# Patient Record
Sex: MALE | Race: WHITE | Employment: OTHER | RURAL
[De-identification: names, ages, dates, MRNs, and addresses within clinical notes are randomized per-mention and may not be internally consistent; named-entity substitution may affect disease eponyms.]

---

## 2017-04-04 ENCOUNTER — LAB ONLY (OUTPATIENT)
Dept: FAMILY MEDICINE CLINIC | Age: 67
End: 2017-04-04

## 2017-04-04 DIAGNOSIS — E05.90 SUBCLINICAL HYPERTHYROIDISM: ICD-10-CM

## 2017-04-04 DIAGNOSIS — Z12.5 SCREENING PSA (PROSTATE SPECIFIC ANTIGEN): ICD-10-CM

## 2017-04-04 DIAGNOSIS — I10 ESSENTIAL HYPERTENSION: ICD-10-CM

## 2017-04-04 DIAGNOSIS — Z11.59 NEED FOR HEPATITIS C SCREENING TEST: Primary | ICD-10-CM

## 2017-04-04 DIAGNOSIS — E78.2 MIXED HYPERLIPIDEMIA: ICD-10-CM

## 2017-04-04 DIAGNOSIS — E11.9 TYPE 2 DIABETES MELLITUS WITHOUT COMPLICATION, WITHOUT LONG-TERM CURRENT USE OF INSULIN (HCC): ICD-10-CM

## 2017-04-04 NOTE — MR AVS SNAPSHOT
Visit Information Date & Time Provider Department Dept. Phone Encounter #  
 4/4/2017  7:05 AM YO Gates [id number] Your Appointments 4/12/2017  7:30 AM  
Follow Up with Isabella Fleischer, MD  
[address] ([address]) Appt Note: 6 mo f/u  
 1000 [facility] [address] [facility],5[address] [id number] [phone]  
  
   
 [address] [facility] [address] Upcoming Health Maintenance Date Due Hepatitis C Screening 1950 MICROALBUMIN Q1 7/20/1958 EYE EXAM RETINAL OR DILATED Q1 7/20/1958 FOBT Q 1 YEAR AGE 50-75 7/20/1998 ZOSTER VACCINE AGE 60> 7/20/2008 GLAUCOMA SCREENING Q2Y 7/20/2013 MEDICARE YEARLY EXAM 7/20/2013 HEMOGLOBIN A1C Q6M 4/19/2017 LIPID PANEL Q1 10/19/2017 FOOT EXAM Q1 10/25/2017 DTaP/Tdap/Td series (2 - Td) 4/4/2026 Allergies as of 4/4/2017  Review Complete On: 10/25/2016 By: Sophia Tinoco LPN Severity Noted Reaction Type Reactions Ace Inhibitors  12/24/2015    Cough Amlodipine  12/24/2015    Not Reported This Time Claritin [Loratadine]  11/24/2015    Hives Pcn [Penicillins]  12/24/2015    Not Reported This Time Current Immunizations  Reviewed on 11/24/2015 Name Date Influenza High Dose Vaccine PF 10/19/2016  7:54 AM  
 Influenza Vaccine 10/27/2015 Pneumococcal Conjugate (PCV-13) 11/24/2015  4:00 PM  
 Pneumococcal Polysaccharide (PPSV-23) 9/11/2013 Tdap 4/4/2016 Not reviewed this visit You Were Diagnosed With   
  
 Codes Comments Need for hepatitis C screening test    -  Primary ICD-10-CM: Z11.59 
ICD-9-CM: V73.89 Type 2 diabetes mellitus without complication, without long-term current use of insulin (HCC)     ICD-10-CM: E11.9 ICD-9-CM: 250.00 Essential hypertension     ICD-10-CM: I10 
ICD-9-CM: 401.9 Mixed hyperlipidemia     ICD-10-CM: E78.2 ICD-9-CM: 272.2 Subclinical hyperthyroidism     ICD-10-CM: E05.90 ICD-9-CM: 242.90 Screening PSA (prostate specific antigen)     ICD-10-CM: Z12.5 ICD-9-CM: V76.44 Vitals Smoking Status Former Smoker Preferred Pharmacy Pharmacy Name Phone [address] [phone] Your Updated Medication List  
  
   
This list is accurate as of: 4/4/17  9:12 AM.  Always use your most recent med list.  
  
  
  
  
 atenolol 50 mg tablet Commonly known as:  TENORMIN  
TAKE 1/2 TABLET BY MOUTH EVERY DAY  
  
 atorvastatin 40 mg tablet Commonly known as:  LIPITOR  
TAKE 1 TABLET DAILY FISH OIL 1,000 mg Cap Generic drug:  omega-3 fatty acids-vitamin e Take 1 Cap by mouth.  
  
 losartan 100 mg tablet Commonly known as:  COZAAR Take 1 Tab by mouth daily. VITAMIN D3 2,000 unit Tab Generic drug:  cholecalciferol (vitamin D3) Take  by mouth. We Performed the Following CBC WITH AUTOMATED DIFF [76360 CPT(R)] HEMOGLOBIN A1C WITH EAG [33574 CPT(R)] HEPATITIS C AB [09521 CPT(R)] LIPID PANEL [37788 CPT(R)] METABOLIC PANEL, COMPREHENSIVE [73844 CPT(R)] RI COLLECTION VENOUS BLOOD,VENIPUNCTURE [id number] CPT(R)] RI HANDLG&/OR CONVEY OF SPEC FOR TR OFFICE TO LAB [36342 CPT(R)] PROSTATE SPECIFIC AG (PSA) [id number] CPT(R)] TSH 3RD GENERATION [30322 CPT(R)] Introducing [facility] & HEALTH SERVICES! Paelvia Hou introduces [employer] patient portal. Now you can access parts of your medical record, email your doctor's office, and request medication refills online. 1. In your internet browser, go to [email or web address] 2. Click on the First Time User? Click Here link in the Sign In box. You will see the New Member Sign Up page. 3. Enter your [employer] Access Code exactly as it appears below.  You will not need to use this code after youve completed the sign-up process. If you do not sign up before the expiration date, you must request a new code. · Hands Access Code: [email or web address] 
Expires: 7/3/2017  9:12 AM 
 
4. Enter the last four digits of your Social Security Number (xxxx) and Date of Birth (mm/dd/yyyy) as indicated and click Submit. You will be taken to the next sign-up page. 5. Create a Hands ID. This will be your Hands login ID and cannot be changed, so think of one that is secure and easy to remember. 6. Create a Hands password. You can change your password at any time. 7. Enter your Password Reset Question and Answer. This can be used at a later time if you forget your password. 8. Enter your e-mail address. You will receive e-mail notification when new information is available in [address] 9. Click Sign Up. You can now view and download portions of your medical record. 10. Click the Download Summary menu link to download a portable copy of your medical information. If you have questions, please visit the Frequently Asked Questions section of the Hands website. Remember, Hands is NOT to be used for urgent needs. For medical emergencies, dial 911. Now available from your iPhone and Android! Please provide this summary of care documentation to your next provider. Your primary care clinician is listed as Ivan Duffy. If you have any questions after today's visit, please call [phone].

## 2017-04-05 LAB
ALBUMIN SERPL-MCNC: 4.6 G/DL (ref 3.6–4.8)
ALBUMIN/GLOB SERPL: 2.2 {RATIO} (ref 1.2–2.2)
ALP SERPL-CCNC: 46 IU/L (ref 39–117)
ALT SERPL-CCNC: 25 IU/L (ref 0–44)
AST SERPL-CCNC: 30 IU/L (ref 0–40)
BASOPHILS # BLD AUTO: 0 X10E3/UL (ref 0–0.2)
BASOPHILS NFR BLD AUTO: 0 %
BILIRUB SERPL-MCNC: 1.1 MG/DL (ref 0–1.2)
BUN SERPL-MCNC: 14 MG/DL (ref 8–27)
BUN/CREAT SERPL: 15 (ref 10–24)
CALCIUM SERPL-MCNC: 9.3 MG/DL (ref 8.6–10.2)
CHLORIDE SERPL-SCNC: 102 MMOL/L (ref 96–106)
CHOLEST SERPL-MCNC: 122 MG/DL (ref 100–199)
CO2 SERPL-SCNC: 22 MMOL/L (ref 18–29)
CREAT SERPL-MCNC: 0.94 MG/DL (ref 0.76–1.27)
EOSINOPHIL # BLD AUTO: 0.3 X10E3/UL (ref 0–0.4)
EOSINOPHIL NFR BLD AUTO: 4 %
ERYTHROCYTE [DISTWIDTH] IN BLOOD BY AUTOMATED COUNT: 14.8 % (ref 12.3–15.4)
EST. AVERAGE GLUCOSE BLD GHB EST-MCNC: 128 MG/DL
GLOBULIN SER CALC-MCNC: 2.1 G/DL (ref 1.5–4.5)
GLUCOSE SERPL-MCNC: 125 MG/DL (ref 65–99)
HBA1C MFR BLD: 6.1 % (ref 4.8–5.6)
HCT VFR BLD AUTO: 43.8 % (ref 37.5–51)
HCV AB S/CO SERPL IA: <0.1 S/CO RATIO (ref 0–0.9)
HDLC SERPL-MCNC: 60 MG/DL
HGB BLD-MCNC: 14.4 G/DL (ref 12.6–17.7)
IMM GRANULOCYTES # BLD: 0 X10E3/UL (ref 0–0.1)
IMM GRANULOCYTES NFR BLD: 0 %
LDLC SERPL CALC-MCNC: 49 MG/DL (ref 0–99)
LYMPHOCYTES # BLD AUTO: 2.3 X10E3/UL (ref 0.7–3.1)
LYMPHOCYTES NFR BLD AUTO: 38 %
MCH RBC QN AUTO: 28.9 PG (ref 26.6–33)
MCHC RBC AUTO-ENTMCNC: 32.9 G/DL (ref 31.5–35.7)
MCV RBC AUTO: 88 FL (ref 79–97)
MONOCYTES # BLD AUTO: 0.5 X10E3/UL (ref 0.1–0.9)
MONOCYTES NFR BLD AUTO: 9 %
NEUTROPHILS # BLD AUTO: 2.9 X10E3/UL (ref 1.4–7)
NEUTROPHILS NFR BLD AUTO: 49 %
PLATELET # BLD AUTO: 209 X10E3/UL (ref 150–379)
POTASSIUM SERPL-SCNC: 4.4 MMOL/L (ref 3.5–5.2)
PROT SERPL-MCNC: 6.7 G/DL (ref 6–8.5)
PSA SERPL-MCNC: 0.8 NG/ML (ref 0–4)
RBC # BLD AUTO: 4.98 X10E6/UL (ref 4.14–5.8)
SODIUM SERPL-SCNC: 143 MMOL/L (ref 134–144)
TRIGL SERPL-MCNC: 67 MG/DL (ref 0–149)
TSH SERPL DL<=0.005 MIU/L-ACNC: 0.16 UIU/ML (ref 0.45–4.5)
VLDLC SERPL CALC-MCNC: 13 MG/DL (ref 5–40)
WBC # BLD AUTO: 6 X10E3/UL (ref 3.4–10.8)

## 2017-04-12 ENCOUNTER — OFFICE VISIT (OUTPATIENT)
Dept: FAMILY MEDICINE CLINIC | Age: 67
End: 2017-04-12

## 2017-04-12 VITALS
RESPIRATION RATE: 16 BRPM | WEIGHT: 186 LBS | SYSTOLIC BLOOD PRESSURE: 126 MMHG | HEART RATE: 50 BPM | OXYGEN SATURATION: 98 % | BODY MASS INDEX: 26.69 KG/M2 | DIASTOLIC BLOOD PRESSURE: 62 MMHG

## 2017-04-12 DIAGNOSIS — E05.90 SUBCLINICAL HYPERTHYROIDISM: ICD-10-CM

## 2017-04-12 DIAGNOSIS — E11.9 TYPE 2 DIABETES MELLITUS WITHOUT COMPLICATION, WITHOUT LONG-TERM CURRENT USE OF INSULIN (HCC): ICD-10-CM

## 2017-04-12 DIAGNOSIS — I10 ESSENTIAL HYPERTENSION: ICD-10-CM

## 2017-04-12 DIAGNOSIS — Z00.00 MEDICARE ANNUAL WELLNESS VISIT, SUBSEQUENT: Primary | ICD-10-CM

## 2017-04-12 NOTE — ACP (ADVANCE CARE PLANNING)
Discussed Advance Directives, thinks he had one at [facility], unsure. Will give him [place] form.

## 2017-04-12 NOTE — ACP (ADVANCE CARE PLANNING)
Discussed Advance Directives, thinks he had one at [facility], unsure. Will give him [place] form.

## 2017-04-12 NOTE — ACP (ADVANCE CARE PLANNING)
In the event that he is unable to speak for himself, he asks that we contact his wife, Laura Garnica, be contacted at [phone]    Marvin Vaz

## 2017-04-12 NOTE — MR AVS SNAPSHOT
Visit Information Date & Time Provider Department Dept. Phone Encounter #  
 4/12/2017  7:30 AM Lizette Linares MD [facility] [id number] Follow-up Instructions Return in about 1 year (around 4/12/2018). Upcoming Health Maintenance Date Due MICROALBUMIN Q1 7/20/1958 EYE EXAM RETINAL OR DILATED Q1 7/20/1958 FOBT Q 1 YEAR AGE 50-75 7/20/1998 GLAUCOMA SCREENING Q2Y 7/20/2013 MEDICARE YEARLY EXAM 7/20/2013 HEMOGLOBIN A1C Q6M 10/4/2017 FOOT EXAM Q1 10/25/2017 LIPID PANEL Q1 4/4/2018 DTaP/Tdap/Td series (2 - Td) 4/4/2026 Allergies as of 4/12/2017  Review Complete On: 4/12/2017 By: Lizette Linares MD  
  
 Severity Noted Reaction Type Reactions Ace Inhibitors  12/24/2015    Cough Amlodipine  12/24/2015    Not Reported This Time Claritin [Loratadine]  11/24/2015    Hives Pcn [Penicillins]  12/24/2015    Not Reported This Time Current Immunizations  Reviewed on 11/24/2015 Name Date Influenza High Dose Vaccine PF 10/19/2016  7:54 AM  
 Influenza Vaccine 10/27/2015 Pneumococcal Conjugate (PCV-13) 11/24/2015  4:00 PM  
 Pneumococcal Polysaccharide (PPSV-23) 9/11/2013 Tdap 4/4/2016 Not reviewed this visit You Were Diagnosed With   
  
 Codes Comments Medicare annual wellness visit, subsequent    -  Primary ICD-10-CM: Z00.00 ICD-9-CM: V70.0 Type 2 diabetes mellitus without complication, without long-term current use of insulin (HCC)     ICD-10-CM: E11.9 ICD-9-CM: 250.00 Subclinical hyperthyroidism     ICD-10-CM: E05.90 ICD-9-CM: 242.90 Essential hypertension     ICD-10-CM: I10 
ICD-9-CM: 401.9 Vitals BP Pulse Resp Weight(growth percentile) SpO2 BMI  
 126/62 (BP 1 Location: Left arm, BP Patient Position: Sitting) (!) 50 16 186 lb (84.4 kg) 98% 26.69 kg/m2 Smoking Status Former Smoker BMI and BSA Data Body Mass Index Body Surface Area  
 26.69 kg/m 2 2.04 m 2 Preferred Pharmacy Pharmacy Name Phone [address] [facility] [phone] Your Updated Medication List  
  
   
This list is accurate as of: 4/12/17  8:35 AM.  Always use your most recent med list.  
  
  
  
  
 atenolol 50 mg tablet Commonly known as:  TENORMIN  
TAKE 1/2 TABLET BY MOUTH EVERY DAY  
  
 atorvastatin 40 mg tablet Commonly known as:  LIPITOR  
TAKE 1 TABLET DAILY FISH OIL 1,000 mg Cap Generic drug:  omega-3 fatty acids-vitamin e Take 1 Cap by mouth two (2) times a day. losartan 100 mg tablet Commonly known as:  COZAAR Take 1 Tab by mouth daily. VITAMIN D3 2,000 unit Tab Generic drug:  cholecalciferol (vitamin D3) Take  by mouth. Follow-up Instructions Return in about 1 year (around 4/12/2018). Introducing \[facility]\"" & HEALTH SERVICES! Elidia Moore introduces [employer] patient portal. Now you can access parts of your medical record, email your doctor's office, and request medication refills online. 1. In your internet browser, go to [email or web address] 2. Click on the First Time User? Click Here link in the Sign In box. You will see the New Member Sign Up page. 3. Enter your [employer] Access Code exactly as it appears below. You will not need to use this code after youve completed the sign-up process. If you do not sign up before the expiration date, you must request a new code. · [employer] Access Code: [email or web address] 
Expires: 7/3/2017  9:12 AM 
 
4. Enter the last four digits of your Social Security Number (xxxx) and Date of Birth (mm/dd/yyyy) as indicated and click Submit. You will be taken to the next sign-up page. 5. Create a [employer] ID. This will be your [employer] login ID and cannot be changed, so think of one that is secure and easy to remember. 6. Create a Yob[employer] password. You can change your password at any time. 7. Enter your Password Reset Question and Answer. This can be used at a later time if you forget your password. 8. Enter your e-mail address. You will receive e-mail notification when new information is available in [address] 9. Click Sign Up. You can now view and download portions of your medical record. 10. Click the Download Summary menu link to download a portable copy of your medical information. If you have questions, please visit the Frequently Asked Questions section of the [employer] website. Remember, [employer] is NOT to be used for urgent needs. For medical emergencies, dial 911. Now available from your iPhone and Android! Please provide this summary of care documentation to your next provider. Your primary care clinician is listed as Victor Hugo Weathers. If you have any questions after today's visit, please call [phone].

## 2017-04-12 NOTE — PROGRESS NOTES
Chief Complaint   Patient presents with    Annual Wellness Visit         HPI:      Shade Soto is a 74 y.o. male retired [employer] employee who has a history of subclinical hyperthyroidism spanning several decades. He is taking Atenolol and no thyroid medications. No sx. Generally checks the TSH Q 6 months       He is hypertensive and is treated with Losartan and Atenolol. Denies side effects. Following an elevated A1c, he adopted a healthy diet and exercise program and has lost 15 pounds. His labs were reviewed with him today in the office (A1c 6.1). New Issues:  He is due for SAWV    Allergies   Allergen Reactions    Ace Inhibitors Cough    Amlodipine Not Reported This Time    Claritin [Loratadine] Hives    Pcn [Penicillins] Not Reported This Time       Current Outpatient Prescriptions   Medication Sig    atenolol (TENORMIN) 50 mg tablet TAKE 1/2 TABLET BY MOUTH EVERY DAY    losartan (COZAAR) 100 mg tablet Take 1 Tab by mouth daily.  cholecalciferol, vitamin D3, (VITAMIN D3) 2,000 unit tab Take  by mouth.  omega-3 fatty acids-vitamin e (FISH OIL) 1,000 mg cap Take 1 Cap by mouth.  atorvastatin (LIPITOR) 40 mg tablet TAKE 1 TABLET DAILY     No current facility-administered medications for this visit. Past Medical History:   Diagnosis Date    Conductive hearing loss, middle ear     [facility] 10/19/2016    A1c = 6.6    Elevated liver enzymes     HTN (hypertension)     Hyperlipidemia     Subclinical hyperthyroidism     Tinnitus          ROS:  Denies fever, chills, cough, chest pain, SOB,  nausea, vomiting, or diarrhea. Denies wt loss, wt gain, hemoptysis, hematochezia or melena.     Physical Examination:    /62 (BP 1 Location: Left arm, BP Patient Position: Sitting)  Pulse (!) 50  Resp 16  Wt 186 lb (84.4 kg)  SpO2 98%  BMI 26.69 kg/m2    General: Alert and Ox3, Fluent speech  HEENT:  NC/AT, EOMI, OP: clear  Neck:  Supple, no adenopathy, JVD, mass or bruit  Chest:  Clear to Ausculation, without wheezes, rales, rubs or ronchi  Cardiac: RRR  Abdomen:  +BS, soft, nontender without palpable HSM  Extremities:  No cyanosis, clubbing or edema  Neurologic:  Ambulatory without assist, CN 2-12 grossly intact. Moves all extremities. Skin: no rash  Lymphadenopathy: no cervical or supraclavicular nodes      ASSESSMENT AND PLAN:     1. Well controlled HTN:  He is at goal.  May need to back down the Losartan if he becomes hypotensive this summer  2. T2DM:  Basically he has put this into remission  3. ACP update  4. SAWV today  5. Subclinical Hyperthyroidism:  Continue to monitor every 6 months. No orders of the defined types were placed in this encounter. Tayler Pate MD, FACP        ______________________________________________________________________    Curt Clements is a 74 y.o. male and presents for annual Medicare Wellness Visit. Problem List: Reviewed with patient and discussed risk factors. Patient Active Problem List   Diagnosis Code    HTN (hypertension) I10    Hyperlipidemia E78.5    Elevated liver enzymes R74.8    Low TSH level R94.6    Subclinical hyperthyroidism E05.90    DM2 (diabetes mellitus, type 2) (HCC) E11.9       Current medical providers:  Patient Care Team:  Chani Stewart MD as PCP - General (Internal Medicine)    PM, , Medications/Allergies: reviewed, on chart. Female Alcohol Screening: On any occasion during the past 3 months, have you had more than 3 drinks containing alcohol? No    Do you average more than 7 drinks per week? No    ROS:  Constitutional: No fever, chills or weight loss  Respiratory: No cough, SOB   CV: No chest pain or Palpitations    Objective:  Visit Vitals    /62 (BP 1 Location: Left arm, BP Patient Position: Sitting)    Pulse (!) 50    Resp 16    Wt 186 lb (84.4 kg)    SpO2 98%    BMI 26.69 kg/m2    Body mass index is 26.69 kg/(m^2).     Assessment of cognitive impairment: Alert and oriented x 3    Depression Screen:   PHQ 2 / 9, over the last two weeks 4/12/2017   Little interest or pleasure in doing things Not at all   Feeling down, depressed or hopeless Not at all   Total Score PHQ 2 0       Fall Risk Assessment:    Fall Risk Assessment, last 12 mths 4/12/2017   Able to walk? Yes   Fall in past 12 months? No       Functional Ability:   Does the patient exhibit a steady gait? yes   How long did it take the patient to get up and walk from a sitting position? 12 sec   Is the patient self reliant?  (ie can do own laundry, meals, household chores)  yes     Does the patient handle his/her own medications? yes     Does the patient handle his/her own money? yes     Is the patients home safe (ie good lighting, handrails on stairs and bath, etc.)? yes     Did you notice or did patient express any hearing difficulties? yes     Did you notice or did patient express any vision difficulties? no       Advance Care Planning:   Patient was offered the opportunity to discuss advance care planning:  yes     Does patient have an Advance Directive:  yes   If no, did you provide information on Caring Connections?  no       Plan:      No orders of the defined types were placed in this encounter. Health Maintenance   Topic Date Due    MICROALBUMIN Q1  07/20/1960    EYE EXAM RETINAL OR DILATED Q1  07/20/1958    FOBT Q 1 YEAR AGE 50-75  07/20/1998    GLAUCOMA SCREENING Q2Y  07/20/2013    MEDICARE YEARLY EXAM  07/20/2013    HEMOGLOBIN A1C Q6M  10/04/2017    FOOT EXAM Q1  10/25/2017    LIPID PANEL Q1  04/04/2018    DTaP/Tdap/Td series (2 - Td) 04/04/2026    Hepatitis C Screening  Completed    ZOSTER VACCINE AGE 60>  Addressed    Pneumococcal 65+ Low/Medium Risk  Completed    INFLUENZA AGE 9 TO ADULT  Completed       *Patient verbalized understanding and agreement with the plan.   A copy of the After Visit Summary with personalized health plan was given to the patient today.

## 2017-06-16 ENCOUNTER — TELEPHONE (OUTPATIENT)
Dept: FAMILY MEDICINE CLINIC | Age: 67
End: 2017-06-16

## 2017-06-16 RX ORDER — LOSARTAN POTASSIUM 100 MG/1
100 TABLET ORAL DAILY
Qty: 90 TAB | Refills: 3 | Status: SHIPPED | OUTPATIENT
Start: 2017-06-16 | End: 2018-06-28 | Stop reason: DRUGHIGH

## 2017-06-16 RX ORDER — ATORVASTATIN CALCIUM 40 MG/1
40 TABLET, FILM COATED ORAL DAILY
Qty: 90 TAB | Refills: 3 | Status: SHIPPED | OUTPATIENT
Start: 2017-06-16 | End: 2018-06-28 | Stop reason: SDUPTHER

## 2017-10-24 ENCOUNTER — LAB ONLY (OUTPATIENT)
Dept: FAMILY MEDICINE CLINIC | Age: 67
End: 2017-10-24

## 2017-10-24 DIAGNOSIS — E05.90 SUBCLINICAL HYPERTHYROIDISM: ICD-10-CM

## 2017-10-24 DIAGNOSIS — Z23 ENCOUNTER FOR IMMUNIZATION: ICD-10-CM

## 2017-10-24 DIAGNOSIS — I10 ESSENTIAL HYPERTENSION: ICD-10-CM

## 2017-10-24 DIAGNOSIS — E11.9 TYPE 2 DIABETES MELLITUS WITHOUT COMPLICATION, WITHOUT LONG-TERM CURRENT USE OF INSULIN (HCC): ICD-10-CM

## 2017-10-24 DIAGNOSIS — E11.9 TYPE 2 DIABETES MELLITUS WITHOUT COMPLICATION, WITHOUT LONG-TERM CURRENT USE OF INSULIN (HCC): Primary | ICD-10-CM

## 2017-10-24 NOTE — PROGRESS NOTES
Usama Hunter is a 69 y.o. male who presents for routine immunizations. He denies any symptoms , reactions or allergies that would exclude them from being immunized today. Risks and adverse reactions were discussed and the VIS was given to them. All questions were addressed.       Shane Hramon LPN

## 2017-10-24 NOTE — MR AVS SNAPSHOT
Visit Information Date & Time Provider Department Dept. Phone Encounter #  
 10/24/2017  7:05 AM YO Edmondson0 [id number] Upcoming Health Maintenance Date Due MICROALBUMIN Q1 7/20/1960 EYE EXAM RETINAL OR DILATED Q1 7/20/1960 FOBT Q 1 YEAR AGE 50-75 7/20/1998 GLAUCOMA SCREENING Q2Y 7/20/2013 INFLUENZA AGE 9 TO ADULT 8/1/2017 HEMOGLOBIN A1C Q6M 10/4/2017 FOOT EXAM Q1 10/25/2017 LIPID PANEL Q1 4/4/2018 MEDICARE YEARLY EXAM 4/13/2018 DTaP/Tdap/Td series (2 - Td) 4/4/2026 Allergies as of 10/24/2017  Review Complete On: 4/12/2017 By: Ashlee Bran MD  
  
 Severity Noted Reaction Type Reactions Ace Inhibitors  12/24/2015    Cough Amlodipine  12/24/2015    Not Reported This Time Claritin [Loratadine]  11/24/2015    Hives Pcn [Penicillins]  12/24/2015    Not Reported This Time Current Immunizations  Reviewed on 11/24/2015 Name Date Influenza High Dose Vaccine PF 10/24/2017  7:26 AM, 10/19/2016  7:54 AM  
 Influenza Vaccine 10/27/2015 Pneumococcal Conjugate (PCV-13) 11/24/2015  4:00 PM  
 Pneumococcal Polysaccharide (PPSV-23) 9/11/2013 Tdap 4/4/2016 Not reviewed this visit You Were Diagnosed With   
  
 Codes Comments Encounter for immunization     ICD-10-CM: B43 ICD-9-CM: V03.89 Vitals Smoking Status Former Smoker Preferred Pharmacy Pharmacy Name Phone [id number] [address] [phone] Your Updated Medication List  
  
   
This list is accurate as of: 10/24/17  7:30 AM.  Always use your most recent med list.  
  
  
  
  
 atenolol 50 mg tablet Commonly known as:  TENORMIN  
TAKE 1/2 TABLET BY MOUTH EVERY DAY  
  
 atorvastatin 40 mg tablet Commonly known as:  LIPITOR Take 1 Tab by mouth daily. FISH OIL 1,000 mg Cap Generic drug:  omega-3 fatty acids-vitamin e Take 1 Cap by mouth two (2) times a day. losartan 100 mg tablet Commonly known as:  COZAAR Take 1 Tab by mouth daily. VITAMIN D3 2,000 unit Tab Generic drug:  cholecalciferol (vitamin D3) Take  by mouth. We Performed the Following INFLUENZA VIRUS VACCINE, HIGH DOSE SEASONAL, PRESERVATIVE FREE [06049 CPT(R)] Patient Instructions Vaccine Information Statement Influenza (Flu) Vaccine (Inactivated or Recombinant): What you need to know Many Vaccine Information Statements are available in Turkish and other languages. See www.immunize.org/vis Hojas de Información Sobre Vacunas están disponibles en Español y en muchos otros idiomas. Visite www.immunize.org/vis 1. Why get vaccinated? Influenza (flu) is a contagious disease that spreads around the [facility] every year, usually between October and May. Flu is caused by influenza viruses, and is spread mainly by coughing, sneezing, and close contact. Anyone can get flu. Flu strikes suddenly and can last several days. Symptoms vary by age, but can include: 
 fever/chills  sore throat  muscle aches  fatigue  cough  headache  runny or stuffy nose Flu can also lead to pneumonia and blood infections, and cause diarrhea and seizures in children. If you have a medical condition, such as heart or lung disease, flu can make it worse. Flu is more dangerous for some people. Infants and young children, people 72years of age and older, pregnant women, and people with certain health conditions or a weakened immune system are at greatest risk. Each year thousands of people in the [facility] die from flu, and many more are hospitalized. Flu vaccine can: 
 keep you from getting flu, 
 make flu less severe if you do get it, and 
 keep you from spreading flu to your family and other people. 2. Inactivated and recombinant flu vaccines A dose of flu vaccine is recommended every flu season. Children 6 months through 6years of age may need two doses during the same flu season. Everyone else needs only one dose each flu season. Some inactivated flu vaccines contain a very small amount of a mercury-based preservative called thimerosal. Studies have not shown thimerosal in vaccines to be harmful, but flu vaccines that do not contain thimerosal are available. There is no live flu virus in flu shots. They cannot cause the flu. There are many flu viruses, and they are always changing. Each year a new flu vaccine is made to protect against three or four viruses that are likely to cause disease in the upcoming flu season. But even when the vaccine doesnt exactly match these viruses, it may still provide some protection Flu vaccine cannot prevent: 
 flu that is caused by a virus not covered by the vaccine, or 
 illnesses that look like flu but are not. It takes about 2 weeks for protection to develop after vaccination, and protection lasts through the flu season. 3. Some people should not get this vaccine Tell the person who is giving you the vaccine:  If you have any severe, life-threatening allergies. If you ever had a life-threatening allergic reaction after a dose of flu vaccine, or have a severe allergy to any part of this vaccine, you may be advised not to get vaccinated. Most, but not all, types of flu vaccine contain a small amount of egg protein.  If you ever had Guillain-Barré Syndrome (also called GBS). Some people with a history of GBS should not get this vaccine. This should be discussed with your doctor.  If you are not feeling well. It is usually okay to get flu vaccine when you have a mild illness, but you might be asked to come back when you feel better. 4. Risks of a vaccine reaction With any medicine, including vaccines, there is a chance of reactions. These are usually mild and go away on their own, but serious reactions are also possible. Most people who get a flu shot do not have any problems with it. Minor problems following a flu shot include:  
 soreness, redness, or swelling where the shot was given  hoarseness  sore, red or itchy eyes  cough  fever  aches  headache  itching  fatigue If these problems occur, they usually begin soon after the shot and last 1 or 2 days. More serious problems following a flu shot can include the following:  There may be a small increased risk of Guillain-Barré Syndrome (GBS) after inactivated flu vaccine. This risk has been estimated at 1 or 2 additional cases per million people vaccinated. This is much lower than the risk of severe complications from flu, which can be prevented by flu vaccine.  Young children who get the flu shot along with pneumococcal vaccine (PCV13) and/or DTaP vaccine at the same time might be slightly more likely to have a seizure caused by fever. Ask your doctor for more information. Tell your doctor if a child who is getting flu vaccine has ever had a seizure. Problems that could happen after any injected vaccine:  People sometimes faint after a medical procedure, including vaccination. Sitting or lying down for about 15 minutes can help prevent fainting, and injuries caused by a fall. Tell your doctor if you feel dizzy, or have vision changes or ringing in the ears.  Some people get severe pain in the shoulder and have difficulty moving the arm where a shot was given. This happens very rarely.  Any medication can cause a severe allergic reaction. Such reactions from a vaccine are very rare, estimated at about 1 in a million doses, and would happen within a few minutes to a few hours after the vaccination. As with any medicine, there is a very remote chance of a vaccine causing a serious injury or death. The safety of vaccines is always being monitored. For more information, visit: www.cdc.gov/vaccinesafety/ 
 
 
The [facility] Vaccine Injury Compensation Program (VICP) is a federal program that was created to compensate people who may have been injured by certain vaccines. Persons who believe they may have been injured by a vaccine can learn about the program and about filing a claim by calling 8-671.570.3537 or visiting the [facility] website at www.[facility].gov/vaccinecompensation. There is a time limit to file a claim for compensation. 7. How can I learn more?  Ask your healthcare provider. He or she can give you the vaccine package insert or suggest other sources of information.  Call your local or state health department.  Contact the Centers for Disease Control and Prevention (CDC): 
- Call 1-522.457.7828 (1-800-CDC-INFO) or 
- Visit CDCs website at www.cdc.gov/flu Vaccine Information Statement Inactivated Influenza Vaccine 8/7/2015 
42 JOEL Caballero [id number] Department of Health and [employer] Centers for Disease Control and Prevention Office Use Only Introducing [facility]! Magdalena Katichar introduces Trly Uniq patient portal. Now you can access parts of your medical record, email your doctor's office, and request medication refills online. 1. In your internet browser, go to https://Rx Systems PF. GroupMe/Rx Systems PF 2. Click on the First Time User? Click Here link in the Sign In box. You will see the New Member Sign Up page. 3. Enter your Trly Uniq Access Code exactly as it appears below. You will not need to use this code after youve completed the sign-up process. If you do not sign up before the expiration date, you must request a new code. · Trly Uniq Access Code: [email or web address] Expires: 1/22/2018  7:13 AM 
 
4. Enter the last four digits of your Social Security Number (xxxx) and Date of Birth (mm/dd/yyyy) as indicated and click Submit. You will be taken to the next sign-up page. 5. Create a Trly Uniq ID. This will be your Trly Uniq login ID and cannot be changed, so think of one that is secure and easy to remember. 6. Create a Trly Uniq password. You can change your password at any time. 7. Enter your Password Reset Question and Answer. This can be used at a later time if you forget your password. 8. Enter your e-mail address. You will receive e-mail notification when new information is available in [address] 9. Click Sign Up. You can now view and download portions of your medical record. 10. Click the Download Summary menu link to download a portable copy of your medical information. If you have questions, please visit the Frequently Asked Questions section of the Trly Uniq website. Remember, Trly Uniq is NOT to be used for urgent needs. For medical emergencies, dial 911. Now available from your iPhone and Android! Please provide this summary of care documentation to your next provider. Your primary care clinician is listed as Cali Payton. If you have any questions after today's visit, please call [phone].

## 2017-10-25 LAB
ALBUMIN SERPL-MCNC: 4.6 G/DL (ref 3.6–4.8)
ALBUMIN/GLOB SERPL: 2.1 {RATIO} (ref 1.2–2.2)
ALP SERPL-CCNC: 50 IU/L (ref 39–117)
ALT SERPL-CCNC: 26 IU/L (ref 0–44)
AST SERPL-CCNC: 31 IU/L (ref 0–40)
BASOPHILS # BLD AUTO: 0 X10E3/UL (ref 0–0.2)
BASOPHILS NFR BLD AUTO: 0 %
BILIRUB SERPL-MCNC: 0.8 MG/DL (ref 0–1.2)
BUN SERPL-MCNC: 20 MG/DL (ref 8–27)
BUN/CREAT SERPL: 22 (ref 10–24)
CALCIUM SERPL-MCNC: 9.1 MG/DL (ref 8.6–10.2)
CHLORIDE SERPL-SCNC: 101 MMOL/L (ref 96–106)
CHOLEST SERPL-MCNC: 126 MG/DL (ref 100–199)
CO2 SERPL-SCNC: 27 MMOL/L (ref 18–29)
CREAT SERPL-MCNC: 0.9 MG/DL (ref 0.76–1.27)
EOSINOPHIL # BLD AUTO: 0.2 X10E3/UL (ref 0–0.4)
EOSINOPHIL NFR BLD AUTO: 3 %
ERYTHROCYTE [DISTWIDTH] IN BLOOD BY AUTOMATED COUNT: 13.8 % (ref 12.3–15.4)
EST. AVERAGE GLUCOSE BLD GHB EST-MCNC: 128 MG/DL
GFR SERPLBLD CREATININE-BSD FMLA CKD-EPI: 100 ML/MIN/1.73
GFR SERPLBLD CREATININE-BSD FMLA CKD-EPI: 87 ML/MIN/1.73
GLOBULIN SER CALC-MCNC: 2.2 G/DL (ref 1.5–4.5)
GLUCOSE SERPL-MCNC: 120 MG/DL (ref 65–99)
HBA1C MFR BLD: 6.1 % (ref 4.8–5.6)
HCT VFR BLD AUTO: 42.4 % (ref 37.5–51)
HDLC SERPL-MCNC: 56 MG/DL
HGB BLD-MCNC: 14.3 G/DL (ref 12.6–17.7)
IMM GRANULOCYTES # BLD: 0 X10E3/UL (ref 0–0.1)
IMM GRANULOCYTES NFR BLD: 0 %
LDLC SERPL CALC-MCNC: 58 MG/DL (ref 0–99)
LYMPHOCYTES # BLD AUTO: 2.3 X10E3/UL (ref 0.7–3.1)
LYMPHOCYTES NFR BLD AUTO: 40 %
MCH RBC QN AUTO: 28.5 PG (ref 26.6–33)
MCHC RBC AUTO-ENTMCNC: 33.7 G/DL (ref 31.5–35.7)
MCV RBC AUTO: 85 FL (ref 79–97)
MONOCYTES # BLD AUTO: 0.5 X10E3/UL (ref 0.1–0.9)
MONOCYTES NFR BLD AUTO: 8 %
NEUTROPHILS # BLD AUTO: 2.8 X10E3/UL (ref 1.4–7)
NEUTROPHILS NFR BLD AUTO: 49 %
PLATELET # BLD AUTO: 183 X10E3/UL (ref 150–379)
POTASSIUM SERPL-SCNC: 4.5 MMOL/L (ref 3.5–5.2)
PROT SERPL-MCNC: 6.8 G/DL (ref 6–8.5)
RBC # BLD AUTO: 5.01 X10E6/UL (ref 4.14–5.8)
SODIUM SERPL-SCNC: 143 MMOL/L (ref 134–144)
TRIGL SERPL-MCNC: 60 MG/DL (ref 0–149)
TSH SERPL DL<=0.005 MIU/L-ACNC: 0.3 UIU/ML (ref 0.45–4.5)
VLDLC SERPL CALC-MCNC: 12 MG/DL (ref 5–40)
WBC # BLD AUTO: 5.9 X10E3/UL (ref 3.4–10.8)

## 2018-06-21 ENCOUNTER — TELEPHONE (OUTPATIENT)
Dept: FAMILY MEDICINE CLINIC | Age: 68
End: 2018-06-21

## 2018-06-22 DIAGNOSIS — E11.9 TYPE 2 DIABETES MELLITUS WITHOUT COMPLICATION, WITHOUT LONG-TERM CURRENT USE OF INSULIN (HCC): Primary | ICD-10-CM

## 2018-06-22 DIAGNOSIS — E05.90 SUBCLINICAL HYPERTHYROIDISM: ICD-10-CM

## 2018-06-22 DIAGNOSIS — I10 ESSENTIAL HYPERTENSION: ICD-10-CM

## 2018-06-25 ENCOUNTER — LAB ONLY (OUTPATIENT)
Dept: FAMILY MEDICINE CLINIC | Age: 68
End: 2018-06-25

## 2018-06-25 DIAGNOSIS — E05.90 SUBCLINICAL HYPERTHYROIDISM: ICD-10-CM

## 2018-06-25 DIAGNOSIS — I10 ESSENTIAL HYPERTENSION: ICD-10-CM

## 2018-06-25 DIAGNOSIS — E11.9 TYPE 2 DIABETES MELLITUS WITHOUT COMPLICATION, WITHOUT LONG-TERM CURRENT USE OF INSULIN (HCC): ICD-10-CM

## 2018-06-25 LAB
ALBUMIN UR QL STRIP: 10 MG/L
CREATININE, URINE POC: 200 MG/DL
MICROALBUMIN/CREAT RATIO POC: <30 MG/G

## 2018-06-25 NOTE — MR AVS SNAPSHOT
[facility] 
 
 
 [address] [address] [phone] Patient: Claudeen Mccoy MRN: [id number]:[birth year]78[id number] Visit Information Date & Time Provider Department Dept. Phone Encounter #  
 6/25/2018  7:00 AM Kasey Riojas [id number] Your Appointments 6/28/2018  7:30 AM  
ESTABLISHED PATIENT with Álvaro Fernandez MD  
[place] [address] ([facility]) Appt Note: annual physical  
 [address] [address] [phone]  
  
   
 [address] [address] Upcoming Health Maintenance Date Due MICROALBUMIN Q1 7[birth year]20/1960 EYE EXAM RETINAL OR DILATED Q1 7/20/1958 FOBT Q 1 YEAR AGE 50-75 7/20/1998 GLAUCOMA SCREENING Q2Y 7/20/2013 FOOT EXAM Q1 10/25/2017 MEDICARE YEARLY EXAM 4/13/2018 HEMOGLOBIN A1C Q6M 4/24/2018 Influenza Age 5 to Adult 8/1/2018 LIPID PANEL Q1 10/24/2018 DTaP/Tdap/Td series (2 - Td) 4/4/2026 Allergies as of 6/25/2018  Review Complete On: 4/12/2017 By: Álvaro Fernandez MD  
  
 Severity Noted Reaction Type Reactions Ace Inhibitors  12/24/2015    Cough Amlodipine  12/24/2015    Not Reported This Time Claritin [Loratadine]  11/24/2015    Hives Pcn [Penicillins]  12/24/2015    Not Reported This Time Current Immunizations  Reviewed on 11/24/2015 Name Date Influenza High Dose Vaccine PF 10/24/2017  7:26 AM, 10/19/2016  7:54 AM  
 Influenza Vaccine 10/27/2015 Pneumococcal Conjugate (PCV-13) 11/24/2015  4:00 PM  
 Pneumococcal Polysaccharide (PPSV-23) 9/11/2013 Tdap 4/4/2016 Not reviewed this visit You Were Diagnosed With   
  
 Codes Comments Type 2 diabetes mellitus without complication, without long-term current use of insulin (HCC)     ICD-10-CM: E11.9 ICD-9-CM: 250.00 Essential hypertension     ICD-10-CM: I10 
ICD-9-CM: 401.9 Subclinical hyperthyroidism     ICD-10-CM: E05.90 ICD-9-CM: 242.90 Vitals Smoking Status Former Smoker Preferred Pharmacy [facility] Name Phone [id number] [address] [phone] Your Updated Medication List  
  
   
This list is accurate as of 6/25/18  7:32 AM.  Always use your most recent med list.  
  
  
  
  
 atenolol 50 mg tablet Commonly known as:  TENORMIN  
TAKE 1/2 TABLET BY MOUTH EVERY DAY  
  
 atorvastatin 40 mg tablet Commonly known as:  LIPITOR Take 1 Tab by mouth daily. FISH OIL 1,000 mg Cap Generic drug:  omega-3 fatty acids-vitamin e Take 1 Cap by mouth two (2) times a day. losartan 100 mg tablet Commonly known as:  COZAAR Take 1 Tab by mouth daily. VITAMIN D3 2,000 unit Tab Generic drug:  cholecalciferol (vitamin D3) Take  by mouth. We Performed the Following AMB POC URINE, MICROALBUMIN, SEMIQUANT (3 RESULTS) [67653 CPT(R)] CBC WITH AUTOMATED DIFF [19374 CPT(R)] HEMOGLOBIN A1C WITH EAG [06409 CPT(R)] LIPID PANEL [06729 CPT(R)] METABOLIC PANEL, COMPREHENSIVE [06169 CPT(R)] T4 (THYROXINE) [id number] CPT(R)] T4, FREE [id number] CPT(R)] TSH 3RD GENERATION [13262 CPT(R)] Patient Instructions If you have any questions regarding [employer], you may call [employer] support at [phone]. If you have any questions regarding [employer], you may call [employer] support at [phone]. Patient Instructions History Introducing [facility]! [employer] introduces ePatientFinder patient portal. Now you can access parts of your medical record, email your doctor's office, and request medication refills online. 1. In your internet browser, go to https://[employer]. [employer] 2. Click on the First Time User? Click Here link in the Sign In box. You will see the New Member Sign Up page. 3. Enter your Nantero Access Code exactly as it appears below. You will not need to use this code after youve completed the sign-up process. If you do not sign up before the expiration date, you must request a new code. · [employer]tero Access Code: [email or web address] Expires: 9/23/2018  7:28 AM 
 
4. Enter the last four digits of your Social Security Number (xxxx) and Date of Birth (mm/dd/yyyy) as indicated and click Submit. You will be taken to the next sign-up page. 5. Create a Nantero ID. This will be your Nantero login ID and cannot be changed, so think of one that is secure and easy to remember. 6. Create a Nantero password. You can change your password at any time. 7. Enter your Password Reset Question and Answer. This can be used at a later time if you forget your password. 8. Enter your e-mail address. You will receive e-mail notification when new information is available in [address] 9. Click Sign Up. You can now view and download portions of your medical record. 10. Click the Download Summary menu link to download a portable copy of your medical information. If you have questions, please visit the Frequently Asked Questions section of the Nantero website. Remember, Nantero is NOT to be used for urgent needs. For medical emergencies, dial 911. Now available from your iPhone and Android! Please provide this summary of care documentation to your next provider. Your primary care clinician is listed as John Osorio. If you have any questions after today's visit, please call [phone].

## 2018-06-25 NOTE — PATIENT INSTRUCTIONS
If you have any questions regarding [employer], you may call [employer] support at [phone]. If you have any questions regarding [employer], you may call [employer] support at [phone].

## 2018-06-26 LAB
ALBUMIN SERPL-MCNC: 4.4 G/DL (ref 3.6–4.8)
ALBUMIN/GLOB SERPL: 1.9 {RATIO} (ref 1.2–2.2)
ALP SERPL-CCNC: 46 IU/L (ref 39–117)
ALT SERPL-CCNC: 29 IU/L (ref 0–44)
AST SERPL-CCNC: 30 IU/L (ref 0–40)
BASOPHILS # BLD AUTO: 0 X10E3/UL (ref 0–0.2)
BASOPHILS NFR BLD AUTO: 0 %
BILIRUB SERPL-MCNC: 1.1 MG/DL (ref 0–1.2)
BUN SERPL-MCNC: 20 MG/DL (ref 8–27)
BUN/CREAT SERPL: 20 (ref 10–24)
CALCIUM SERPL-MCNC: 9.1 MG/DL (ref 8.6–10.2)
CHLORIDE SERPL-SCNC: 105 MMOL/L (ref 96–106)
CHOLEST SERPL-MCNC: 113 MG/DL (ref 100–199)
CO2 SERPL-SCNC: 23 MMOL/L (ref 20–29)
CREAT SERPL-MCNC: 1.01 MG/DL (ref 0.76–1.27)
EOSINOPHIL # BLD AUTO: 0.2 X10E3/UL (ref 0–0.4)
EOSINOPHIL NFR BLD AUTO: 4 %
ERYTHROCYTE [DISTWIDTH] IN BLOOD BY AUTOMATED COUNT: 14.1 % (ref 12.3–15.4)
EST. AVERAGE GLUCOSE BLD GHB EST-MCNC: 123 MG/DL
GFR SERPLBLD CREATININE-BSD FMLA CKD-EPI: 76 ML/MIN/1.73
GFR SERPLBLD CREATININE-BSD FMLA CKD-EPI: 87 ML/MIN/1.73
GLOBULIN SER CALC-MCNC: 2.3 G/DL (ref 1.5–4.5)
GLUCOSE SERPL-MCNC: 111 MG/DL (ref 65–99)
HBA1C MFR BLD: 5.9 % (ref 4.8–5.6)
HCT VFR BLD AUTO: 42 % (ref 37.5–51)
HDLC SERPL-MCNC: 63 MG/DL
HGB BLD-MCNC: 14 G/DL (ref 13–17.7)
IMM GRANULOCYTES # BLD: 0 X10E3/UL (ref 0–0.1)
IMM GRANULOCYTES NFR BLD: 0 %
LDLC SERPL CALC-MCNC: 39 MG/DL (ref 0–99)
LYMPHOCYTES # BLD AUTO: 2.1 X10E3/UL (ref 0.7–3.1)
LYMPHOCYTES NFR BLD AUTO: 39 %
MCH RBC QN AUTO: 29.2 PG (ref 26.6–33)
MCHC RBC AUTO-ENTMCNC: 33.3 G/DL (ref 31.5–35.7)
MCV RBC AUTO: 88 FL (ref 79–97)
MONOCYTES # BLD AUTO: 0.4 X10E3/UL (ref 0.1–0.9)
MONOCYTES NFR BLD AUTO: 8 %
NEUTROPHILS # BLD AUTO: 2.6 X10E3/UL (ref 1.4–7)
NEUTROPHILS NFR BLD AUTO: 49 %
PLATELET # BLD AUTO: 187 X10E3/UL (ref 150–379)
POTASSIUM SERPL-SCNC: 4.6 MMOL/L (ref 3.5–5.2)
PROT SERPL-MCNC: 6.7 G/DL (ref 6–8.5)
RBC # BLD AUTO: 4.79 X10E6/UL (ref 4.14–5.8)
SODIUM SERPL-SCNC: 142 MMOL/L (ref 134–144)
T4 FREE SERPL-MCNC: 1.46 NG/DL (ref 0.82–1.77)
T4 SERPL-MCNC: 9.3 UG/DL (ref 4.5–12)
TRIGL SERPL-MCNC: 55 MG/DL (ref 0–149)
TSH SERPL DL<=0.005 MIU/L-ACNC: 0.17 UIU/ML (ref 0.45–4.5)
VLDLC SERPL CALC-MCNC: 11 MG/DL (ref 5–40)
WBC # BLD AUTO: 5.3 X10E3/UL (ref 3.4–10.8)

## 2018-06-28 ENCOUNTER — OFFICE VISIT (OUTPATIENT)
Dept: FAMILY MEDICINE CLINIC | Age: 68
End: 2018-06-28

## 2018-06-28 VITALS
SYSTOLIC BLOOD PRESSURE: 134 MMHG | WEIGHT: 184.4 LBS | BODY MASS INDEX: 27.31 KG/M2 | OXYGEN SATURATION: 98 % | DIASTOLIC BLOOD PRESSURE: 58 MMHG | RESPIRATION RATE: 16 BRPM | HEIGHT: 69 IN | HEART RATE: 64 BPM

## 2018-06-28 DIAGNOSIS — R19.5 POSITIVE FIT (FECAL IMMUNOCHEMICAL TEST): ICD-10-CM

## 2018-06-28 DIAGNOSIS — I10 ESSENTIAL HYPERTENSION: ICD-10-CM

## 2018-06-28 DIAGNOSIS — E11.9 TYPE 2 DIABETES MELLITUS WITHOUT COMPLICATION, WITHOUT LONG-TERM CURRENT USE OF INSULIN (HCC): ICD-10-CM

## 2018-06-28 DIAGNOSIS — Z00.00 MEDICARE ANNUAL WELLNESS VISIT, SUBSEQUENT: Primary | ICD-10-CM

## 2018-06-28 DIAGNOSIS — Z12.11 SCREENING FOR COLON CANCER: ICD-10-CM

## 2018-06-28 RX ORDER — ASPIRIN 81 MG/1
TABLET ORAL DAILY
COMMUNITY
End: 2020-01-14 | Stop reason: ALTCHOICE

## 2018-06-28 RX ORDER — LOSARTAN POTASSIUM 50 MG/1
50 TABLET ORAL DAILY
Qty: 90 TAB | Refills: 4 | Status: SHIPPED | OUTPATIENT
Start: 2018-06-28 | End: 2018-09-19 | Stop reason: CLARIF

## 2018-06-28 NOTE — ACP (ADVANCE CARE PLANNING)
Pt has advance directive at home. Recommended to bring by the clinic for inclusion in chart at patient's convenience.  Discussed 6/28/2018

## 2018-06-28 NOTE — MR AVS SNAPSHOT
[address] [address] [phone] Patient: Micheline Weller MRN: [id number] OKO:9[birth year]80/[id number] Visit Information Date & Time Provider Department Dept. Phone Encounter #  
 6/28/2018  7:30 AM Lora Cherry MD [address] [place] [address] [id number] Follow-up Instructions Return in about 1 year (around 6/28/2019). Follow-up and Disposition History Upcoming Health Maintenance Date Due  
 EYE EXAM RETINAL OR DILATED Q1 7[birth year]20/1960 FOBT Q 1 YEAR AGE 50-75 7/20/1998 GLAUCOMA SCREENING Q2Y 7/20/2013 FOOT EXAM Q1 10/25/2017 MEDICARE YEARLY EXAM 4/13/2018 Influenza Age 5 to Adult 8/1/2018 HEMOGLOBIN A1C Q6M 12/25/2018 MICROALBUMIN Q1 6/25/2019 LIPID PANEL Q1 6/25/2019 DTaP/Tdap/Td series (2 - Td) 4/4/2026 Allergies as of 6/28/2018  Review Complete On: 6/28/2018 By: Lora Cherry MD  
  
 Severity Noted Reaction Type Reactions Ace Inhibitors  12/24/2015    Cough Amlodipine  12/24/2015    Not Reported This Time Claritin [Loratadine]  11/24/2015    Hives Pcn [Penicillins]  12/24/2015    Not Reported This Time Current Immunizations  Reviewed on 11/24/2015 Name Date Influenza High Dose Vaccine PF 10/24/2017  7:26 AM, 10/19/2016  7:54 AM  
 Influenza Vaccine 10/27/2015 Pneumococcal Conjugate (PCV-13) 11/24/2015  4:00 PM  
 Pneumococcal Polysaccharide (PPSV-23) 9/11/2013 Tdap 4/4/2016 Not reviewed this visit You Were Diagnosed With   
  
 Codes Comments Medicare annual wellness visit, subsequent    -  Primary ICD-10-CM: Z00.00 ICD-9-CM: V70.0 Type 2 diabetes mellitus without complication, without long-term current use of insulin (HCC)     ICD-10-CM: E11.9 ICD-9-CM: 250.00 Essential hypertension     ICD-10-CM: I10 
ICD-9-CM: 401.9 Vitals BP Pulse Resp Height(growth percentile) Weight(growth percentile) SpO2 134/58 (BP 1 Location: Left arm, BP Patient Position: Sitting) 64 16 5' 9.25\" (1.759 m) 184 lb 6.4 oz (83.6 kg) 98% BMI Smoking Status 27.03 kg/m2 Former Smoker BMI and BSA Data Body Mass Index Body Surface Area  
 27.03 kg/m 2 2.02 m 2 Preferred Pharmacy Pharmacy Name Phone [facility] AT [facility]  [address] [phone] Your Updated Medication List  
  
   
This list is accurate as of 6/28/18  7:55 AM.  Always use your most recent med list.  
  
  
  
  
 aspirin delayed-release 81 mg tablet Take  by mouth daily. atorvastatin 40 mg tablet Commonly known as:  LIPITOR  
TAKE 1 TABLET BY MOUTH EVERY DAY  
  
 FISH OIL 1,000 mg Cap Generic drug:  omega-3 fatty acids-vitamin e Take 1 Cap by mouth two (2) times a day. losartan 50 mg tablet Commonly known as:  COZAAR Take 1 Tab by mouth daily. VITAMIN D3 2,000 unit Tab Generic drug:  cholecalciferol (vitamin D3) Take  by mouth. Prescriptions Sent to Pharmacy Refills  
 losartan (COZAAR) 50 mg tablet 4 Sig: Take 1 Tab by mouth daily. Class: Normal  
 Pharmacy: [facility], [address] [facility] [address]  #: [phone] Route: Oral  
  
Follow-up Instructions Return in about 1 year (around 6/28/2019). Introducing [facility] & [facility]! [employer] introduces TrustAlert patient portal. Now you can access parts of your medical record, email your doctor's office, and request medication refills online. 1. In your internet browser, go to https://[employer]. [employer] 2. Click on the First Time User? Click Here link in the Sign In box. You will see the New Member Sign Up page. 3. Enter your TrustAlert Access Code exactly as it appears below. You will not need to use this code after youve completed the sign-up process.  If you do not sign up before the expiration date, you must request a new code. · Nanoradio Access Code: [email or web address] Expires: 9/23/2018  7:28 AM 
 
4. Enter the last four digits of your Social Security Number (xxxx) and Date of Birth (mm/dd/yyyy) as indicated and click Submit. You will be taken to the next sign-up page. 5. Create a Nanoradio ID. This will be your Nanoradio login ID and cannot be changed, so think of one that is secure and easy to remember. 6. Create a Nanoradio password. You can change your password at any time. 7. Enter your Password Reset Question and Answer. This can be used at a later time if you forget your password. 8. Enter your e-mail address. You will receive e-mail notification when new information is available in [address] 9. Click Sign Up. You can now view and download portions of your medical record. 10. Click the Download Summary menu link to download a portable copy of your medical information. If you have questions, please visit the Frequently Asked Questions section of the Nanoradio website. Remember, Nanoradio is NOT to be used for urgent needs. For medical emergencies, dial 911. Now available from your iPhone and Android! Please provide this summary of care documentation to your next provider. Your primary care clinician is listed as Adia Solares. If you have any questions after today's visit, please call [phone].

## 2018-06-28 NOTE — PROGRESS NOTES
1. Have you been to the ER, urgent care clinic since your last visit? Hospitalized since your last visit? No    2. Have you seen or consulted any other health care providers outside of the [address] [place] [address] since your last visit? Include any pap smears or colon screening.  No

## 2018-06-28 NOTE — PROGRESS NOTES
Chief Complaint   Patient presents with    Annual Wellness Visit         HPI:      James Chowdary is a 69 y.o. male retired [employer] ([place] employee who has a history of subclinical hyperthyroidism spanning several decades. He is taking Atenolol and no thyroid medications. No sx. Generally checks the TSH Q 6 months       He is hypertensive and is treated with Losartan and Atenolol. Denies side effects. Following an elevated A1c, he adopted a healthy diet and exercise program and has lost 15 pounds. His labs were reviewed with him today in the office (A1c 5.9).    New Issues:  He is due for SAWV        Allergies   Allergen Reactions    Ace Inhibitors Cough    Amlodipine Not Reported This Time    Claritin [Loratadine] Hives    Pcn [Penicillins] Not Reported This Time       Current Outpatient Prescriptions   Medication Sig    atorvastatin (LIPITOR) 40 mg tablet TAKE 1 TABLET BY MOUTH EVERY DAY    aspirin delayed-release 81 mg tablet Take  by mouth daily.  losartan (COZAAR) 50 mg tablet Take 1 Tab by mouth daily.  cholecalciferol, vitamin D3, (VITAMIN D3) 2,000 unit tab Take  by mouth.  omega-3 fatty acids-vitamin e (FISH OIL) 1,000 mg cap Take 1 Cap by mouth two (2) times a day. No current facility-administered medications for this visit. Past Medical History:   Diagnosis Date    Conductive hearing loss, middle ear     DM2 (diabetes mellitus, type 2) ([facility] 10/19/2016    A1c = 6.6    Elevated liver enzymes     HTN (hypertension)     Hyperlipidemia     Subclinical hyperthyroidism     Tinnitus          ROS:  Denies fever, chills, cough, chest pain, SOB,  nausea, vomiting, or diarrhea. Denies wt loss, wt gain, hemoptysis, hematochezia or melena.     Physical Examination:    /58 (BP 1 Location: Left arm, BP Patient Position: Sitting)  Pulse 64  Resp 16  Ht 5' 9.25\" (1.759 m)  Wt 184 lb 6.4 oz (83.6 kg)  SpO2 98%  BMI 27.03 kg/m2    General: Alert and Ox3, Fluent speech  HEENT:  NC/AT, EOMI, OP: clear  Neck:  Supple, no adenopathy, JVD, mass or bruit  Chest:  Clear to Ausculation, without wheezes, rales, rubs or ronchi  Cardiac: RRR  Abdomen:  +BS, soft, nontender without palpable HSM  Extremities:  No cyanosis, clubbing or edema  Neurologic:  Ambulatory without assist, CN 2-12 grossly intact. Moves all extremities. Skin: no rash  Lymphadenopathy: no cervical or supraclavicular nodes      ASSESSMENT AND PLAN:     1. Well controlled HTN:  He is at goal.  May need to back down the Losartan if he becomes hypotensive this summer  2. T2DM:  Basically he has put this into remission  3. ACP update  4. SAWV today  5. Subclinical Hyperthyroidism:  Continue to monitor every 6 months. Orders Placed This Encounter    aspirin delayed-release 81 mg tablet     Sig: Take  by mouth daily.  losartan (COZAAR) 50 mg tablet     Sig: Take 1 Tab by mouth daily. Dispense:  90 Tab     Refill:  4       Noe Garnica MD, FACP        ______________________________________________________________________    Megan López is a 69 y.o. male and presents for annual Medicare Wellness Visit. Problem List: Reviewed with patient and discussed risk factors. Patient Active Problem List   Diagnosis Code    HTN (hypertension) I10    Hyperlipidemia E78.5    Elevated liver enzymes R74.8    Low TSH level R94.6    Subclinical hyperthyroidism E05.90    DM2 (diabetes mellitus, type 2) (HCC) E11.9       Current medical providers:  Patient Care Team:  Perez Guzman MD as PCP - General (Internal Medicine)    PMH, , Medications/Allergies: reviewed, on chart. Male Alcohol Screening: On any occasion during the past 3 months, have you had more than 4 drinks containing alcohol? No    Do you average more than 14 drinks per week?   No    ROS:  Constitutional: No fever, chills or weight loss  Respiratory: No cough, SOB   CV: No chest pain or Palpitations    Objective:  Visit Vitals  /58 (BP 1 Location: Left arm, BP Patient Position: Sitting)    Pulse 64    Resp 16    Ht 5' 9.25\" (1.759 m)    Wt 184 lb 6.4 oz (83.6 kg)    SpO2 98%    BMI 27.03 kg/m2    Body mass index is 27.03 kg/(m^2). Assessment of cognitive impairment: Alert and oriented x 3    Depression Screen:   PHQ over the last two weeks 4/12/2017   Little interest or pleasure in doing things Not at all   Feeling down, depressed or hopeless Not at all   Total Score PHQ 2 0       Fall Risk Assessment:    Fall Risk Assessment, last 12 mths 4/12/2017   Able to walk? Yes   Fall in past 12 months? No       Functional Ability:   Does the patient exhibit a steady gait? yes   How long did it take the patient to get up and walk from a sitting position? 12 sec   Is the patient self reliant?  (ie can do own laundry, meals, household chores)  yes     Does the patient handle his/her own medications? yes     Does the patient handle his/her own money? yes     Is the patients home safe (ie good lighting, handrails on stairs and bath, etc.)? yes     Did you notice or did patient express any hearing difficulties? yes     Did you notice or did patient express any vision difficulties?    no       Advance Care Planning:   Patient was offered the opportunity to discuss advance care planning:  yes     Does patient have an Advance Directive:  yes   If no, did you provide information on Caring Connections?  no       Plan:      Orders Placed This Encounter    aspirin delayed-release 81 mg tablet    losartan (COZAAR) 50 mg tablet       Health Maintenance   Topic Date Due    EYE EXAM RETINAL OR DILATED Q1  07/20/1958    FOBT Q 1 YEAR AGE 50-75  07/20/1998    GLAUCOMA SCREENING Q2Y  07/20/2013    FOOT EXAM Q1  10/25/2017    MEDICARE YEARLY EXAM  04/13/2018    Influenza Age 9 to Adult  08/01/2018    HEMOGLOBIN A1C Q6M  12/25/2018    MICROALBUMIN Q1  06/25/2019    LIPID PANEL Q1  06/25/2019    DTaP/Tdap/Td series (2 - Td) 04/04/2026    Hepatitis C Screening  Completed    ZOSTER VACCINE AGE 60>  Addressed    Pneumococcal 65+ Low/Medium Risk  Completed       *Patient verbalized understanding and agreement with the plan. A copy of the After Visit Summary with personalized health plan was given to the patient today.

## 2018-07-20 LAB — HEMOCCULT STL QL IA: POSITIVE

## 2018-09-20 ENCOUNTER — ANESTHESIA (OUTPATIENT)
Dept: ENDOSCOPY | Age: 68
End: 2018-09-20
Payer: MEDICARE

## 2018-09-20 ENCOUNTER — ANESTHESIA EVENT (OUTPATIENT)
Dept: ENDOSCOPY | Age: 68
End: 2018-09-20
Payer: MEDICARE

## 2018-09-20 ENCOUNTER — HOSPITAL ENCOUNTER (OUTPATIENT)
Age: 68
Setting detail: OUTPATIENT SURGERY
Discharge: HOME | End: 2018-09-20
Attending: INTERNAL MEDICINE | Admitting: INTERNAL MEDICINE
Payer: MEDICARE

## 2018-09-20 VITALS
DIASTOLIC BLOOD PRESSURE: 58 MMHG | HEART RATE: 63 BPM | TEMPERATURE: 97.6 F | BODY MASS INDEX: 25.2 KG/M2 | OXYGEN SATURATION: 98 % | HEIGHT: 71 IN | WEIGHT: 180 LBS | RESPIRATION RATE: 18 BRPM | SYSTOLIC BLOOD PRESSURE: 138 MMHG

## 2018-09-20 PROCEDURE — 76040000019: Performed by: INTERNAL MEDICINE

## 2018-09-20 PROCEDURE — 74011250636 HC RX REV CODE- 250/636

## 2018-09-20 PROCEDURE — 74011250636 HC RX REV CODE- 250/636: Performed by: INTERNAL MEDICINE

## 2018-09-20 PROCEDURE — 76060000031 HC ANESTHESIA FIRST 0.5 HR: Performed by: INTERNAL MEDICINE

## 2018-09-20 RX ORDER — SODIUM CHLORIDE 0.9 % (FLUSH) 0.9 %
5-10 SYRINGE (ML) INJECTION EVERY 8 HOURS
Status: CANCELLED | OUTPATIENT
Start: 2018-09-20 | End: 2018-09-20

## 2018-09-20 RX ORDER — FENTANYL CITRATE 50 UG/ML
50 INJECTION, SOLUTION INTRAMUSCULAR; INTRAVENOUS
Status: CANCELLED | OUTPATIENT
Start: 2018-09-20 | End: 2018-09-20

## 2018-09-20 RX ORDER — ATROPINE SULFATE 0.1 MG/ML
0.5 INJECTION INTRAVENOUS
Status: DISCONTINUED | OUTPATIENT
Start: 2018-09-20 | End: 2018-09-20 | Stop reason: HOSPADM

## 2018-09-20 RX ORDER — FLUMAZENIL 0.1 MG/ML
0.2 INJECTION INTRAVENOUS
Status: DISCONTINUED | OUTPATIENT
Start: 2018-09-20 | End: 2018-09-20 | Stop reason: HOSPADM

## 2018-09-20 RX ORDER — MIDAZOLAM HYDROCHLORIDE 1 MG/ML
.25-5 INJECTION, SOLUTION INTRAMUSCULAR; INTRAVENOUS
Status: CANCELLED | OUTPATIENT
Start: 2018-09-20 | End: 2018-09-20

## 2018-09-20 RX ORDER — SODIUM CHLORIDE 9 MG/ML
75 INJECTION, SOLUTION INTRAVENOUS CONTINUOUS
Status: DISCONTINUED | OUTPATIENT
Start: 2018-09-20 | End: 2018-09-20 | Stop reason: HOSPADM

## 2018-09-20 RX ORDER — SODIUM CHLORIDE 0.9 % (FLUSH) 0.9 %
5-10 SYRINGE (ML) INJECTION AS NEEDED
Status: DISCONTINUED | OUTPATIENT
Start: 2018-09-20 | End: 2018-09-20 | Stop reason: HOSPADM

## 2018-09-20 RX ORDER — LIDOCAINE HYDROCHLORIDE 20 MG/ML
INJECTION, SOLUTION EPIDURAL; INFILTRATION; INTRACAUDAL; PERINEURAL AS NEEDED
Status: DISCONTINUED | OUTPATIENT
Start: 2018-09-20 | End: 2018-09-20 | Stop reason: HOSPADM

## 2018-09-20 RX ORDER — NALOXONE HYDROCHLORIDE 0.4 MG/ML
0.4 INJECTION, SOLUTION INTRAMUSCULAR; INTRAVENOUS; SUBCUTANEOUS
Status: CANCELLED | OUTPATIENT
Start: 2018-09-20 | End: 2018-09-20

## 2018-09-20 RX ORDER — FLUMAZENIL 0.1 MG/ML
0.2 INJECTION INTRAVENOUS
Status: CANCELLED | OUTPATIENT
Start: 2018-09-20 | End: 2018-09-20

## 2018-09-20 RX ORDER — SODIUM CHLORIDE 0.9 % (FLUSH) 0.9 %
5-10 SYRINGE (ML) INJECTION EVERY 8 HOURS
Status: DISCONTINUED | OUTPATIENT
Start: 2018-09-20 | End: 2018-09-20 | Stop reason: HOSPADM

## 2018-09-20 RX ORDER — NALOXONE HYDROCHLORIDE 0.4 MG/ML
0.4 INJECTION, SOLUTION INTRAMUSCULAR; INTRAVENOUS; SUBCUTANEOUS
Status: DISCONTINUED | OUTPATIENT
Start: 2018-09-20 | End: 2018-09-20 | Stop reason: HOSPADM

## 2018-09-20 RX ORDER — PROPOFOL 10 MG/ML
INJECTION, EMULSION INTRAVENOUS AS NEEDED
Status: DISCONTINUED | OUTPATIENT
Start: 2018-09-20 | End: 2018-09-20 | Stop reason: HOSPADM

## 2018-09-20 RX ORDER — DEXTROMETHORPHAN/PSEUDOEPHED 2.5-7.5/.8
1.2 DROPS ORAL
Status: DISCONTINUED | OUTPATIENT
Start: 2018-09-20 | End: 2018-09-20 | Stop reason: HOSPADM

## 2018-09-20 RX ORDER — EPINEPHRINE 0.1 MG/ML
1 INJECTION INTRACARDIAC; INTRAVENOUS
Status: DISCONTINUED | OUTPATIENT
Start: 2018-09-20 | End: 2018-09-20 | Stop reason: HOSPADM

## 2018-09-20 RX ORDER — SODIUM CHLORIDE 0.9 % (FLUSH) 0.9 %
5-10 SYRINGE (ML) INJECTION AS NEEDED
Status: CANCELLED | OUTPATIENT
Start: 2018-09-20 | End: 2018-09-20

## 2018-09-20 RX ADMIN — LIDOCAINE HYDROCHLORIDE 80 MG: 20 INJECTION, SOLUTION EPIDURAL; INFILTRATION; INTRACAUDAL; PERINEURAL at 08:29

## 2018-09-20 RX ADMIN — PROPOFOL 100 MG: 10 INJECTION, EMULSION INTRAVENOUS at 08:29

## 2018-09-20 RX ADMIN — PROPOFOL 50 MG: 10 INJECTION, EMULSION INTRAVENOUS at 08:31

## 2018-09-20 RX ADMIN — PROPOFOL 50 MG: 10 INJECTION, EMULSION INTRAVENOUS at 08:35

## 2018-09-20 RX ADMIN — SODIUM CHLORIDE 75 ML/HR: 900 INJECTION, SOLUTION INTRAVENOUS at 08:19

## 2018-09-20 RX ADMIN — PROPOFOL 50 MG: 10 INJECTION, EMULSION INTRAVENOUS at 08:40

## 2018-09-20 NOTE — H&P
Gastroenterology Outpatient History and Physical 
 
Patient: Mary Grace Ramirez Physician: Hunter Walker MD 
 
Chief Complaint: +FIT History of Present Illness: No GI complaints History: 
Past Medical History:  
Diagnosis Date  Conductive hearing loss, middle ear  DM2 (diabetes mellitus, type 2) ([address] 10/19/2016  
 resolved after weight loss  Elevated liver enzymes  HTN (hypertension)   
 resolved after weight loss 08/18  Hyperlipidemia  Subclinical hyperthyroidism  Tinnitus Past Surgical History:  
Procedure Laterality Date  HX COLONOSCOPY  2004  HX MOHS PROCEDURES Left   
 also right shoulder surgery Ca deposits spurs and rotator cuff lt Social History Social History  Marital status:  Spouse name: N/A  
 Number of children: 1  Years of education: N/A Occupational History   [employer] Social History Main Topics  Smoking status: Former Smoker Quit date: 11/24/1988  Smokeless tobacco: Never Used  Alcohol use No  
 Drug use: None  Sexual activity: Not Asked Other Topics Concern   Service No  
 Blood Transfusions No  
 Caffeine Concern No  
 Occupational Exposure No  
 Hobby Hazards No  
 Sleep Concern No  
 Stress Concern No  
 Weight Concern No  
 Special Diet No  
  cut down \"white\" stuff  Back Care No  
 Exercise Yes Body fit 2 x/day, rides bike 10 miles daily  Bike Helmet No  
 Seat Belt Yes  Self-Exams Yes Social History Narrative Family History Problem Relation Age of Onset  Cancer Father   
  smoker, throat cancer  Cancer Brother   
  bladder Patient Active Problem List  
Diagnosis Code  
 HTN (hypertension) I10  
 Hyperlipidemia E78.5  Elevated liver enzymes R74.8  Low TSH level R94.6  Subclinical hyperthyroidism E05.90  
 DM2 (diabetes mellitus, type 2) (HCC) E11.9 Allergies: Allergies Allergen Reactions  Ace Inhibitors Cough  Amlodipine Not Reported This Time  Claritin [Loratadine] Hives  Pcn [Penicillins] Not Reported This Time Medications:  
Prior to Admission medications Medication Sig Start Date End Date Taking? Authorizing Provider  
omega-3 fatty acids-vitamin e (FISH OIL) 1,000 mg cap Take 1 Cap by mouth two (2) times a day. Yes Historical Provider  
atorvastatin (LIPITOR) 40 mg tablet TAKE 1 TABLET BY MOUTH EVERY DAY 6/28/18   Cali Payton MD  
aspirin delayed-release 81 mg tablet Take  by mouth daily. Historical Provider  
cholecalciferol, vitamin D3, (VITAMIN D3) 2,000 unit tab Take  by mouth. Historical Provider Physical Exam:  
Vital Signs: Blood pressure 189/85, pulse 70, temperature 98 °F (36.7 °C), resp. rate 16, height 5' 11\" (1.803 m), weight 81.6 kg (180 lb), SpO2 99 %. General: well developed, well nourished HEENT: unremarkable Heart: regular rhythm no mumur Lungs: clear Abdominal:  benign Neurological: unremarkable Extremities: no edema Findings/Diagnosis: +FIT Plan of Care/Planned Procedure: Colonoscopy with conscious/deep sedation Signed: 
Buffy Larson MD 9/20/2018

## 2018-09-20 NOTE — ANESTHESIA PREPROCEDURE EVALUATION
Anesthetic History No history of anesthetic complications Review of Systems / Medical History Patient summary reviewed, nursing notes reviewed and pertinent labs reviewed Pulmonary Within defined limits Neuro/Psych Within defined limits Cardiovascular Within defined limits Hypertension Exercise tolerance: >4 METS 
  
GI/Hepatic/Renal 
Within defined limits Endo/Other Within defined limits Diabetes Hypothyroidism Other Findings Physical Exam 
 
Airway Mallampati: II 
TM Distance: 4 - 6 cm Neck ROM: normal range of motion Mouth opening: Normal 
 
 Cardiovascular Regular rate and rhythm,  S1 and S2 normal,  no murmur, click, rub, or gallop Dental 
 
Dentition: Upper partial plate Pulmonary Breath sounds clear to auscultation Abdominal 
GI exam deferred Other Findings Anesthetic Plan ASA: 2 Anesthesia type: general and total IV anesthesia Induction: Intravenous Anesthetic plan and risks discussed with: Patient Propofol MAC

## 2018-09-20 NOTE — ROUTINE PROCESS
Raul Ashlie 1950 
[id number] Situation: 
Verbal report received from: LINDA Lebron rn 
Procedure: Procedure(s): 
COLONOSCOPY Background: 
 
Preoperative diagnosis: POSITIVE FECAL OCCULT BLOOD TEST Postoperative diagnosis: nternal hemorrhoids :  Dr. Maldonado Sandoval Assistant(s): Endoscopy Technician-1: Tobias Backs Endoscopy RN-1: Clement Brewer RN Specimens: * No specimens in log * H. Pylori  no Assessment: 
Intra-procedure medications Anesthesia gave intra-procedure sedation and medications, see anesthesia flow sheet Intravenous fluids: NS@ [place] Tony Vital signs stable Abdominal assessment: round and soft Recommendation: 
Discharge patient per MD order. Family or Friend Permission to share finding with family or friend yes

## 2018-09-20 NOTE — ANESTHESIA POSTPROCEDURE EVALUATION
Post-Anesthesia Evaluation and Assessment Patient: Luke Giang MRN: [id number]  SSN: [id number] YOB: 1950  Age: 77 y.o. Sex: male Cardiovascular Function/Vital Signs Visit Vitals  /66  Pulse 65  Temp 36.4 °C (97.6 °F)  Resp 19  
 Ht 5' 11\" (1.803 m)  Wt 81.6 kg (180 lb)  SpO2 98%  BMI 25.1 kg/m2 Patient is status post general, total IV anesthesia anesthesia for Procedure(s): 
COLONOSCOPY. Nausea/Vomiting: None Postoperative hydration reviewed and adequate. Pain: 
Pain Scale 1: Visual (09/20/18 0900) Pain Intensity 1: 0 (09/20/18 0857) Managed Neurological Status: At baseline Mental Status and Level of Consciousness: Arousable Pulmonary Status:  
O2 Device: Room air (09/20/18 0900) Adequate oxygenation and airway patent Complications related to anesthesia: None Post-anesthesia assessment completed. No concerns Signed By: Zeny Dueñas MD   
 September 20, 2018

## 2018-09-20 NOTE — IP AVS SNAPSHOT
[address] [facility] 
[phone] Patient: Peggy Tan MRN: [id number] QNM:[id number] About your hospitalization You were admitted on:  September 20, 2018 You last received care in the:  [facility] END[facility] You were discharged on:  September 20, 2018 Why you were hospitalized Your primary diagnosis was:  Not on File Follow-up Information Follow up With Details Comments Contact Info David Pete MD   [address] [facility] [facility] [phone] Discharge Orders None A check veronica indicates which time of day the medication should be taken. My Medications CONTINUE taking these medications Instructions Each Dose to Equal  
 Morning Noon Evening Bedtime  
 aspirin delayed-release 81 mg tablet Your last dose was: Your next dose is: Take  by mouth daily. atorvastatin 40 mg tablet Commonly known as:  LIPITOR Your last dose was: Your next dose is: TAKE 1 TABLET BY MOUTH EVERY DAY  
     
   
   
   
  
 FISH OIL 1,000 mg Cap Generic drug:  omega-3 fatty acids-vitamin e Your last dose was: Your next dose is: Take 1 Cap by mouth two (2) times a day. 1 Cap VITAMIN D3 2,000 unit Tab Generic drug:  cholecalciferol (vitamin D3) Your last dose was: Your next dose is: Take  by mouth. Discharge Instructions Peggy Tan [id number] 
1950 COLON DISCHARGE INSTRUCTIONS Discomfort: 
Redness at IV site- apply warm compress to area; if redness or soreness persist- contact your physician There may be a slight amount of blood passed from the rectum Gaseous discomfort- walking, belching will help relieve any discomfort You may not operate a vehicle for 12 hours You may not engage in an occupation involving machinery or appliances for rest of today You may not drink alcoholic beverages for at least 12 hours Avoid making any critical decisions for at least 24 hour DIET: 
 Regular diet.  however -  remember your colon is empty and a heavy meal will produce gas. Avoid these foods:  vegetables, fried / greasy foods, carbonated drinks for today MEDICATION: 
Per Medication Reconciliation ACTIVITY: 
You may not resume your normal daily activities until tomorrow AM; it is recommended that you spend the remainder of the day resting -  avoid any strenuous activity. CALL M.D. ANY SIGN OF: Increasing pain, nausea, vomiting Abdominal distension (swelling) New increased bleeding (oral or rectal) Fever (chills) Pain in chest area Bloody discharge from nose or mouth Shortness of breath You may  take any Advil, Aspirin, Ibuprofen, Motrin, Aleve, or Goodys for 10 days, ONLY  Tylenol as needed for pain. IMPRESSION: 
Impression: 1. Normal colonoscopy through to the cecum 2. Medium sized internal hemorrhoids seen on retroflexion Recommendations: 1. Repeat colonoscopy for screening in 10 years Follow-up Instructions: 
Telephone # [phone] Elizabeth Yang MD 
 
 
 
ACO Transitions of Care [facility] offers a voluntary care coordination program to provide high quality service and care to [place] fee-for-service beneficiaries. Lilia Mario was designed to help you enhance your health and well-being through the following services: ? Transitions of Care  support for individuals who are transitioning from one care setting to another (example: Hospital to home). ?  Chronic and Complex Care Coordination  support for individuals and caregivers of those with serious or chronic illnesses or with more than one chronic (ongoing) condition and those who take a number of different medications. If you meet specific medical criteria, a [facility] [address] may call you directly to coordinate your care with your primary care physician and your other care providers. For questions about the [facility] programs, please, contact your physicians office. For general questions or additional information about Accountable Care Organizations: 
Please visit www.medicare.gov/acos. html or call 1-800-MEDICARE (1-453.529.7980) TTY users should call 2-792.633.9288. Introducing [facility]! Ibrahima Porfirio introduces [employer] Mobile patient portal. Now you can access parts of your medical record, email your doctor's office, and request medication refills online. 1. In your internet browser, go to [email or web address] 2. Click on the First Time User? Click Here link in the Sign In box. You will see the New Member Sign Up page. 3. Enter your Y[employer]ango Mobile Access Code exactly as it appears below. You will not need to use this code after youve completed the sign-up process. If you do not sign up before the expiration date, you must request a new code. · Y[employer] Mobile Access Code: [email or web address] Expires: 9/23/2018  7:28 AM 
 
4. Enter the last four digits of your Social Security Number (xxxx) and Date of Birth (mm/dd/yyyy) as indicated and click Submit. You will be taken to the next sign-up page. 5. Create a Yatango Mobile ID. This will be your Y[employer]ango Mobile login ID and cannot be changed, so think of one that is secure and easy to remember. 6. Create a [employer] Mobile password. You can change your password at any time. 7. Enter your Password Reset Question and Answer. This can be used at a later time if you forget your password. 8. Enter your e-mail address. You will receive e-mail notification when new information is available in [address] 9. Click Sign Up. You can now view and download portions of your medical record. 10. Click the Download Summary menu link to download a portable copy of your medical information. If you have questions, please visit the Frequently Asked Questions section of the Inquisitive Systemst website. Remember, tomoguides is NOT to be used for urgent needs. For medical emergencies, dial 911. Now available from your iPhone and Android! Introducing Zhang Simms As a [facility] patient, I wanted to make you aware of our electronic visit tool called Zhang Simms. FanANPI 24/7 allows you to connect within minutes with a medical provider 24 hours a day, seven days a week via a mobile device or tablet or logging into a secure website from your computer. You can access Zhang Simms from anywhere in the [place]. A virtual visit might be right for you when you have a simple condition and feel like you just dont want to get out of bed, or cant get away from work for an appointment, when your regular [facility] provider is not available (evenings, weekends or holidays), or when youre out of town and need minor care. Electronic visits cost only $49 and if the Fan[facility]lal[employer] [facility] 24/7 provider determines a prescription is needed to treat your condition, one can be electronically transmitted to a nearby pharmacy*. Please take a moment to enroll today if you have not already done so. The enrollment process is free and takes just a few minutes. To enroll, please download the Partnerpedia 24/7 yeny to your tablet or phone, or visit [email or web address] to enroll on your computer. And, as an [address] [place] [address] patient with a [employer] For Kids account, the results of your visits will be scanned into your electronic medical record and your primary care provider will be able to view the scanned results.    
We urge you to continue to see your regular [facility] provider for your ongoing medical care. And while your primary care provider may not be the one available when you seek a Zhang Simms virtual visit, the peace of mind you get from getting a real diagnosis real time can be priceless. For more information on Zhang Simms, view our Frequently Asked Questions (FAQs) at [email or web address]. Sincerely, 
 
Valentino Milks, MD 
Chief Medical Officer [employer] *:  certain medications cannot be prescribed via Zhang Simms Providers Seen During Your Hospitalization Provider Specialty Primary office phone Teri Teixeira MD Gastroenterology [phone] Your Primary Care Physician (PCP) Primary Care Physician Office Phone Office Fax Genny Ramírez [phone] You are allergic to the following Allergen Reactions Ace Inhibitors Cough Amlodipine Not Reported This Time Claritin (Loratadine) Hives Pcn (Penicillins) Not Reported This Time Recent Documentation Height Weight BMI Smoking Status 1.803 m 81.6 kg 25.1 kg/m2 Former Smoker Emergency Contacts Name Discharge Info Relation Home Work Mobile [employer] DISCHARGE CAREGIVER [3] Spouse [3] [phone] Patient Belongings The following personal items are in your possession at time of discharge: 
  Dental Appliances: Partials, Uppers  Visual Aid: Glasses Please provide this summary of care documentation to your next provider. Signatures-by signing, you are acknowledging that this After Visit Summary has been reviewed with you and you have received a copy. Patient Signature:  ____________________________________________________________ Date:  ____________________________________________________________  
  
Hallie Osuna Provider Signature:  ____________________________________________________________ Date:  ____________________________________________________________

## 2018-09-20 NOTE — PROCEDURES
NAME:  Abbi Smyth   :   1950   MRN:   [id number]     Date/Time:  2018 8:46 AM    Colonoscopy Operative Report    Procedure Type:   Colonoscopy --diagnostic     Indications:     Occult blood in stool  Pre-operative Diagnosis: see indication above  Post-operative Diagnosis:  See findings below  :  Gwendolyn Miller MD  Referring Provider: --Amanda Pike MD    Exam:  Airway: clear, no airway problems anticipated  Heart: RRR, without gallops or rubs  Lungs: clear bilaterally without wheezes, crackles, or rhonchi  Abdomen: soft, nontender, nondistended, bowel sounds present  Mental Status: awake, alert and oriented to person, place and time    Sedation:  MAC anesthesia Propofol  Procedure Details:  After informed consent was obtained with all risks and benefits of procedure explained and preoperative exam completed, the patient was taken to the endoscopy suite and placed in the left lateral decubitus position. Upon sequential sedation as per above, a digital rectal exam was performed demonstrating internal hemorrhoids. The Olympus videocolonoscope  was inserted in the rectum and carefully advanced to the cecum, which was identified by the ileocecal valve and appendiceal orifice. The quality of preparation was good. The colonoscope was slowly withdrawn with careful evaluation between folds. Retroflexion in the rectum was completed demonstrating internal hemorrhoids. Findings:   1. Normal colonoscopy through to the cecum  2. Medium sized internal hemorrhoids seen on retroflexion    Specimen Removed:  None  Complications: None. EBL:  None. Impression:   1. Normal colonoscopy through to the cecum  2. Medium sized internal hemorrhoids seen on retroflexion    Recommendations:   1. Repeat colonoscopy for screening in 10 years    Discharge Disposition:  Home in the company of a  when able to ambulate.       Reese Moseley MD

## 2018-09-20 NOTE — DISCHARGE INSTRUCTIONS
Niraj Davis  [id number]  1950    COLON DISCHARGE INSTRUCTIONS  Discomfort:  Redness at IV site- apply warm compress to area; if redness or soreness persist- contact your physician  There may be a slight amount of blood passed from the rectum  Gaseous discomfort- walking, belching will help relieve any discomfort  You may not operate a vehicle for 12 hours  You may not engage in an occupation involving machinery or appliances for rest of today  You may not drink alcoholic beverages for at least 12 hours  Avoid making any critical decisions for at least 24 hour  DIET:   Regular diet. - however -  remember your colon is empty and a heavy meal will produce gas. Avoid these foods:  vegetables, fried / greasy foods, carbonated drinks for today  MEDICATION:  Per Medication Reconciliation       ACTIVITY:  You may not resume your normal daily activities until tomorrow AM; it is recommended that you spend the remainder of the day resting -  avoid any strenuous activity. CALL M.D. ANY SIGN OF:   Increasing pain, nausea, vomiting  Abdominal distension (swelling)  New increased bleeding (oral or rectal)  Fever (chills)  Pain in chest area  Bloody discharge from nose or mouth  Shortness of breath    You may  take any Advil, Aspirin, Ibuprofen, Motrin, Aleve, or Goodys for 10 days, ONLY  Tylenol as needed for pain. IMPRESSION:  Impression:   1. Normal colonoscopy through to the cecum  2. Medium sized internal hemorrhoids seen on retroflexion    Recommendations:   1.  Repeat colonoscopy for screening in 10 years    Follow-up Instructions:  Telephone # [phone]    Heather Castanon MD

## 2020-01-09 ENCOUNTER — TELEPHONE (OUTPATIENT)
Dept: ENDOCRINOLOGY | Age: 70
End: 2020-01-09

## 2020-01-09 NOTE — TELEPHONE ENCOUNTER
----- Message from Solange Sherman sent at 1/9/2020 12:41 PM EST -----  Regarding: Dr. Duane Markham first and last name:      Reason for call: Pt is asking can her provider or nurse give her a call. Pt is requesting specific instructions on how to take her insulin before and the day of her colonoscopy that takes place on February 6th.         Callback required yes/no and why:Y      Best contact number(s):[phone]      Details to clarify the request:      Sherri Raines

## 2020-03-09 ENCOUNTER — OFFICE VISIT (OUTPATIENT)
Dept: ENDOCRINOLOGY | Age: 70
End: 2020-03-09

## 2020-03-09 VITALS
SYSTOLIC BLOOD PRESSURE: 180 MMHG | HEIGHT: 71 IN | HEART RATE: 67 BPM | DIASTOLIC BLOOD PRESSURE: 82 MMHG | BODY MASS INDEX: 26.32 KG/M2 | WEIGHT: 188 LBS

## 2020-03-09 DIAGNOSIS — E05.90 SUBCLINICAL HYPERTHYROIDISM: Primary | ICD-10-CM

## 2020-03-09 DIAGNOSIS — E04.2 MULTINODULAR GOITER: ICD-10-CM

## 2020-03-09 RX ORDER — METHIMAZOLE 5 MG/1
5 TABLET ORAL DAILY
Qty: 30 TAB | Refills: 3 | Status: SHIPPED | OUTPATIENT
Start: 2020-03-09 | End: 2020-06-29

## 2020-03-09 NOTE — PROGRESS NOTES
Chief Complaint   Patient presents with    Thyroid Problem     pcp and pharmacy verified     History of Present Illness: Raul Dailey is a 68 y.o. male who I was asked to see in consult by Dr. Jacob Maldonado for evaluation of subclinical hyperthyroidism and Multinodular goiter. In 2019, as part of his annual wellness visit he had TFTs drawn. His TSH was 0.050 with a TT4 of 9.7. He was sent for a thyroid Uptake and Scan, which did not show any evidence of hot nodules or hyperactivity, but it did show an area of photopenia in the left lobe. He had repeat TFTs in 2020 and his TSH was 0.016 with FT4 of 1.69, TT3 of 124 and Thyroid Ab Panel was negative. He was then sent for a thyroid US which showed multiple nodules 4.7cm in the left (the region of photopenia and increased blood flow) and 2.5cm in the right lobe. Pt notes he was first diagnosed with hyperthyroid  \"over 30 years ago\". He has never been treated for hyperthyroidism/subclinical hyperthyroidism. Dr. Oswald Romero notes that his BPs have been increasing and he is concerned that this could be the cause. Pt denies issues of CP, SOB, palpitations, tremors, heat or cold intolerance, diarrhea or constipation. He denies issues of dysphagia or dysphonia. He denies issues of unexplained weight loss. Pt notes that he did lose about 40 pounds with diet and exercise. \"I am a gym rat, I like spending time in the gym and working out\". No personal hx of cancer. No known family hx of thyroid issues. His bother has pancreatic cancer and his father  from throat cancer. No known exposures to ionizing radiation.       Past Medical History:   Diagnosis Date    Conductive hearing loss, middle ear     DM2 (diabetes mellitus, type 2) ([facility] [address] 10/19/2016    resolved after weight loss    Elevated liver enzymes     HTN (hypertension)     resolved after weight loss     Hyperlipidemia     Subclinical hyperthyroidism     Tinnitus      Past Surgical History:   Procedure Laterality Date    COLONOSCOPY N/A 9/20/2018    COLONOSCOPY performed by Alex Boothe MD at [facility] END[facility]COPY    HX COLONOSCOPY  2004    HX MOHS PROCEDURES Left     also right shoulder surgery Ca deposits spurs and rotator cuff lt     Current Outpatient Medications   Medication Sig    methIMAzole (TAPAZOLE) 5 mg tablet Take 1 Tab by mouth daily.  metoprolol succinate (TOPROL-XL) 25 mg XL tablet Take 1 Tab by mouth daily.  hydroCHLOROthiazide (HYDRODIURIL) 12.5 mg tablet Take 1 Tab by mouth daily.  atorvastatin (LIPITOR) 40 mg tablet TAKE 1 TABLET BY MOUTH EVERY DAY    cholecalciferol, vitamin D3, (VITAMIN D3) 2,000 unit tab Take  by mouth.  omega-3 fatty acids-vitamin e (FISH OIL) 1,000 mg cap Take 1 Cap by mouth two (2) times a day. No current facility-administered medications for this visit.       Allergies   Allergen Reactions    Ace Inhibitors Cough    Amlodipine Not Reported This Time    Claritin [Loratadine] Hives    Pcn [Penicillins] Not Reported This Time     Family History   Problem Relation Age of Onset    Alzheimer Mother     Cancer Father         smoker, throat cancer    Cancer Brother         pancreatic    Arthritis-osteo Brother     No Known Problems Maternal Grandmother     Pneumonia Maternal Grandfather     No Known Problems Paternal Grandmother     No Known Problems Paternal Grandfather     No Known Problems Brother      Social History     Socioeconomic History    Marital status:      Spouse name: Not on file    Number of children: 1    Years of education: Not on file    Highest education level: Not on file   Occupational History    Occupation:  [employer]   Social Needs    Financial resource strain: Not on file    Food insecurity:     Worry: Not on file     Inability: Not on file    Transportation needs:     Medical: Not on file     Non-medical: Not on file   Tobacco Use    Smoking status: Former Smoker Last attempt to quit: 1988     Years since quittin.3    Smokeless tobacco: Never Used   Substance and Sexual Activity    Alcohol use: No     Alcohol/week: 0.0 standard drinks    Drug use: Never    Sexual activity: Not on file   Lifestyle    Physical activity:     Days per week: Not on file     Minutes per session: Not on file    Stress: Not on file   Relationships    Social connections:     Talks on phone: Not on file     Gets together: Not on file     Attends Shinto service: Not on file     Active member of club or organization: Not on file     Attends meetings of clubs or organizations: Not on file     Relationship status: Not on file    Intimate partner violence:     Fear of current or ex partner: Not on file     Emotionally abused: Not on file     Physically abused: Not on file     Forced sexual activity: Not on file   Other Topics Concern     Service No    Blood Transfusions No    Caffeine Concern No    Occupational Exposure No    Hobby Hazards No    Sleep Concern No    Stress Concern No    Weight Concern No    Special Diet No     Comment: cut down \"white\" stuff    Back Care No    Exercise Yes     Comment: Body fit 2 x/day, rides bike 10 miles daily    Bike Helmet No    Seat Belt Yes    Self-Exams Yes   Social History Narrative    Not on file     Review of Systems:  - Constitutional Symptoms: no fevers, chills, weight loss  - Eyes: no blurry vision or double vision  - Cardiovascular: no chest pain or palpitations  - Respiratory: no cough or shortness of breath  - Gastrointestinal: no dysphagia or abdominal pain  - Musculoskeletal: no joint pains or weakness  - Integumentary: no rashes  - Neurological: no numbness, tingling, or headaches  - Psychiatric: no depression or anxiety  - Endocrine: no heat or cold intolerance, no polyuria or polydipsia    Physical Examination:  Blood pressure 180/82, pulse 67, height 5' 11\" (1.803 m), weight 188 lb (85.3 kg).   - General: pleasant, no distress, good eye contact  - HEENT: no exopthalmos, no periorbital edema, no scleral/conjunctival injection, EOMI, no lid lag or stare  - Neck: supple, + thyroid nodules palpated in the left and right lobes, no LAD, no thyroid bruits, or carotid bruits, no supraclavicular or dorsocervical fat pads  - Cardiovascular: regular, normal rate, normal S1 and S2, no murmurs/rubs/gallops   - Respiratory: clear to auscultation bilaterally  - Gastrointestinal: soft, nontender, nondistended, no masses, no hepatosplenomegaly  - Musculoskeletal: no proximal muscle weakness in upper or lower extremities  - Integumentary: no acanthosis nigricans, no rashes, no edema  - Neurological: reflexes 2+ at biceps, no tremor  - Psychiatric: normal mood and affect    Data Reviewed:   Component      Latest Ref Rng & Units 1/14/2020 1/14/2020 1/14/2020 1/14/2020           2:13 PM  2:13 PM  2:13 PM  2:13 PM   Thyroid peroxidase Ab      0 - 34 IU/mL  7     Thyroglobulin Ab      0.0 - 0.9 IU/mL  <1.0     T4, Total      4.5 - 12.0 ug/dL       TSH      0.450 - 4.500 uIU/mL    0.016 (L)   T4, Free      0.82 - 1.77 ng/dL       T3, total      71 - 180 ng/dL   124    Triiodothyronine (T3), free      2.0 - 4.4 pg/mL 3.9        Component      Latest Ref Rng & Units 1/14/2020 1/14/2020 10/17/2019 10/17/2019           2:13 PM  2:13 PM  7:17 AM  7:17 AM   Thyroid peroxidase Ab      0 - 34 IU/mL       Thyroglobulin Ab      0.0 - 0.9 IU/mL       T4, Total      4.5 - 12.0 ug/dL  9.8  9.7   TSH      0.450 - 4.500 uIU/mL   0.050 (L)    T4, Free      0.82 - 1.77 ng/dL 1.69      T3, total      71 - 180 ng/dL       Triiodothyronine (T3), free      2.0 - 4.4 pg/mL       Thyroid U&S (I reviewed personally)  INDICATION: Thyrotoxicosis     TECHNIQUE:  Nuclear thyroid scan and uptake were obtained 24 hours following p.o.  administration of 303 uCi Iodine 123.  Imaging was performed in the anterior and  bilateral oblique projections.     FINDINGS:  24-hour thyroid radioiodine uptake is 18% (normal: 10% - 30%).    The thyroid gland demonstrates a photopenic defect in the lower pole of the left  gland with otherwise normal tracer distribution.     IMPRESSION  IMPRESSION: Photopenic defect lower pole left gland. Correlation with ultrasound  is recommended. Uptake is within normal limits. - thyroid ultrasound report (I reviewed personally)  EXAM: US THYROID/PARATHYROID/SOFT TISS      INDICATION: Abnormal nuclear medicine thyroid scan/enlarged neck.     COMPARISON: The radionuclide thyroid scan of 11/6/2019 is available for  correlation. .     TECHNIQUE: Real-time sonography of the thyroid gland was performed with a high  frequency linear transducer. Multiple static images were obtained.     FINDINGS: There is enlargement of the thyroid gland. Specifically, the right  lobe of the gland measures 7.4 cm longitudinally and the left lobe of the gland  measures 8.5 cm longitudinally. The isthmus measures 1.1 cm in thickness. There  is inhomogeneity of the thyroid echo pattern. Focal nodules are noted within  both lobes of the gland. In the lower pole of the left lobe of the gland, a 4.7  cm x 2.9 cm x 4 cm complex nodule is noted. Internal blood flow is associated  with this lesion. This corresponds to the location of the area of photopenia  noted on the radionuclide thyroid scan of 11/6/2019. In the lower pole of the  right lobe of the gland, a 2.5 cm x 3 cm x 1.2 cm complex,  isoechoic/hypervascular lesion is present. In the upper pole of the right lobe,  a hypoechoic nodule (measuring 1 cm x 0.8 cm x 1 cm in size) is present. Internal blood flow is associated with this nodule.     IMPRESSION  IMPRESSION: Enlarged, inhomogeneous appearing thyroid gland with the presence of  nodules bilaterally as described above. Assessment/Plan:   1. Subclinical hyperthyroidism    2.  Multinodular goiter    1) We discussed at length the Pituitary-Thyroid Axis, and the function of TSH on the thyroid gland and how it is used to help assess thyroid function in the body. His TSH is quite low and is <0.1 and research has shown that people at his age, with subclinical hyperthyroidism are at increased risk for Afib. Will start pt on MMI 5mg daily with the goal of normalizing his TSH, without causing coy hypothyroidism. Reviewed potential side effects of MMI including rash, n/v, arthralgias, elevated LFTs, and low WBC count and what to do if these symptoms should occur. Will repeat TFTs in 6 weeks. 2) We discussed at length thyroid nodules and thyroid cancer. Will send pt for an FNA of the Left thyroid nodule and the dominate right nodule to look for evidence of thyroid cancer. Pt voices understanding and agreement with the plan. RTC 4 months    Patient Instructions          Thyroid Nodules: Care Instructions  Your Care Instructions  Thyroid nodules are growths or lumps in the thyroid gland. Your thyroid is in the front of your neck. It controls how your body uses energy. You may have tests to see if the nodule is caused by cancer. Most nodules aren't cancer and don't cause problems. Many don't even need treatment. If you do have cancer, it can usually be cured. Treatment will probably include surgery. You may also get radioactive iodine treatment. If your thyroid can't make thyroid hormone after treatment, you can take a pill every day to replace the hormone. Follow-up care is a key part of your treatment and safety. Be sure to make and go to all appointments, and call your doctor if you are having problems. It's also a good idea to know your test results and keep a list of the medicines you take. How can you care for yourself at home? · Be safe with medicines. If you take thyroid hormone medicine:  ? Take it exactly as prescribed. Call your doctor if you think you are having a problem with your medicine.  If you take the right amount and don't skip doses, you probably won't have side effects. ? Do not take it with calcium, vitamins, or iron. ? Try not to miss a dose. ? Do not take extra doses. This will not help you get better any faster. It may also cause side effects. ? Tell your doctor about any medicines you take. This includes over-the-counter medicines. ? Wear a medical alert bracelet or necklace that says you take thyroid hormones. You can buy these at most drugstores. When should you call for help? Call 911 anytime you think you may need emergency care. For example, call if:    · You lose consciousness.    Call your doctor now or seek immediate medical care if:    · You have shortness of breath.    Watch closely for changes in your health, and be sure to contact your doctor if:    · You have pain in your neck, jaw, or ear.     · You have problems swallowing.     · You feel weak and tired.     · You have nervousness, a fast heartbeat, hand tremors, problems sleeping, increased sweating, and weight loss.     · You do not feel better even though you are taking your medicine. Where can you learn more? Go to http://calinParcelPointkendra.info/. Enter J477 in the search box to learn more about \"Thyroid Nodules: Care Instructions. \"  Current as of: November 6, 2018  Content Version: 12.2  © 6715-0563 [employer]. Care instructions adapted under license by [employer] (which disclaims liability or warranty for this information). If you have questions about a medical condition or this instruction, always ask your healthcare professional. [place] [address] any warranty or liability for your use of this information. Hyperthyroidism: Care Instructions  Your Care Instructions  Hyperthyroidism occurs when the thyroid gland makes too much thyroid hormone. This speeds up your metabolismhow your body uses energy.  This condition can cause you to be very active, lose weight, and have sleep problems, eye problems, and a fast heart rate. It can also cause a goiter. A goiter is an enlarged thyroid gland that you can see at the front of the neck. Hyperthyroidism is often caused by Graves' disease. In Graves' disease, the body's defense (immune) system attacks the thyroid gland. Your doctor may prescribe a beta-blocker medicine to slow your pulse and calm you down. But this is not a treatment for hyperthyroidism. It is given for your fast heart rate. Your doctor may also give you antithyroid medicine. This medicine keeps excess thyroid hormone in check. In some cases, doctors recommend radioactive iodine or surgery to remove the thyroid. After either of these treatments, you may need to take medicine to replace thyroid hormone for the rest of your life. Follow-up care is a key part of your treatment and safety. Be sure to make and go to all appointments, and call your doctor if you are having problems. It's also a good idea to know your test results and keep a list of the medicines you take. How can you care for yourself at home? · Take your medicines exactly as prescribed. You need to take the thyroid medicine at the same time each day. Call your doctor if you think you are having a problem with your medicine. · Graves' disease can make your eyes sore. Use artificial tears, eye drops, and sunglasses to protect your eyes from dryness, wind, and sun. Raise your head with pillows at night to prevent your eyes from swelling. In some cases, taping your eyelids shut at night will keep your eyes from being dry in the morning. · Make sure you get enough calcium. Foods that are rich in calcium include milk, yogurt, cheese, and dark green vegetables. · If you need to gain weight, ask your doctor about special diets. · Do not eat kelp. Clementeen Maffucci is high in iodine, which can make hyperthyroidism worse. Clementeen Maffucci is commonly used in [employer] and other [employer] foods. You can use iodized salt and eat bread and seafood. Try to eat a balanced diet.   · Do not use caffeine and other stimulants. These can make symptoms worse, such as a fast heartbeat, nervousness, and problems focusing. · Do not smoke. Smoking can make your condition worse and may lead to more serious eye problems. If you need help quitting, talk to your doctor about stop-smoking programs and medicines. These can increase your chances of quitting for good. · Lower your stress. Learn to use biofeedback, guided imagery, meditation, or other methods to relax. · Use creams or ointments for irritated skin. Ask your doctor which type to use. · Tell all your doctors about your condition. They need to know because some medicines contain iodine. When should you call for help? Call your doctor now or seek immediate medical care if:    · You have symptoms of a sudden, very high thyroid level (thyroid storm). These include:  ? Being nauseated, vomiting, and having diarrhea. ? Sweating a lot. ? Feeling extremely restless and confused. ? Having a high fever. ? Having a fast heartbeat.     · You have sudden vision changes or eye pain.     · You have a fever or severe sore throat and are taking antithyroid medicines, such as PTU or methimazole.    Watch closely for changes in your health, and be sure to contact your doctor if:    · You have a sore throat or have problems swallowing.     · You have swollen, itchy, or red eyes or your other eye symptoms get worse, or you have new vision problems.     · You have signs of a low thyroid level (hypothyroidism). You may feel very tired, confused, or weak. Where can you learn more? Go to http://calin-kendra.info/. Enter G224 in the search box to learn more about \"Hyperthyroidism: Care Instructions. \"  Current as of: November 6, 2018  Content Version: 12.2  © 7695-2857 [employer]. Care instructions adapted under license by [employer] (which disclaims liability or warranty for this information).  If you have questions about a medical condition or this instruction, always ask your healthcare professional. [address] any warranty or liability for your use of this information. Methimazole (By mouth)   Methimazole (meth-IM-a-zole)  Treats hyperthyroidism (too much thyroid hormone produced by the thyroid gland). Brand Name(s): Tapazole   There may be other brand names for this medicine. When This Medicine Should Not Be Used: You should not use this medicine if you have had an allergic reaction to methimazole or carbimazole, or if you are pregnant or breastfeeding. How to Use This Medicine:   Tablet  · Your doctor will tell you how much of this medicine to take and how often. Do not take more medicine or take it more often than your doctor tells you to. · You may take this medicine with or without food. · It is best to take this medicine at the same time every day. If a dose is missed:   · If you miss a dose or forget to take your medicine, take it as soon as you can. If it is almost time for your next dose, wait until then to take the medicine and skip the missed dose. · Do not use extra medicine to make up for a missed dose. How to Store and Dispose of This Medicine:   · Store the medicine at room temperature, away from heat, moisture, and direct light. · Keep all medicine out of the reach of children and never share your medicine with anyone. Drugs and Foods to Avoid:   Ask your doctor or pharmacist before using any other medicine, including over-the-counter medicines, vitamins, and herbal products. · Make sure your doctor knows if you are using a blood thinner (Coumadin®), blood pressure medicine (such as atenolol, metoprolol, propranolol, Corgard®, Inderal®, Lopressor®, Toprol®, Tenormin®), digoxin (Lanoxin®), or theophylline (Graeme-Dur®). Warnings While Using This Medicine:   · Using this medicine while you are pregnant can harm your unborn baby.  Use an effective form of birth control to keep from getting pregnant. If you think you have become pregnant while using the medicine, tell your doctor right away. · Make sure any doctor or dentist who treats you knows that you are using this medicine. Possible Side Effects While Using This Medicine:   Call your doctor right away if you notice any of these side effects:  · Dark-colored urine, pale stools  · Fever, chills, cough, sore throat  · Nausea, vomiting, loss of appetite, pain in the upper stomach  · Numbness, tingling, or burning in your hands or feet  · Severe skin rash or itching  · Unusual bruising or bleeding  · Unusual weakness or tiredness  · Yellow skin or eyes  If you notice these less serious side effects, talk with your doctor:   · Dizziness  · Joint pain  · Mild nausea and vomiting, stomach upset  · Mild skin rash  If you notice other side effects that you think are caused by this medicine, tell your doctor. Call your doctor for medical advice about side effects. You may report side effects to FDA at 0-300-FDA-8350  © 2017 [facility] Information is for End User's use only and may not be sold, redistributed or otherwise used for commercial purposes. The above information is an  only. It is not intended as medical advice for individual conditions or treatments. Talk to your doctor, nurse or pharmacist before following any medical regimen to see if it is safe and effective for you. Follow-up and Dispositions    · Return in about 4 months (around 7/9/2020).          Copy sent to:  Dr. Lee Louis

## 2020-04-20 DIAGNOSIS — E05.90 SUBCLINICAL HYPERTHYROIDISM: ICD-10-CM

## 2020-05-27 ENCOUNTER — HOSPITAL ENCOUNTER (OUTPATIENT)
Dept: ULTRASOUND IMAGING | Age: 70
Discharge: HOME | End: 2020-05-27
Attending: INTERNAL MEDICINE
Payer: MEDICARE

## 2020-05-27 DIAGNOSIS — E04.2 MULTINODULAR GOITER: ICD-10-CM

## 2020-05-27 PROCEDURE — 10006 FNA BX W/US GDN EA ADDL: CPT

## 2020-05-27 PROCEDURE — 88173 CYTOPATH EVAL FNA REPORT: CPT

## 2020-05-27 PROCEDURE — 10005 FNA BX W/US GDN 1ST LES: CPT

## 2020-05-27 PROCEDURE — 88172 CYTP DX EVAL FNA 1ST EA SITE: CPT

## 2020-05-27 RX ORDER — LIDOCAINE HYDROCHLORIDE 10 MG/ML
8 INJECTION, SOLUTION EPIDURAL; INFILTRATION; INTRACAUDAL; PERINEURAL
Status: DISCONTINUED | OUTPATIENT
Start: 2020-05-27 | End: 2020-05-28 | Stop reason: HOSPADM

## 2020-06-01 NOTE — PROGRESS NOTES
Spoke with pt regarding his FNA results. The pathology on the FNAs came back benign. Will repeat his Thyroid US in a year. Pt voices understanding and agreement with the plan.

## 2020-12-14 DIAGNOSIS — I10 ESSENTIAL HYPERTENSION: ICD-10-CM

## 2020-12-14 RX ORDER — ATORVASTATIN CALCIUM 40 MG/1
TABLET, FILM COATED ORAL
Qty: 90 TAB | Refills: 4 | Status: SHIPPED | OUTPATIENT
Start: 2020-12-14 | End: 2021-03-05 | Stop reason: SDUPTHER

## 2020-12-14 RX ORDER — HYDROCHLOROTHIAZIDE 12.5 MG/1
TABLET ORAL
Qty: 90 TAB | Refills: 3 | Status: SHIPPED | OUTPATIENT
Start: 2020-12-14 | End: 2021-03-09 | Stop reason: SDUPTHER

## 2020-12-14 RX ORDER — METHIMAZOLE 5 MG/1
TABLET ORAL
Qty: 30 TAB | Refills: 1 | Status: SHIPPED | OUTPATIENT
Start: 2020-12-14 | End: 2020-12-17 | Stop reason: SDUPTHER

## 2020-12-14 RX ORDER — METOPROLOL SUCCINATE 25 MG/1
TABLET, EXTENDED RELEASE ORAL
Qty: 90 TAB | Refills: 3 | Status: SHIPPED | OUTPATIENT
Start: 2020-12-14 | End: 2021-03-04 | Stop reason: SDUPTHER

## 2020-12-14 NOTE — TELEPHONE ENCOUNTER
Patient has an appointment with Lars Garcia on 02/08/2020. Lab orders have been mailed to patient (ordered prior).

## 2020-12-14 NOTE — TELEPHONE ENCOUNTER
[facility] (advised to call and schedule appt, if desired to stay with , then call my VM to let me know the date to order labs).

## 2020-12-17 DIAGNOSIS — E05.90 SUBCLINICAL HYPERTHYROIDISM: ICD-10-CM

## 2020-12-17 DIAGNOSIS — E04.2 MULTINODULAR GOITER: Primary | ICD-10-CM

## 2020-12-17 RX ORDER — METHIMAZOLE 5 MG/1
TABLET ORAL
Qty: 90 TAB | Refills: 0 | Status: SHIPPED | OUTPATIENT
Start: 2020-12-17 | End: 2021-02-21

## 2021-01-30 LAB
T3 SERPL-MCNC: 113 NG/DL (ref 71–180)
T4 FREE SERPL-MCNC: 1.26 NG/DL (ref 0.82–1.77)
TSH RECEP AB SER-ACNC: <1.1 IU/L (ref 0–1.75)
TSH SERPL DL<=0.005 MIU/L-ACNC: 2.29 UIU/ML (ref 0.45–4.5)
TSI SER-ACNC: <0.1 IU/L (ref 0–0.55)

## 2021-02-08 ENCOUNTER — VIRTUAL VISIT (OUTPATIENT)
Dept: ENDOCRINOLOGY | Age: 71
End: 2021-02-08
Payer: MEDICARE

## 2021-02-08 DIAGNOSIS — E05.90 SUBCLINICAL HYPERTHYROIDISM: ICD-10-CM

## 2021-02-08 DIAGNOSIS — E04.2 MULTINODULAR GOITER: Primary | ICD-10-CM

## 2021-02-08 PROCEDURE — 99443 PR PHYS/QHP TELEPHONE EVALUATION 21-30 MIN: CPT | Performed by: INTERNAL MEDICINE

## 2021-02-08 RX ORDER — ASCORBIC ACID 500 MG
1000 TABLET ORAL DAILY
COMMUNITY

## 2021-02-08 NOTE — PROGRESS NOTES
Chief Complaint   Patient presents with    Thyroid Problem     pcp and pharmacy verified. **THIS IS A VIRTUAL VISIT VIA A TELEPHONE ENCOUNTER. PATIENT AGREED TO HAVE THEIR CARE DELIVERED OVER THE PHONE IN PLACE OF THEIR REGULARLY SCHEDULED OFFICE VISIT**      History of Present Illness: Lily Arnold is a 65 y.o. male here for follow up of subclinical hyperthyroidism and Multinodular goiter. In October 2019, as part of his annual wellness visit he had TFTs drawn. His TSH was 0.050 with a TT4 of 9.7. He was sent for a thyroid Uptake and Scan, which did not show any evidence of hot nodules or hyperactivity, but it did show an area of photopenia in the left lobe. He had repeat TFTs in January 2020 and his TSH was 0.016 with FT4 of 1.69, TT3 of 124 and Thyroid Ab Panel was negative. He was then sent for a thyroid US which showed multiple nodules 4.7cm in the left (the region of photopenia and increased blood flow) and 2.5cm in the right lobe. Pt notes he was first diagnosed with hyperthyroid  \"over 30 years ago\". He has never been treated for hyperthyroidism/subclinical hyperthyroidism. At our initial visit in March 2020 I started pt on MMI 5mg daily. I also sent him for an FNA of the 4.7 cm left nodule and the 2.5 cm right nodule. The pathology on both came back benign. In May 2020. Pt is still taking the MMI 5mg daily. In January 2021 his TSH was up to 2.29 with FT4 of 1.26, TT3 of 113. His TRAB and TSI were negative. Pt denies any illnesses, injuries or hospitalizations since our last visit. \"I am going to the gym to work out every day and other than that I stay at home\". Pt notes that his BPs have \"improved quite a bit my BPs are 120/50s\". He denies issues of CP,SOB, palpitations, tremor, heat or cold intolerance, diarrhea or constipation. He denies issues of dysphagia or dysphonia.     Current Outpatient Medications   Medication Sig    ascorbic acid, vitamin C, (Vitamin C) 500 mg tablet Take 1,000 mg by mouth daily.  methIMAzole (TAPAZOLE) 5 mg tablet TAKE 1 TABLET BY MOUTH DAILY    atorvastatin (LIPITOR) 40 mg tablet TAKE 1 TABLET BY MOUTH EVERY DAY    metoprolol succinate (TOPROL-XL) 25 mg XL tablet TAKE 1 TABLET BY MOUTH DAILY    hydroCHLOROthiazide (HYDRODIURIL) 12.5 mg tablet TAKE 1 TABLET BY MOUTH DAILY    losartan (COZAAR) 50 mg tablet Take 1 Tab by mouth daily.  cholecalciferol, vitamin D3, (VITAMIN D3) 2,000 unit tab Take  by mouth.  omega-3 fatty acids-vitamin e (FISH OIL) 1,000 mg cap Take 1 Cap by mouth two (2) times a day. No current facility-administered medications for this visit. Allergies   Allergen Reactions    Ace Inhibitors Cough    Amlodipine Not Reported This Time    Claritin [Loratadine] Hives    Pcn [Penicillins] Not Reported This Time     Review of Systems:  - Cardiovascular: no chest pain  - Neurological: no tremors  - Integumentary: skin is normal    Physical Examination:  There were no vitals taken for this visit. - None    Data Reviewed:   Component      Latest Ref Rng & Units 1/28/2021 1/28/2021 1/28/2021 1/28/2021           1:36 PM  1:36 PM  1:36 PM  1:36 PM   Thyrotropin Receptor Ab, serum      0.00 - 1.75 IU/L    <1.10   T3, total      71 - 180 ng/dL   113    T4, Free      0.82 - 1.77 ng/dL  1.26     TSH      0.450 - 4.500 uIU/mL 2.290        Component      Latest Ref Rng & Units 1/28/2021           1:36 PM   Thyroid Stim Immunoglobulin      0.00 - 0.55 IU/L <0.10       Assessment/Plan:   1) Subclinical Hyperthyroidism > Pt is clinically euthyroid. His TSH is up to 2.29 with normal T3/T4. His BP is under better control as well. Will have pt decrease his MMI to 5mg every other day and will repeat his TFTs in 3 months. 2) Multinodular Goiter > He had benign FNAs in May 2020. Will repeat his US prior to our next visit. Pt voices understanding and agreement with the plan. RTC 3 months      Copy sent to:     We spent 21 minutes of total time together during this virtual visit with a telephone encounter. All questions were answered and a copy of the instructions were sent to her via [employer]/a letter.

## 2021-02-08 NOTE — PATIENT INSTRUCTIONS
1) Decrease your Methimazole to a 5mg pill every other day. 2) I am enclosing orders for labs and a thyroid ultrasound to be done prior to our follow up visit in May. 3) I will see you back on May 11th at 230PM in my office

## 2021-03-04 DIAGNOSIS — E05.90 SUBCLINICAL HYPERTHYROIDISM: ICD-10-CM

## 2021-03-05 RX ORDER — METHIMAZOLE 5 MG/1
TABLET ORAL
Qty: 45 TAB | Refills: 3 | Status: SHIPPED | OUTPATIENT
Start: 2021-03-05 | End: 2021-03-14

## 2021-03-09 ENCOUNTER — TELEPHONE (OUTPATIENT)
Dept: FAMILY MEDICINE CLINIC | Age: 71
End: 2021-03-09

## 2021-03-09 DIAGNOSIS — I10 ESSENTIAL HYPERTENSION: ICD-10-CM

## 2021-03-09 RX ORDER — HYDROCHLOROTHIAZIDE 12.5 MG/1
TABLET ORAL
Qty: 90 TAB | Refills: 3 | Status: SHIPPED | OUTPATIENT
Start: 2021-03-09 | End: 2021-12-28

## 2021-03-09 RX ORDER — ATORVASTATIN CALCIUM 40 MG/1
TABLET, FILM COATED ORAL
Qty: 90 TAB | Refills: 4 | Status: SHIPPED | OUTPATIENT
Start: 2021-03-09 | End: 2022-03-25

## 2021-03-09 RX ORDER — HYDROCHLOROTHIAZIDE 12.5 MG/1
TABLET ORAL
Qty: 90 TAB | Refills: 3 | Status: SHIPPED | OUTPATIENT
Start: 2021-03-09 | End: 2021-03-09 | Stop reason: SDUPTHER

## 2021-03-09 NOTE — TELEPHONE ENCOUNTER
----- Message from Catia Farmer sent at 3/9/2021  7:24 AM EST -----  Regarding: Michi/Rx  Pt is requesting a Rx for Atorvastatin and Hydrochlorothiazide. Pts number is [phone] and [facility] pharmacy number is [phone].

## 2021-03-09 NOTE — TELEPHONE ENCOUNTER
Requested Prescriptions     Pending Prescriptions Disp Refills    atorvastatin (LIPITOR) 40 mg tablet 90 Tab 4     Sig: TAKE 1 TABLET BY MOUTH EVERY DAY    hydroCHLOROthiazide (HYDRODIURIL) 12.5 mg tablet 90 Tab 3     Sig: TAKE 1 TABLET BY MOUTH DAILY

## 2021-03-14 DIAGNOSIS — E05.90 SUBCLINICAL HYPERTHYROIDISM: ICD-10-CM

## 2021-03-14 RX ORDER — METHIMAZOLE 5 MG/1
TABLET ORAL
Qty: 90 TAB | Refills: 0 | Status: SHIPPED | OUTPATIENT
Start: 2021-03-14 | End: 2021-12-28

## 2021-03-14 NOTE — TELEPHONE ENCOUNTER
30 day refill sent in per  's last office note in his absence.  (different pharmacy than recent mail order rx)    Kelsea Craven MD  [facility]

## 2021-04-26 DIAGNOSIS — E05.90 SUBCLINICAL HYPERTHYROIDISM: ICD-10-CM

## 2021-04-29 ENCOUNTER — HOSPITAL ENCOUNTER (OUTPATIENT)
Dept: LAB | Age: 71
Discharge: HOME | End: 2021-04-29
Payer: MEDICARE

## 2021-04-29 ENCOUNTER — HOSPITAL ENCOUNTER (OUTPATIENT)
Dept: LAB | Age: 71
Discharge: HOME | End: 2021-04-29

## 2021-04-29 PROCEDURE — 84480 ASSAY TRIIODOTHYRONINE (T3): CPT

## 2021-04-29 PROCEDURE — 84443 ASSAY THYROID STIM HORMONE: CPT

## 2021-04-29 PROCEDURE — 84439 ASSAY OF FREE THYROXINE: CPT

## 2021-04-30 LAB
T3 SERPL-MCNC: 107 NG/DL (ref 71–180)
T4 FREE SERPL-MCNC: 1.36 NG/DL (ref 0.82–1.77)
TSH SERPL DL<=0.005 MIU/L-ACNC: 0.93 UIU/ML (ref 0.45–4.5)

## 2021-05-04 ENCOUNTER — HOSPITAL ENCOUNTER (OUTPATIENT)
Dept: ULTRASOUND IMAGING | Age: 71
Discharge: HOME | End: 2021-05-04
Attending: INTERNAL MEDICINE
Payer: MEDICARE

## 2021-05-04 DIAGNOSIS — E04.2 MULTINODULAR GOITER: ICD-10-CM

## 2021-05-04 PROCEDURE — 76536 US EXAM OF HEAD AND NECK: CPT

## 2021-05-11 ENCOUNTER — OFFICE VISIT (OUTPATIENT)
Dept: ENDOCRINOLOGY | Age: 71
End: 2021-05-11
Payer: MEDICARE

## 2021-05-11 VITALS
DIASTOLIC BLOOD PRESSURE: 70 MMHG | RESPIRATION RATE: 14 BRPM | BODY MASS INDEX: 26.78 KG/M2 | HEART RATE: 73 BPM | SYSTOLIC BLOOD PRESSURE: 150 MMHG | WEIGHT: 192 LBS

## 2021-05-11 DIAGNOSIS — E04.2 MULTINODULAR GOITER: ICD-10-CM

## 2021-05-11 DIAGNOSIS — E05.90 HYPERTHYROIDISM: Primary | ICD-10-CM

## 2021-05-11 PROCEDURE — G8754 DIAS BP LESS 90: HCPCS | Performed by: INTERNAL MEDICINE

## 2021-05-11 PROCEDURE — 99214 OFFICE O/P EST MOD 30 MIN: CPT | Performed by: INTERNAL MEDICINE

## 2021-05-11 PROCEDURE — 3017F COLORECTAL CA SCREEN DOC REV: CPT | Performed by: INTERNAL MEDICINE

## 2021-05-11 PROCEDURE — G8419 CALC BMI OUT NRM PARAM NOF/U: HCPCS | Performed by: INTERNAL MEDICINE

## 2021-05-11 PROCEDURE — G8536 NO DOC ELDER MAL SCRN: HCPCS | Performed by: INTERNAL MEDICINE

## 2021-05-11 PROCEDURE — G8753 SYS BP > OR = 140: HCPCS | Performed by: INTERNAL MEDICINE

## 2021-05-11 PROCEDURE — G8427 DOCREV CUR MEDS BY ELIG CLIN: HCPCS | Performed by: INTERNAL MEDICINE

## 2021-05-11 PROCEDURE — G8432 DEP SCR NOT DOC, RNG: HCPCS | Performed by: INTERNAL MEDICINE

## 2021-05-11 PROCEDURE — 1101F PT FALLS ASSESS-DOCD LE1/YR: CPT | Performed by: INTERNAL MEDICINE

## 2021-05-11 NOTE — PROGRESS NOTES
Chief Complaint   Patient presents with    Thyroid Problem     pcp and pharmacy verified. IN PERSON     History of Present Illness: Rakel Liao is a 65 y.o. male here for follow up of subclinical hyperthyroidism and Multinodular goiter. In October 2019, as part of his annual wellness visit he had TFTs drawn. His TSH was 0.050 with a TT4 of 9.7. He was sent for a thyroid Uptake and Scan, which did not show any evidence of hot nodules or hyperactivity, but it did show an area of photopenia in the left lobe. He had repeat TFTs in January 2020 and his TSH was 0.016 with FT4 of 1.69, TT3 of 124 and Thyroid Ab Panel was negative. He was then sent for a thyroid US which showed multiple nodules 4.7cm in the left (the region of photopenia and increased blood flow) and 2.5cm in the right lobe. Pt notes he was first diagnosed with hyperthyroid  \"over 30 years ago\". He has never been treated for hyperthyroidism/subclinical hyperthyroidism. At our initial visit in March 2020 I started pt on MMI 5mg daily. I also sent him for an FNA of the 4.7 cm left nodule and the 2.5 cm right nodule. The pathology on both came back benign. In May 2020. Pt denies any recent illnesses, injuries or hospitalizations. Last week his TSH last week was 0.931 with FT4 of 1.36 and TT3 of 107. Pt is still taking the MMI 5mg every other day. (\"I take it on ever odd day\"). \"I am going to the gym to work out every day and other than that I stay at home\". He denies issues of CP, SOB, palpitations (\"my resting HR is in the 50's). He denies issues of heat or cold intolerance, diarrhea or constipation. He denies issues of dysphagia or dysphonia.         Current Outpatient Medications   Medication Sig    methIMAzole (TAPAZOLE) 5 mg tablet TAKE 1 TABLET BY MOUTH DAILY (Patient taking differently: TAKE 1 TABLET BY MOUTH EVERY OTHER DAY)    atorvastatin (LIPITOR) 40 mg tablet TAKE 1 TABLET BY MOUTH EVERY DAY    hydroCHLOROthiazide (HYDRODIURIL) 12.5 mg tablet TAKE 1 TABLET BY MOUTH DAILY    metoprolol succinate (TOPROL-XL) 25 mg XL tablet Take 1 Tab by mouth daily.  losartan (COZAAR) 50 mg tablet Take 1 Tab by mouth daily.  ascorbic acid, vitamin C, (Vitamin C) 500 mg tablet Take 1,000 mg by mouth daily.  cholecalciferol, vitamin D3, (VITAMIN D3) 2,000 unit tab Take  by mouth.  omega-3 fatty acids-vitamin e (FISH OIL) 1,000 mg cap Take 2 Caps by mouth daily. No current facility-administered medications for this visit. Allergies   Allergen Reactions    Ace Inhibitors Cough    Amlodipine Not Reported This Time    Claritin [Loratadine] Hives    Pcn [Penicillins] Not Reported This Time     Review of Systems:  - Cardiovascular: no chest pain  - Neurological: no tremors  - Integumentary: skin is normal    Physical Examination:  Blood pressure (!) 150/70, pulse 73, resp. rate 14, weight 192 lb (87.1 kg). - General: pleasant, no distress, good eye contact   - Neck: supple, + thyroid nodules palpated in the left and right lobes, no LAD, no thyroid bruits, or carotid bruits, no supraclavicular or dorsocervical fat pads  - Cardiovascular: regular, normal rate, nl s1 and s2, no m/r/g   - Integumentary: skin is normal, no edema  - Neurological: reflexes 2+ at biceps, no tremors  - Psychiatric: normal mood and affect    Data Reviewed:   Component      Latest Ref Rng & Units 4/29/2021 4/29/2021 4/29/2021          12:00 AM 12:00 AM 12:00 AM   TSH      0.450 - 4.500 uIU/mL   0.931   T4, Free      0.82 - 1.77 ng/dL  1.36    T3, total      71 - 180 ng/dL 107       EXAM: US THYROID/PARATHYROID/SOFT TISS      INDICATION: Multinodular goiter. Prior biopsy of the left thyroid nodule and one  of the right thyroid nodules yielded benign results.     COMPARISON: 1/21/2020.     TECHNIQUE: Real-time sonography of the thyroid gland was performed with a high  frequency linear transducer.  Multiple static images were obtained.     FINDINGS:     Within the right mid thyroid, there is an upper pole 1 x 1 x 0.6 cm hypoechoic  heterogeneous avascular nodule and an inferior pole 3 x 1.6 x 2.7 cm  heterogeneous isoechoic nodule. Allowing for differences in measuring technique,  there has been no change in these nodules. , In the inferior aspect of the left  thyroid, there is a 5.0 x 2.8 x 4.9 cm heterogeneous nodule with superficial  vascularity, that previously measured 4.7 x 2.9 x 5.1 cm. . There are no  calcifications. There is no lymphadenopathy.     The right lobe measures 7.2 x 2.2 x 2.6 cm and the left lobe measures 8.7 x 4.4  x 4.0 cm cm. The isthmus measures 1.5 cm. The thyroid is stable in size.     IMPRESSION  Multinodular goiter with slight increase in size of the left mid thyroid nodule     Assessment/Plan:   1) Subclinical Hyperthyroidism > Pt is clinically euthyroid. His TSH last week was 0.931 with FT4 of 1.36 and TT3 of 107. Pt to continue the MMI 5mg every other day. 2) Multinodular Goiter > He had benign FNAs in May 2020. His repeat Thyroid US in May 2021 did not show any clinically significant changes. No need for repeat FNA at this time. Will repeat the [address] [facility] in May 2022. Pt voices understanding and agreement with the plan. RTC 6 months  Follow-up and Dispositions    · Return in about 6 months (around 11/11/2021).          Copy sent to:  Dr. Ligia Kim

## 2021-09-28 ENCOUNTER — OFFICE VISIT (OUTPATIENT)
Dept: FAMILY MEDICINE CLINIC | Age: 71
End: 2021-09-28
Payer: MEDICARE

## 2021-09-28 DIAGNOSIS — Z23 NEEDS FLU SHOT: Primary | ICD-10-CM

## 2021-09-28 PROCEDURE — 90694 VACC AIIV4 NO PRSRV 0.5ML IM: CPT | Performed by: FAMILY MEDICINE

## 2021-09-28 PROCEDURE — G0008 ADMIN INFLUENZA VIRUS VAC: HCPCS | Performed by: FAMILY MEDICINE

## 2021-09-28 NOTE — PROGRESS NOTES
After obtaining consent, and per orders of Dr. Tiburcio Guerrero, injection of Fluad given by Patricia Sumner. Patient instructed to remain in clinic for 20 minutes afterwards, and to report any adverse reaction to me immediately.

## 2021-09-28 NOTE — PATIENT INSTRUCTIONS
Vaccine Information Statement    Influenza (Flu) Vaccine (Inactivated or Recombinant): What You Need to Know    Many vaccine information statements are available in [place] and other languages. See www.immunize.org/vis. Hojas de información sobre vacunas están disponibles en español y en muchos otros idiomas. Visite www.immunize.org/vis. 1. Why get vaccinated? Influenza vaccine can prevent influenza (flu). Flu is a contagious disease that spreads around the [facility] every year, usually between October and May. Anyone can get the flu, but it is more dangerous for some people. Infants and young children, people 72 years and older, pregnant people, and people with certain health conditions or a weakened immune system are at greatest risk of flu complications. Pneumonia, bronchitis, sinus infections, and ear infections are examples of flu-related complications. If you have a medical condition, such as heart disease, cancer, or diabetes, flu can make it worse. Flu can cause fever and chills, sore throat, muscle aches, fatigue, cough, headache, and runny or stuffy nose. Some people may have vomiting and diarrhea, though this is more common in children than adults. In an average year, thousands of people in the [facility] die from flu, and many more are hospitalized. Flu vaccine prevents millions of illnesses and flu-related visits to the doctor each year. 2. Influenza vaccines     CDC recommends everyone 6 months and older get vaccinated every flu season. Children 6 months through 6years of age may need 2 doses during a single flu season. Everyone else needs only 1 dose each flu season. It takes about 2 weeks for protection to develop after vaccination. There are many flu viruses, and they are always changing. Each year a new flu vaccine is made to protect against the influenza viruses believed to be likely to cause disease in the upcoming flu season.  Even when the vaccine doesnt exactly match these viruses, it may still provide some protection. Influenza vaccine does not cause flu. Influenza vaccine may be given at the same time as other vaccines. 3. Talk with your health care provider    Tell your vaccination provider if the person getting the vaccine:   Has had an allergic reaction after a previous dose of influenza vaccine, or has any severe, life-threatening allergies    Has ever had Guillain-Barré Syndrome (also called GBS)    In some cases, your health care provider may decide to postpone influenza vaccination until a future visit. Influenza vaccine can be administered at any time during pregnancy. People who are or will be pregnant during influenza season should receive inactivated influenza vaccine. People with minor illnesses, such as a cold, may be vaccinated. People who are moderately or severely ill should usually wait until they recover before getting influenza vaccine. Your health care provider can give you more information. 4. Risks of a vaccine reaction     Soreness, redness, and swelling where the shot is given, fever, muscle aches, and headache can happen after influenza vaccination.  There may be a very small increased risk of Guillain-Barré Syndrome (GBS) after inactivated influenza vaccine (the flu shot). [facility] children who get the flu shot along with pneumococcal vaccine (PCV13) and/or DTaP vaccine at the same time might be slightly more likely to have a seizure caused by fever. Tell your health care provider if a child who is getting flu vaccine has ever had a seizure. People sometimes faint after medical procedures, including vaccination. Tell your provider if you feel dizzy or have vision changes or ringing in the ears. As with any medicine, there is a very remote chance of a vaccine causing a severe allergic reaction, other serious injury, or death. 5. What if there is a serious problem?     An allergic reaction could occur after the vaccinated person leaves the clinic. If you see signs of a severe allergic reaction (hives, swelling of the face and throat, difficulty breathing, a fast heartbeat, dizziness, or weakness), call 9-1-1 and get the person to the nearest hospital.    For other signs that concern you, call your health care provider. Adverse reactions should be reported to the Vaccine Adverse Event Reporting System (VAERS). Your health care provider will usually file this report, or you can do it yourself. Visit the VAERS website at www.vaers. [facility].gov or call 4-950.319.5142. VAERS is only for reporting reactions, and VAERS staff members do not give medical advice. 6. The National Vaccine Injury Compensation Program    The [facility] Vaccine Injury Compensation Program (VICP) is a federal program that was created to compensate people who may have been injured by certain vaccines. Claims regarding alleged injury or death due to vaccination have a time limit for filing, which may be as short as two years. Visit the VICP website at www.[facility].gov/vaccinecompensation or call 9-641.784.1303 to learn about the program and about filing a claim. 7. How can I learn more?  Ask your health care provider.  Call your local or state health department.  Visit the website of the Food and Drug Administration (FDA) for vaccine package inserts and additional information at www.fda.gov/vaccines-blood-biologics/vaccines.  Contact the Centers for Disease Control and Prevention (CDC):  - Call 0-877.548.9845 (7-822-NMZ-INFO) or  - Visit CDCs influenza website at www.cdc.gov/flu. Vaccine Information Statement   Inactivated Influenza Vaccine   8/6/2021  [address] [place] [id number]   Department of Health and Human Services  Centers for Disease Control and Prevention    Office Use Only

## 2021-10-11 ENCOUNTER — OFFICE VISIT (OUTPATIENT)
Dept: FAMILY MEDICINE CLINIC | Age: 71
End: 2021-10-11
Payer: MEDICARE

## 2021-10-11 VITALS
TEMPERATURE: 97.8 F | DIASTOLIC BLOOD PRESSURE: 89 MMHG | HEART RATE: 79 BPM | HEIGHT: 71 IN | OXYGEN SATURATION: 97 % | SYSTOLIC BLOOD PRESSURE: 154 MMHG | BODY MASS INDEX: 26.71 KG/M2 | WEIGHT: 190.8 LBS | RESPIRATION RATE: 17 BRPM

## 2021-10-11 DIAGNOSIS — L72.9 CYST OF SKIN: Primary | ICD-10-CM

## 2021-10-11 PROCEDURE — G8427 DOCREV CUR MEDS BY ELIG CLIN: HCPCS | Performed by: INTERNAL MEDICINE

## 2021-10-11 PROCEDURE — 1101F PT FALLS ASSESS-DOCD LE1/YR: CPT | Performed by: INTERNAL MEDICINE

## 2021-10-11 PROCEDURE — G8753 SYS BP > OR = 140: HCPCS | Performed by: INTERNAL MEDICINE

## 2021-10-11 PROCEDURE — G8536 NO DOC ELDER MAL SCRN: HCPCS | Performed by: INTERNAL MEDICINE

## 2021-10-11 PROCEDURE — G8510 SCR DEP NEG, NO PLAN REQD: HCPCS | Performed by: INTERNAL MEDICINE

## 2021-10-11 PROCEDURE — 3017F COLORECTAL CA SCREEN DOC REV: CPT | Performed by: INTERNAL MEDICINE

## 2021-10-11 PROCEDURE — G8419 CALC BMI OUT NRM PARAM NOF/U: HCPCS | Performed by: INTERNAL MEDICINE

## 2021-10-11 PROCEDURE — G8754 DIAS BP LESS 90: HCPCS | Performed by: INTERNAL MEDICINE

## 2021-10-11 PROCEDURE — 99213 OFFICE O/P EST LOW 20 MIN: CPT | Performed by: INTERNAL MEDICINE

## 2021-10-11 NOTE — PROGRESS NOTES
Chief Complaint   Patient presents with    Skin Problem     C/O bump to his upper back. Reports showing up this pat week. ASSESSMENT AND PLAN:    1. Cyst of skin  This is a small blocked pore which was drained today. No sign of infection. Apply warm compresses. If this recurs, we can definitely excise this. No orders of the defined types were placed in this encounter. Lawrence Rodriguez MD, FACP      HPI:        Devi Morales is a 66 y.o. male who notes the onset of a skin problem. The details are as follows: Sore area on the mid back of uncertain duration. Allergies   Allergen Reactions    Ace Inhibitors Cough    Amlodipine Not Reported This Time    Claritin [Loratadine] Hives    Pcn [Penicillins] Not Reported This Time       Current Outpatient Medications   Medication Sig    losartan (COZAAR) 50 mg tablet TAKE 1 TABLET BY MOUTH DAILY; REPLACES VALSARTAN AND IRBESARTAN    methIMAzole (TAPAZOLE) 5 mg tablet TAKE 1 TABLET BY MOUTH DAILY (Patient taking differently: TAKE 1 TABLET BY MOUTH EVERY OTHER DAY)    atorvastatin (LIPITOR) 40 mg tablet TAKE 1 TABLET BY MOUTH EVERY DAY    hydroCHLOROthiazide (HYDRODIURIL) 12.5 mg tablet TAKE 1 TABLET BY MOUTH DAILY    metoprolol succinate (TOPROL-XL) 25 mg XL tablet Take 1 Tab by mouth daily.  ascorbic acid, vitamin C, (Vitamin C) 500 mg tablet Take 1,000 mg by mouth daily.  cholecalciferol, vitamin D3, (VITAMIN D3) 2,000 unit tab Take  by mouth.  omega-3 fatty acids-vitamin e (FISH OIL) 1,000 mg cap Take 2 Caps by mouth daily. No current facility-administered medications for this visit.        Past Medical History:   Diagnosis Date    Conductive hearing loss, middle ear     [facility]2016    resolved after weight loss    Elevated liver enzymes     HTN (hypertension)     resolved after weight loss 08/18    Hyperlipidemia     Subclinical hyperthyroidism     Tinnitus          ROS:  Denies fever, chills, cough, chest pain, SOB,  nausea, vomiting, or diarrhea. Denies wt loss, wt gain, hemoptysis, hematochezia or melena. Physical Examination:    Visit Vitals  BP (!) 154/89 (BP 1 Location: Left upper arm, BP Patient Position: Sitting, BP Cuff Size: Adult long)   Pulse 79   Temp 97.8 °F (36.6 °C) (Temporal)   Resp 17   Ht 5' 11\" (1.803 m)   Wt 190 lb 12.8 oz (86.5 kg)   SpO2 97%   BMI 26.61 kg/m²      General:  Alert, cooperative, no distress. Head:  Normocephalic, without obvious abnormality, atraumatic. Eyes:  Conjunctivae/corneas clear. Pupils equal, round, reactive to light. Chest wall:  No tenderness or deformity. Extremities: Extremities normal, atraumatic, no cyanosis or edema.    Skin:  4 mm cyst   Lymph nodes: Cervical and supraclavicular nodes are normal.   Neurologic: Moves all extremities, fluent speech

## 2021-10-11 NOTE — PROGRESS NOTES
Burr Cogan is a 66 y.o. male presenting for/with:    Chief Complaint   Patient presents with    Skin Problem     C/O bump to his upper back. Reports showing up this pat week. Visit Vitals  BP (!) 154/89 (BP 1 Location: Left upper arm, BP Patient Position: Sitting, BP Cuff Size: Adult long)   Pulse 79   Temp 97.8 °F (36.6 °C) (Temporal)   Resp 17   Ht 5' 11\" (1.803 m)   Wt 190 lb 12.8 oz (86.5 kg)   SpO2 97%   BMI 26.61 kg/m²     Pain Scale: 0 - No pain/10  Pain Location:     1. Have you been to the ER, urgent care clinic since your last visit? Hospitalized since your last visit? NO    2. Have you seen or consulted any other health care providers outside of the [address] [place] [address] since your last visit? Include any pap smears or colon screening. NO    Symptom review:  NO  Fever   NO  Shaking chills  NO  Cough  NO  Body aches  NO  Coughing up blood  NO  Chest congestion  NO  Chest pain  NO  Shortness of breath  NO  Profound Loss of smell/taste  NO  Nausea/Vomiting   NO  Loose stool/Diarrhea  NO  any skin issues    Patient Risk Factors Reviewed as follows:  NO  have you been in Close contact with confirmed COVID19 patient   NO  History of recent travel to affected geographical areas within the past 14 days  NO  COPD  NO  Active Cancer/Leukemia/Lymphoma/Chemotherapy  NO  Oral steroid use  NO  Pregnant  NO  Diabetes Mellitus  YES  Heart disease  NO  Asthma  NO Health care worker at home  [address] care worker  NO Is there a Pregnant Woman in the home  NO Dialysis pt in the home   NO a large number of people living in the home    Learning Assessment 10/11/2021   PRIMARY LEARNER Patient   PRIMARY LANGUAGE ENGLISH   LEARNER PREFERENCE PRIMARY PICTURES   ANSWERED BY patient   RELATIONSHIP SELF     Fall Risk Assessment, last 12 mths 10/11/2021   Able to walk? Yes   Fall in past 12 months? 0   Do you feel unsteady?  0   Are you worried about falling 0       3 most recent PHQ Screens 10/11/2021   Little interest or pleasure in doing things Not at all   Feeling down, depressed, irritable, or hopeless Not at all   Total Score PHQ 2 0     Abuse Screening Questionnaire 10/11/2021   Do you ever feel afraid of your partner? N   Are you in a relationship with someone who physically or mentally threatens you? N   Is it safe for you to go home?  Y       ADL Assessment 10/11/2021   Feeding yourself No Help Needed   Getting from bed to chair No Help Needed   Getting dressed No Help Needed   Bathing or showering No Help Needed   Walk across the room (includes cane/walker) No Help Needed   Using the telphone No Help Needed   Taking your medications No Help Needed   Preparing meals No Help Needed   Managing money (expenses/bills) No Help Needed   Moderately strenuous housework (laundry) No Help Needed   Shopping for personal items (toiletries/medicines) No Help Needed   Shopping for groceries No Help Needed   Driving No Help Needed   Climbing a flight of stairs No Help Needed   Getting to places beyond walking distances No Help Needed      No advance directive on file

## 2021-10-25 ENCOUNTER — HOSPITAL ENCOUNTER (OUTPATIENT)
Dept: LAB | Age: 71
Discharge: HOME | End: 2021-10-25

## 2021-10-26 LAB
ALBUMIN SERPL-MCNC: 4.5 G/DL (ref 3.7–4.7)
ALBUMIN/GLOB SERPL: 2.4 {RATIO} (ref 1.2–2.2)
ALP SERPL-CCNC: 53 IU/L (ref 44–121)
ALT SERPL-CCNC: 24 IU/L (ref 0–44)
AST SERPL-CCNC: 28 IU/L (ref 0–40)
BILIRUB SERPL-MCNC: 1 MG/DL (ref 0–1.2)
BUN SERPL-MCNC: 14 MG/DL (ref 8–27)
BUN/CREAT SERPL: 14 (ref 10–24)
CALCIUM SERPL-MCNC: 9.3 MG/DL (ref 8.6–10.2)
CHLORIDE SERPL-SCNC: 105 MMOL/L (ref 96–106)
CO2 SERPL-SCNC: 26 MMOL/L (ref 20–29)
CREAT SERPL-MCNC: 1.02 MG/DL (ref 0.76–1.27)
ERYTHROCYTE [DISTWIDTH] IN BLOOD BY AUTOMATED COUNT: 12.8 % (ref 11.6–15.4)
GLOBULIN SER CALC-MCNC: 1.9 G/DL (ref 1.5–4.5)
GLUCOSE SERPL-MCNC: 113 MG/DL (ref 65–99)
HCT VFR BLD AUTO: 41.6 % (ref 37.5–51)
HGB BLD-MCNC: 14.1 G/DL (ref 13–17.7)
MCH RBC QN AUTO: 29 PG (ref 26.6–33)
MCHC RBC AUTO-ENTMCNC: 33.9 G/DL (ref 31.5–35.7)
MCV RBC AUTO: 86 FL (ref 79–97)
PLATELET # BLD AUTO: 205 X10E3/UL (ref 150–450)
POTASSIUM SERPL-SCNC: 4.3 MMOL/L (ref 3.5–5.2)
PROT SERPL-MCNC: 6.4 G/DL (ref 6–8.5)
RBC # BLD AUTO: 4.86 X10E6/UL (ref 4.14–5.8)
SODIUM SERPL-SCNC: 142 MMOL/L (ref 134–144)
T3 SERPL-MCNC: 108 NG/DL (ref 71–180)
T4 FREE SERPL-MCNC: 1.23 NG/DL (ref 0.82–1.77)
TSH SERPL DL<=0.005 MIU/L-ACNC: 1.01 UIU/ML (ref 0.45–4.5)
WBC # BLD AUTO: 6.6 X10E3/UL (ref 3.4–10.8)

## 2021-11-01 DIAGNOSIS — E05.90 HYPERTHYROIDISM: ICD-10-CM

## 2021-11-02 ENCOUNTER — OFFICE VISIT (OUTPATIENT)
Dept: ENDOCRINOLOGY | Age: 71
End: 2021-11-02
Payer: MEDICARE

## 2021-11-02 VITALS
BODY MASS INDEX: 26.6 KG/M2 | DIASTOLIC BLOOD PRESSURE: 81 MMHG | SYSTOLIC BLOOD PRESSURE: 157 MMHG | WEIGHT: 190 LBS | HEART RATE: 65 BPM | HEIGHT: 71 IN

## 2021-11-02 DIAGNOSIS — E05.90 HYPERTHYROIDISM: Primary | ICD-10-CM

## 2021-11-02 DIAGNOSIS — E04.2 MULTINODULAR GOITER: ICD-10-CM

## 2021-11-02 PROCEDURE — G8536 NO DOC ELDER MAL SCRN: HCPCS | Performed by: INTERNAL MEDICINE

## 2021-11-02 PROCEDURE — G8419 CALC BMI OUT NRM PARAM NOF/U: HCPCS | Performed by: INTERNAL MEDICINE

## 2021-11-02 PROCEDURE — G8432 DEP SCR NOT DOC, RNG: HCPCS | Performed by: INTERNAL MEDICINE

## 2021-11-02 PROCEDURE — 1101F PT FALLS ASSESS-DOCD LE1/YR: CPT | Performed by: INTERNAL MEDICINE

## 2021-11-02 PROCEDURE — G8754 DIAS BP LESS 90: HCPCS | Performed by: INTERNAL MEDICINE

## 2021-11-02 PROCEDURE — 3017F COLORECTAL CA SCREEN DOC REV: CPT | Performed by: INTERNAL MEDICINE

## 2021-11-02 PROCEDURE — G8753 SYS BP > OR = 140: HCPCS | Performed by: INTERNAL MEDICINE

## 2021-11-02 PROCEDURE — G8427 DOCREV CUR MEDS BY ELIG CLIN: HCPCS | Performed by: INTERNAL MEDICINE

## 2021-11-02 PROCEDURE — 99214 OFFICE O/P EST MOD 30 MIN: CPT | Performed by: INTERNAL MEDICINE

## 2021-11-02 NOTE — PROGRESS NOTES
Chief Complaint   Patient presents with    Thyroid Problem     pcp and pharmacy verified     History of Present Illness: Bautista Oliver is a 66 y.o. male here for follow up of subclinical hyperthyroidism and Multinodular goiter. In October 2019, as part of his annual wellness visit he had TFTs drawn. His TSH was 0.050 with a TT4 of 9.7. He was sent for a thyroid Uptake and Scan, which did not show any evidence of hot nodules or hyperactivity, but it did show an area of photopenia in the left lobe. He had repeat TFTs in January 2020 and his TSH was 0.016 with FT4 of 1.69, TT3 of 124 and Thyroid Ab Panel was negative. He was then sent for a thyroid US which showed multiple nodules 4.7cm in the left (the region of photopenia and increased blood flow) and 2.5cm in the right lobe. Pt notes he was first diagnosed with hyperthyroid  \"over 30 years ago\". He has never been treated for hyperthyroidism/subclinical hyperthyroidism. At our initial visit in March 2020 I started pt on MMI 5mg daily. I also sent him for an FNA of the 4.7 cm left nodule and the 2.5 cm right nodule. The pathology on both came back benign. In May 2020. Pt received J&J vaccine and he will get the booster next week. Pt denies any recent illnesses, injuries or hospitalizations. His TSH last week was 1.01 with FT4 of 1.23 and TT3    Pt is still taking the MMI 5mg every other day. (\"I take it on ever odd day\"). \"I am going to the gym to work out every day and other than that I stay at home\". He denies issues of CP, SOB, palpitations (\"my resting HR is in the 50's). He denies issues of heat or cold intolerance, diarrhea or constipation. He denies issues of dysphagia or dysphonia. He denies any changes in his neck. He denies any cervical swelling or LAD.         Current Outpatient Medications   Medication Sig    losartan (COZAAR) 50 mg tablet TAKE 1 TABLET BY MOUTH DAILY; REPLACES VALSARTAN AND IRBESARTAN    methIMAzole (TAPAZOLE) 5 mg tablet TAKE 1 TABLET BY MOUTH DAILY (Patient taking differently: TAKE 1 TABLET BY MOUTH EVERY OTHER DAY)    atorvastatin (LIPITOR) 40 mg tablet TAKE 1 TABLET BY MOUTH EVERY DAY    hydroCHLOROthiazide (HYDRODIURIL) 12.5 mg tablet TAKE 1 TABLET BY MOUTH DAILY    metoprolol succinate (TOPROL-XL) 25 mg XL tablet Take 1 Tab by mouth daily.  ascorbic acid, vitamin C, (Vitamin C) 500 mg tablet Take 1,000 mg by mouth daily.  cholecalciferol, vitamin D3, (VITAMIN D3) 2,000 unit tab Take  by mouth.  omega-3 fatty acids-vitamin e (FISH OIL) 1,000 mg cap Take 1 Capsule by mouth daily. No current facility-administered medications for this visit. Allergies   Allergen Reactions    Ace Inhibitors Cough    Amlodipine Not Reported This Time    Claritin [Loratadine] Hives    Pcn [Penicillins] Not Reported This Time     Review of Systems:  - Cardiovascular: no chest pain  - Neurological: no tremors  - Integumentary: skin is normal    Physical Examination:  Blood pressure (!) 157/81, pulse 65, height 5' 11\" (1.803 m), weight 190 lb (86.2 kg).   - General: pleasant, no distress, good eye contact   - Neck: supple, + thyroid nodules palpated in the left and right lobes, no LAD, no thyroid bruits, or carotid bruits, no supraclavicular or dorsocervical fat pads  - Cardiovascular: regular, normal rate, nl s1 and s2, no m/r/g   - Integumentary: skin is normal, no edema  - Neurological: reflexes 2+ at biceps, no tremors  - Psychiatric: normal mood and affect    Data Reviewed:   Component      Latest Ref Rng & Units 10/25/2021   Glucose      65 - 99 mg/dL 113 (H)   BUN      8 - 27 mg/dL 14   Creatinine      0.76 - 1.27 mg/dL 1.02   GFR est non-AA      >59 mL/min/1.73 73   GFR est AA      >59 mL/min/1.73 84   BUN/Creatinine ratio      10 - 24 14   Sodium      134 - 144 mmol/L 142   Potassium      3.5 - 5.2 mmol/L 4.3   Chloride      96 - 106 mmol/L 105   CO2      20 - 29 mmol/L 26   Calcium      8.6 - 10.2 mg/dL 9.3   Protein, total      6.0 - 8.5 g/dL 6.4   Albumin      3.7 - 4.7 g/dL 4.5   GLOBULIN, TOTAL      1.5 - 4.5 g/dL 1.9   A-G Ratio      1.2 - 2.2 2.4 (H)   Bilirubin, total      0.0 - 1.2 mg/dL 1.0   Alk. phosphatase      44 - 121 IU/L 53   AST      0 - 40 IU/L 28   ALT      0 - 44 IU/L 24   WBC      3.4 - 10.8 x10E3/uL 6.6   RBC      4.14 - 5.80 x10E6/uL 4.86   HGB      13.0 - 17.7 g/dL 14.1   HCT      37.5 - 51.0 % 41.6   MCV      79 - 97 fL 86   MCH      26.6 - 33.0 pg 29.0   MCHC      31.5 - 35.7 g/dL 33.9   RDW      11.6 - 15.4 % 12.8   PLATELET      242 - 223 x10E3/uL 205   TSH      0.450 - 4.500 uIU/mL 1.010   T4, Free      0.82 - 1.77 ng/dL 1.23   T3, total      71 - 180 ng/dL 108     Assessment/Plan:   1) Subclinical Hyperthyroidism > Pt is clinically euthyroid. His TSH last week was 1.01 with FT4 of 1.23 and TT3 of 108. His CBC and CMP were unremarkable. Pt to decrease his MMI to 2.5mg every other day. Will repeat his TFTs in 6 weeks. 2) Multinodular Goiter > He had benign FNAs in May 2020. His repeat Thyroid US in May 2021 did not show any clinically significant changes. Will repeat the [address] in May 2022. Pt voices understanding and agreement with the plan. RTC 6 months  Follow-up and Dispositions    · Return in about 6 months (around 5/2/2022).          Copy sent to:  Dr. Dalila Montgomery

## 2021-12-13 ENCOUNTER — HOSPITAL ENCOUNTER (OUTPATIENT)
Dept: LAB | Age: 71
Discharge: HOME | End: 2021-12-13

## 2021-12-14 ENCOUNTER — PATIENT MESSAGE (OUTPATIENT)
Dept: ENDOCRINOLOGY | Age: 71
End: 2021-12-14

## 2021-12-14 DIAGNOSIS — E05.90 HYPERTHYROIDISM: ICD-10-CM

## 2021-12-14 LAB
T3 SERPL-MCNC: 116 NG/DL (ref 71–180)
T4 FREE SERPL-MCNC: 1.47 NG/DL (ref 0.82–1.77)
TSH SERPL DL<=0.005 MIU/L-ACNC: 0.65 UIU/ML (ref 0.45–4.5)

## 2021-12-27 DIAGNOSIS — E05.90 SUBCLINICAL HYPERTHYROIDISM: ICD-10-CM

## 2021-12-28 RX ORDER — METHIMAZOLE 5 MG/1
TABLET ORAL
Qty: 45 TABLET | Refills: 0 | Status: SHIPPED | OUTPATIENT
Start: 2021-12-28 | End: 2022-03-23

## 2022-03-17 NOTE — PROGRESS NOTES
Mr. Barbie Mcknight is a 66 y.o. male who is here for evaluation of   Chief Complaint   Patient presents with    Annual Wellness Visit    Thyroid Problem     is followed by Dr Roseanne Kay. Next appt with him on 5/3/22    Immunization/Injection     Vaccines updated    Referral Follow Up     Last eye exam completed with Dr Kandy Selby - Letter sent requesting records    Hypertension     States BP always goes up at the doctors office. Has log from home testing. Average 114-135 / 56-66. Reports lowest BP is after 5 times of checking   . ASSESSMENT AND PLAN:    1. Medicare annual wellness visit, subsequent  2. Type 2 diabetes mellitus without complication, without long-term current use of insulin ([facility])  Normal foot exam.  Due for A1c.  3. Essential hypertension  Blood pressure at home is actually quite good. Readings reviewed today in clinic and they are all between 120 and 130  4. Subclinical hyperthyroidism  Followed by endocrinology and remains on a very small dose of methimazole. Labs today. Watch for agranulocytosis.       Orders Placed This Encounter    HEMOGLOBIN A1C WITH EAG     Standing Status:   Future     Number of Occurrences:   1     Standing Expiration Date:   4/8/2022    LIPID PANEL     Standing Status:   Future     Number of Occurrences:   1     Standing Expiration Date:   3/7/2649    METABOLIC PANEL, COMPREHENSIVE     Standing Status:   Future     Number of Occurrences:   1     Standing Expiration Date:   4/8/2022    CBC WITH AUTOMATED DIFF     Standing Status:   Future     Number of Occurrences:   1     Standing Expiration Date:   4/8/2022    TSH 3RD GENERATION     Standing Status:   Future     Number of Occurrences:   1     Standing Expiration Date:   4/8/2022    T4 (THYROXINE)     Standing Status:   Future     Number of Occurrences:   1     Standing Expiration Date:   4/8/2022    T4, FREE     Standing Status:   Future     Number of Occurrences:   1     Standing Expiration Date:   4/8/2022    AMB POC URINALYSIS DIP STICK MANUAL W/ MICRO     Standing Status:   Future     Number of Occurrences:   1     Standing Expiration Date:   4/8/2022     DIABETES FOOT EXAM    vit A/vit C/vit E/zinc/copper (PRESERVISION AREDS PO)     Sig: Take  by mouth. HPI 73-year-old gentleman who arrives for subsequent annual wellness visit and interval assessment of diabetes, hypertension and hyperthyroidism. Under the care of Dr. Nadeem La for his thyroid status. ROS:  Denies  fever, chills, cough, chest pain, SOB,  nausea, vomiting, or diarrhea. Denies wt loss, wt gain, hemoptysis, hematochezia or melena. Physical Examination:    Visit Vitals  BP (!) 154/82 (BP 1 Location: Left upper arm, BP Patient Position: Sitting, BP Cuff Size: Adult long)   Pulse 62   Temp 98.2 °F (36.8 °C) (Temporal)   Resp 17   Ht 5' 11\" (1.803 m)   Wt 187 lb (84.8 kg)   SpO2 98%   BMI 26.08 kg/m²      General:  Alert, cooperative, no distress. Head:  Normocephalic, without obvious abnormality, atraumatic. Eyes:  Conjunctivae/corneas clear. Pupils equal, round, reactive to light. Extraocular movements intact. Lungs:   Clear to auscultation bilaterally. Chest wall:  No tenderness or deformity. Cardiac:  RRR without MGR. Abdomen:   Soft, non-tender. Bowel sounds normal. No masses. No organomegaly. Extremities: Extremities normal, atraumatic, no cyanosis or edema. Pulses: 2+ and symmetric all extremities. Skin: Skin color, texture, turgor normal. No rashes or lesions. Lymph nodes: Cervical, supraclavicular, and axillary nodes normal.   Neurologic: Fluent, normal gait, moves all extremities  Diabetic Foot Exam:  Both feet are examined and demonstrate intact DP pulses. There is good capillary refill and sensation is intact. Skin is intact and there are no ulcers or sores.          On this date 03/24/2022 I have spent 30 minutes reviewing previous notes, test results and face to face with the patient discussing the diagnosis and importance of compliance with the treatment plan as well as documenting on the day of the visit.     Lenny Alvarado MD FACP    (signed electronically) on 3/24/2022 at 5:33 PM

## 2022-03-24 ENCOUNTER — OFFICE VISIT (OUTPATIENT)
Dept: FAMILY MEDICINE CLINIC | Age: 72
End: 2022-03-24
Payer: MEDICARE

## 2022-03-24 VITALS
OXYGEN SATURATION: 98 % | HEIGHT: 71 IN | WEIGHT: 187 LBS | DIASTOLIC BLOOD PRESSURE: 82 MMHG | SYSTOLIC BLOOD PRESSURE: 154 MMHG | BODY MASS INDEX: 26.18 KG/M2 | TEMPERATURE: 98.2 F | RESPIRATION RATE: 17 BRPM | HEART RATE: 62 BPM

## 2022-03-24 DIAGNOSIS — Z00.00 MEDICARE ANNUAL WELLNESS VISIT, SUBSEQUENT: Primary | ICD-10-CM

## 2022-03-24 DIAGNOSIS — E11.9 TYPE 2 DIABETES MELLITUS WITHOUT COMPLICATION, WITHOUT LONG-TERM CURRENT USE OF INSULIN (HCC): ICD-10-CM

## 2022-03-24 DIAGNOSIS — R35.1 NOCTURIA: ICD-10-CM

## 2022-03-24 DIAGNOSIS — E05.90 SUBCLINICAL HYPERTHYROIDISM: ICD-10-CM

## 2022-03-24 DIAGNOSIS — I10 ESSENTIAL HYPERTENSION: ICD-10-CM

## 2022-03-24 LAB
BILIRUB UR QL STRIP: NEGATIVE
GLUCOSE UR-MCNC: NEGATIVE MG/DL
KETONES P FAST UR STRIP-MCNC: NEGATIVE MG/DL
PH UR STRIP: 6 [PH] (ref 4.6–8)
PROT UR QL STRIP: NEGATIVE
SP GR UR STRIP: 1.01 (ref 1–1.03)
UA UROBILINOGEN AMB POC: NORMAL (ref 0.2–1)
URINALYSIS CLARITY POC: CLEAR
URINALYSIS COLOR POC: YELLOW
URINE BLOOD POC: NEGATIVE
URINE LEUKOCYTES POC: NEGATIVE
URINE NITRITES POC: NEGATIVE

## 2022-03-24 PROCEDURE — 3017F COLORECTAL CA SCREEN DOC REV: CPT | Performed by: INTERNAL MEDICINE

## 2022-03-24 PROCEDURE — G8427 DOCREV CUR MEDS BY ELIG CLIN: HCPCS | Performed by: INTERNAL MEDICINE

## 2022-03-24 PROCEDURE — G8754 DIAS BP LESS 90: HCPCS | Performed by: INTERNAL MEDICINE

## 2022-03-24 PROCEDURE — G8753 SYS BP > OR = 140: HCPCS | Performed by: INTERNAL MEDICINE

## 2022-03-24 PROCEDURE — G8510 SCR DEP NEG, NO PLAN REQD: HCPCS | Performed by: INTERNAL MEDICINE

## 2022-03-24 PROCEDURE — 3046F HEMOGLOBIN A1C LEVEL >9.0%: CPT | Performed by: INTERNAL MEDICINE

## 2022-03-24 PROCEDURE — 2022F DILAT RTA XM EVC RTNOPTHY: CPT | Performed by: INTERNAL MEDICINE

## 2022-03-24 PROCEDURE — 1101F PT FALLS ASSESS-DOCD LE1/YR: CPT | Performed by: INTERNAL MEDICINE

## 2022-03-24 PROCEDURE — G8536 NO DOC ELDER MAL SCRN: HCPCS | Performed by: INTERNAL MEDICINE

## 2022-03-24 PROCEDURE — 81000 URINALYSIS NONAUTO W/SCOPE: CPT | Performed by: INTERNAL MEDICINE

## 2022-03-24 PROCEDURE — G0439 PPPS, SUBSEQ VISIT: HCPCS | Performed by: INTERNAL MEDICINE

## 2022-03-24 PROCEDURE — G8419 CALC BMI OUT NRM PARAM NOF/U: HCPCS | Performed by: INTERNAL MEDICINE

## 2022-03-24 NOTE — LETTER
3/24/2022 7:45 AM    Mr. Sheba Alexis  [address] [facility] [address]      RE:  Sheba Alexis   1950      Dear Dr Myrna Pedro is a patient of [address] [place] [address] with Zuleika Bergeron MD. Please fax this patient's most recent eye exam so that we may update the patient's records for continuity of care. Our office number is [phone] if you should have any additional questions.      Our fax number: [phone]    Thank you           Sincerely,      Manolo Ryan MD

## 2022-03-24 NOTE — PATIENT INSTRUCTIONS
Medicare Wellness Visit, Male    The best way to live healthy is to have a lifestyle where you eat a well-balanced diet, exercise regularly, limit alcohol use, and quit all forms of tobacco/nicotine, if applicable. Regular preventive services are another way to keep healthy. Preventive services (vaccines, screening tests, monitoring & exams) can help personalize your care plan, which helps you manage your own care. Screening tests can find health problems at the earliest stages, when they are easiest to treat. [facility] follows the current, evidence-based guidelines published by the [facility] ([facility]) when recommending preventive services for our patients. Because we follow these guidelines, sometimes recommendations change over time as research supports it. (For example, a prostate screening blood test is no longer routinely recommended for men with no symptoms). Of course, you and your doctor may decide to screen more often for some diseases, based on your risk and co-morbidities (chronic disease you are already diagnosed with). Preventive services for you include:  - Medicare offers their members a free annual wellness visit, which is time for you and your primary care provider to discuss and plan for your preventive service needs. Take advantage of this benefit every year!  -All adults over age 72 should receive the recommended pneumonia vaccines. Current USPSTF guidelines recommend a series of two vaccines for the best pneumonia protection.   -All adults should have a flu vaccine yearly and tetanus vaccine every 10 years.  -All adults age 48 and older should receive the shingles vaccines (series of two vaccines).        -All adults age 38-68 who are overweight should have a diabetes screening test once every three years.   -Other screening tests & preventive services for persons with diabetes include: an eye exam to screen for diabetic retinopathy, a kidney function test, a foot exam, and stricter control over your cholesterol.   -Cardiovascular screening for adults with routine risk involves an electrocardiogram (ECG) at intervals determined by the provider.   -Colorectal cancer screening should be done for adults age 54-65 with no increased risk factors for colorectal cancer. There are a number of acceptable methods of screening for this type of cancer. Each test has its own benefits and drawbacks. Discuss with your provider what is most appropriate for you during your annual wellness visit. The different tests include: colonoscopy (considered the best screening method), a fecal occult blood test, a fecal DNA test, and sigmoidoscopy.  -All adults born between [facility] should be screened once for Hepatitis C.  -An Abdominal Aortic Aneurysm (AAA) Screening is recommended for men age 73-68 who has ever smoked in their lifetime.      Here is a list of your current Health Maintenance items (your personalized list of preventive services) with a due date:  Health Maintenance Due   Topic Date Due    Diabetic Foot Care  02/22/2022    Hemoglobin A1C    02/22/2022    Albumin Urine Test  02/22/2022    Cholesterol Test   02/22/2022

## 2022-03-24 NOTE — PROGRESS NOTES
Annabel Mcdonald is a 66 y.o. male presenting for/with:    Chief Complaint   Patient presents with    Annual Wellness Visit    Thyroid Problem     is followed by Dr Sophie Pittman. Next appt with him on 5/3/22    Immunization/Injection     Vaccines updated    Referral Follow Up     Last eye exam completed with Dr Ld Mancia - Letter sent requesting records    Hypertension     States BP always goes up at the doctors office. Has log from home testing. Average 114-135 / 56-66. Reports lowest BP is after 5 times of checking       Visit Vitals  BP (!) 154/82 (BP 1 Location: Left upper arm, BP Patient Position: Sitting, BP Cuff Size: Adult long)   Pulse 62   Temp 98.2 °F (36.8 °C) (Temporal)   Resp 17   Ht 5' 11\" (1.803 m)   Wt 187 lb (84.8 kg)   SpO2 98%   BMI 26.08 kg/m²     Pain Scale: 0 - No pain/10  Pain Location:     1. \"Have you been to the ER, urgent care clinic since your last visit? Hospitalized since your last visit? \" No    2. \"Have you seen or consulted any other health care providers outside of the [address] [place] [address] since your last visit? \" No     3. For patients aged 39-70: Has the patient had a colonoscopy / FIT/ Cologuard? Yes - no Care Gap present      If the patient is female:    4. For patients aged 41-77: Has the patient had a mammogram within the past 2 years? NA - based on age or sex      11. For patients aged 21-65: Has the patient had a pap smear?  NA      Symptom review:  NO  Fever   NO  Shaking chills  NO  Cough  NO  Body aches  NO  Coughing up blood  NO  Chest congestion  NO  Chest pain  NO  Shortness of breath  NO  Profound Loss of smell/taste  NO  Nausea/Vomiting   NO  Loose stool/Diarrhea  NO  any skin issues    Patient Risk Factors Reviewed as follows:  NO  have you been in Close contact with confirmed COVID19 patient   NO  History of recent travel to affected geographical areas within the past 14 days  NO  COPD  NO  Active Cancer/Leukemia/Lymphoma/Chemotherapy  NO  Oral steroid use  NO Pregnant  NO  Diabetes Mellitus  YES  Heart disease  NO  Asthma  NO Health care worker at home  [address] care worker  NO Is there a Pregnant Woman in the home  NO Dialysis pt in the home   NO a large number of people living in the home    Learning Assessment 3/24/2022   PRIMARY LEARNER Patient   PRIMARY LANGUAGE ENGLISH   LEARNER PREFERENCE PRIMARY LISTENING   ANSWERED BY self   RELATIONSHIP SELF     Fall Risk Assessment, last 12 mths 3/24/2022   Able to walk? Yes   Fall in past 12 months? 0   Do you feel unsteady? 0   Are you worried about falling 0       3 most recent PHQ Screens 3/24/2022   Little interest or pleasure in doing things Not at all   Feeling down, depressed, irritable, or hopeless Not at all   Total Score PHQ 2 0     Abuse Screening Questionnaire 3/24/2022   Do you ever feel afraid of your partner? N   Are you in a relationship with someone who physically or mentally threatens you? N   Is it safe for you to go home?  Y       ADL Assessment 3/24/2022   Feeding yourself No Help Needed   Getting from bed to chair No Help Needed   Getting dressed No Help Needed   Bathing or showering No Help Needed   Walk across the room (includes cane/walker) No Help Needed   Using the telphone No Help Needed   Taking your medications No Help Needed   Preparing meals No Help Needed   Managing money (expenses/bills) No Help Needed   Moderately strenuous housework (laundry) No Help Needed   Shopping for personal items (toiletries/medicines) No Help Needed   Shopping for groceries No Help Needed   Driving No Help Needed   Climbing a flight of stairs No Help Needed   Getting to places beyond walking distances No Help Needed      Advance Care Planning 10/11/2021   Patient's Healthcare Decision Maker is: Legal Next of Kin   Confirm Advance Directive None   Patient Would Like to Complete Advance Directive No        This is the Subsequent Medicare Annual Wellness Exam, performed 12 months or more after the Initial AWV or the last Subsequent AWV    I have reviewed the patient's medical history in detail and updated the computerized patient record. Assessment/Plan   Education and counseling provided:  Are appropriate based on today's review and evaluation    1. Medicare annual wellness visit, subsequent  2. Type 2 diabetes mellitus without complication, without long-term current use of insulin ([facility] [address]  3. Essential hypertension  4. Subclinical hyperthyroidism       Depression Risk Factor Screening     3 most recent PHQ Screens 3/24/2022   Little interest or pleasure in doing things Not at all   Feeling down, depressed, irritable, or hopeless Not at all   Total Score PHQ 2 0       Alcohol & Drug Abuse Risk Screen    Do you average more than 1 drink per night or more than 7 drinks a week: No    In the past three months have you have had more than 4 drinks containing alcohol on one occasion: No          Functional Ability and Level of Safety    Hearing: Hearing is good. Activities of Daily Living: The home contains: no safety equipment. Patient does total self care      Ambulation: with no difficulty     Fall Risk:  Fall Risk Assessment, last 12 mths 3/24/2022   Able to walk? Yes   Fall in past 12 months? 0   Do you feel unsteady?  0   Are you worried about falling 0      Abuse Screen:  Patient is not abused       Cognitive Screening    Has your family/caregiver stated any concerns about your memory: no       Health Maintenance Due     Health Maintenance Due   Topic Date Due    Foot Exam Q1  02/22/2022    A1C test (Diabetic or Prediabetic)  02/22/2022    MICROALBUMIN Q1  02/22/2022    Lipid Screen  02/22/2022       Patient Care Team   Patient Care Team:  Ryan Jacob MD as PCP - General (Internal Medicine)  Ryan Jacob MD as PCP - [facility]  Elvi Wan MD as Consulting Provider (Endocrinology)    History     Patient Active Problem List   Diagnosis Code    HTN (hypertension) I10   Do Barnes Hyperlipidemia E78.5    Elevated liver enzymes R74.8    Low TSH level R79.89    Subclinical hyperthyroidism E05.90    DM2 (diabetes mellitus, type 2) (HCC) E11.9     Past Medical History:   Diagnosis Date    Conductive hearing loss, middle ear     DM2 (diabetes mellitus, type 2) ([address] 10/19/2016    resolved after weight loss    Elevated liver enzymes     HTN (hypertension)     resolved after weight loss 08/18    Hyperlipidemia     Subclinical hyperthyroidism     Tinnitus       Past Surgical History:   Procedure Laterality Date    COLONOSCOPY N/A 9/20/2018    [facility] END[facility]COPY    HX COLONOSCOPY  2004    HX MOHS PROCEDURES Left     also right shoulder surgery Ca deposits spurs and rotator cuff lt     Current Outpatient Medications   Medication Sig Dispense Refill    vit A/vit C/vit E/zinc/copper (PRESERVISION AREDS PO) Take  by mouth.  metoprolol succinate (TOPROL-XL) 25 mg XL tablet TAKE 1 TABLET BY MOUTH  DAILY 90 Tablet 0    hydroCHLOROthiazide (HYDRODIURIL) 12.5 mg tablet TAKE 1 TABLET BY MOUTH  DAILY 90 Tablet 0    losartan (COZAAR) 50 mg tablet TAKE 1 TABLET BY MOUTH DAILY; REPLACES VALSARTAN AND IRBESARTAN 90 Tablet 4    atorvastatin (LIPITOR) 40 mg tablet TAKE 1 TABLET BY MOUTH EVERY DAY 90 Tab 4    ascorbic acid, vitamin C, (Vitamin C) 500 mg tablet Take 1,000 mg by mouth daily.  cholecalciferol, vitamin D3, (VITAMIN D3) 2,000 unit tab Take  by mouth.  omega-3 fatty acids-vitamin e (FISH OIL) 1,000 mg cap Take 1 Capsule by mouth daily.       methIMAzole (TAPAZOLE) 5 mg tablet TAKE 1/2 TABLET BY MOUTH  EVERY OTHER DAY 45 Tablet 3     Allergies   Allergen Reactions    Ace Inhibitors Cough    Amlodipine Not Reported This Time    Claritin [Loratadine] Hives    Pcn [Penicillins] Not Reported This Time       Family History   Problem Relation Age of Onset    Alzheimer's Disease Mother     Cancer Father         smoker, throat cancer  Cancer Brother         pancreatic    OSTEOARTHRITIS Brother     No Known Problems Maternal Grandmother     Pneumonia Maternal Grandfather     No Known Problems Paternal Grandmother     No Known Problems Paternal Grandfather     No Known Problems Brother      Social History     Tobacco Use    Smoking status: Former Smoker     Quit date: 1988     Years since quittin.3    Smokeless tobacco: Never Used   Substance Use Topics    Alcohol use: No     Alcohol/week: 0.0 standard drinks       Results for orders placed or performed in visit on 22   AMB POC URINALYSIS DIP STICK MANUAL W/ MICRO   Result Value Ref Range    Color (UA POC) Yellow     Clarity (UA POC) Clear     Glucose (UA POC) Negative Negative    Bilirubin (UA POC) Negative Negative    Ketones (UA POC) Negative Negative    Specific gravity (UA POC) 1.015 1.001 - 1.035    Blood (UA POC) Negative Negative    pH (UA POC) 6.0 4.6 - 8.0    Protein (UA POC) Negative Negative    Urobilinogen (UA POC) 0.2 mg/dL 0.2 - 1    Nitrites (UA POC) Negative Negative    Leukocyte esterase (UA POC) Negative Negative       Hue Duncan

## 2022-03-25 LAB
ALBUMIN SERPL-MCNC: 4.4 G/DL (ref 3.5–5)
ALBUMIN/GLOB SERPL: 1.7 {RATIO} (ref 1.1–2.2)
ALP SERPL-CCNC: 53 U/L (ref 45–117)
ALT SERPL-CCNC: 38 U/L (ref 12–78)
ANION GAP SERPL CALC-SCNC: 5 MMOL/L (ref 5–15)
AST SERPL-CCNC: 30 U/L (ref 15–37)
BASOPHILS # BLD: 0.1 K/UL (ref 0–0.1)
BASOPHILS NFR BLD: 1 % (ref 0–1)
BILIRUB SERPL-MCNC: 1.4 MG/DL (ref 0.2–1)
BUN SERPL-MCNC: 20 MG/DL (ref 6–20)
BUN/CREAT SERPL: 22 (ref 12–20)
CALCIUM SERPL-MCNC: 9.1 MG/DL (ref 8.5–10.1)
CHLORIDE SERPL-SCNC: 105 MMOL/L (ref 97–108)
CHOLEST SERPL-MCNC: 130 MG/DL
CO2 SERPL-SCNC: 28 MMOL/L (ref 21–32)
CREAT SERPL-MCNC: 0.89 MG/DL (ref 0.7–1.3)
DIFFERENTIAL METHOD BLD: ABNORMAL
EOSINOPHIL # BLD: 0.1 K/UL (ref 0–0.4)
EOSINOPHIL NFR BLD: 1 % (ref 0–7)
ERYTHROCYTE [DISTWIDTH] IN BLOOD BY AUTOMATED COUNT: 12.7 % (ref 11.5–14.5)
EST. AVERAGE GLUCOSE BLD GHB EST-MCNC: 128 MG/DL
GLOBULIN SER CALC-MCNC: 2.6 G/DL (ref 2–4)
GLUCOSE SERPL-MCNC: 125 MG/DL (ref 65–100)
HBA1C MFR BLD: 6.1 % (ref 4–5.6)
HCT VFR BLD AUTO: 45.7 % (ref 36.6–50.3)
HDLC SERPL-MCNC: 69 MG/DL
HDLC SERPL: 1.9 {RATIO} (ref 0–5)
HGB BLD-MCNC: 14.8 G/DL (ref 12.1–17)
IMM GRANULOCYTES # BLD AUTO: 0.1 K/UL (ref 0–0.04)
IMM GRANULOCYTES NFR BLD AUTO: 1 % (ref 0–0.5)
LDLC SERPL CALC-MCNC: 50.6 MG/DL (ref 0–100)
LYMPHOCYTES # BLD: 1.8 K/UL (ref 0.8–3.5)
LYMPHOCYTES NFR BLD: 23 % (ref 12–49)
MCH RBC QN AUTO: 28.7 PG (ref 26–34)
MCHC RBC AUTO-ENTMCNC: 32.4 G/DL (ref 30–36.5)
MCV RBC AUTO: 88.7 FL (ref 80–99)
MONOCYTES # BLD: 0.6 K/UL (ref 0–1)
MONOCYTES NFR BLD: 8 % (ref 5–13)
NEUTS SEG # BLD: 5.3 K/UL (ref 1.8–8)
NEUTS SEG NFR BLD: 67 % (ref 32–75)
NRBC # BLD: 0 K/UL (ref 0–0.01)
NRBC BLD-RTO: 0 PER 100 WBC
PLATELET # BLD AUTO: 210 K/UL (ref 150–400)
PMV BLD AUTO: 9.7 FL (ref 8.9–12.9)
POTASSIUM SERPL-SCNC: 4.3 MMOL/L (ref 3.5–5.1)
PROT SERPL-MCNC: 7 G/DL (ref 6.4–8.2)
RBC # BLD AUTO: 5.15 M/UL (ref 4.1–5.7)
SODIUM SERPL-SCNC: 138 MMOL/L (ref 136–145)
T4 FREE SERPL-MCNC: 1.1 NG/DL (ref 0.8–1.5)
T4 SERPL-MCNC: 10 UG/DL (ref 4.5–12.1)
TRIGL SERPL-MCNC: 52 MG/DL (ref ?–150)
TSH SERPL DL<=0.05 MIU/L-ACNC: 0.49 UIU/ML (ref 0.36–3.74)
VLDLC SERPL CALC-MCNC: 10.4 MG/DL
WBC # BLD AUTO: 7.8 K/UL (ref 4.1–11.1)

## 2022-04-25 ENCOUNTER — HOSPITAL ENCOUNTER (OUTPATIENT)
Dept: ULTRASOUND IMAGING | Age: 72
Discharge: HOME | End: 2022-04-25
Attending: INTERNAL MEDICINE
Payer: MEDICARE

## 2022-04-25 ENCOUNTER — HOSPITAL ENCOUNTER (OUTPATIENT)
Dept: LAB | Age: 72
Discharge: HOME | End: 2022-04-25

## 2022-04-25 DIAGNOSIS — E04.2 MULTINODULAR GOITER: ICD-10-CM

## 2022-04-25 PROCEDURE — 99001 SPECIMEN HANDLING PT-LAB: CPT

## 2022-04-25 PROCEDURE — 76536 US EXAM OF HEAD AND NECK: CPT

## 2022-04-26 LAB
T3 SERPL-MCNC: 121 NG/DL (ref 71–180)
T4 FREE SERPL-MCNC: 1.34 NG/DL (ref 0.82–1.77)
TSH SERPL DL<=0.005 MIU/L-ACNC: 0.53 UIU/ML (ref 0.45–4.5)

## 2022-05-01 DIAGNOSIS — E05.90 HYPERTHYROIDISM: ICD-10-CM

## 2022-05-13 ENCOUNTER — OFFICE VISIT (OUTPATIENT)
Dept: ENDOCRINOLOGY | Age: 72
End: 2022-05-13
Payer: MEDICARE

## 2022-05-13 VITALS
HEIGHT: 71 IN | HEART RATE: 66 BPM | WEIGHT: 182.4 LBS | DIASTOLIC BLOOD PRESSURE: 61 MMHG | SYSTOLIC BLOOD PRESSURE: 151 MMHG | BODY MASS INDEX: 25.54 KG/M2

## 2022-05-13 DIAGNOSIS — E04.2 MULTINODULAR GOITER: ICD-10-CM

## 2022-05-13 DIAGNOSIS — E05.90 SUBCLINICAL HYPERTHYROIDISM: Primary | ICD-10-CM

## 2022-05-13 PROCEDURE — G8754 DIAS BP LESS 90: HCPCS | Performed by: INTERNAL MEDICINE

## 2022-05-13 PROCEDURE — 3017F COLORECTAL CA SCREEN DOC REV: CPT | Performed by: INTERNAL MEDICINE

## 2022-05-13 PROCEDURE — G8419 CALC BMI OUT NRM PARAM NOF/U: HCPCS | Performed by: INTERNAL MEDICINE

## 2022-05-13 PROCEDURE — G8427 DOCREV CUR MEDS BY ELIG CLIN: HCPCS | Performed by: INTERNAL MEDICINE

## 2022-05-13 PROCEDURE — 1101F PT FALLS ASSESS-DOCD LE1/YR: CPT | Performed by: INTERNAL MEDICINE

## 2022-05-13 PROCEDURE — G8536 NO DOC ELDER MAL SCRN: HCPCS | Performed by: INTERNAL MEDICINE

## 2022-05-13 PROCEDURE — 99214 OFFICE O/P EST MOD 30 MIN: CPT | Performed by: INTERNAL MEDICINE

## 2022-05-13 PROCEDURE — G8432 DEP SCR NOT DOC, RNG: HCPCS | Performed by: INTERNAL MEDICINE

## 2022-05-13 PROCEDURE — G8753 SYS BP > OR = 140: HCPCS | Performed by: INTERNAL MEDICINE

## 2022-05-13 NOTE — LETTER
5/13/2022    Patient: Raphael Luna   YOB: 1950   Date of Visit: 5/13/2022     Jesse Diaz MD  [address] [place] [address]  Via In Basket    Dear Jesse Diaz MD,      Thank you for referring Mr. Kelsey Alvarez to [address] [place] [address] for evaluation. My notes for this consultation are attached. If you have questions, please do not hesitate to call me. I look forward to following your patient along with you.       Sincerely,    Stacey Hoffman MD

## 2022-05-13 NOTE — PROGRESS NOTES
Chief Complaint   Patient presents with    Thyroid Problem     pcp and pharmacy verified     History of Present Illness: Charles Forbes is a 66 y.o. male here for follow up of subclinical hyperthyroidism and Multinodular goiter. In October 2019, as part of his annual wellness visit he had TFTs drawn. His TSH was 0.050 with a TT4 of 9.7. He was sent for a thyroid Uptake and Scan, which did not show any evidence of hot nodules or hyperactivity, but it did show an area of photopenia in the left lobe. He had repeat TFTs in January 2020 and his TSH was 0.016 with FT4 of 1.69, TT3 of 124 and Thyroid Ab Panel was negative. He was then sent for a thyroid US which showed multiple nodules 4.7cm in the left (the region of photopenia and increased blood flow) and 2.5cm in the right lobe. Pt notes he was first diagnosed with hyperthyroid  \"over 30 years ago\". He has never been treated for hyperthyroidism/subclinical hyperthyroidism. At our initial visit in March 2020 I started pt on MMI 5mg daily. I also sent him for an FNA of the 4.7 cm left nodule and the 2.5 cm right nodule. The pathology on both came back benign. In May 2020. Pt received J&J vaccine and the Moderna booster. Pt denies any recent illnesses, injuries or hospitalizations. His TSH last week was 0.533 with FT4 of 1.34 and TT3 of 121. Pt is still taking the MMI 2.5mg every other day. \"I am going to the gym to work out every day and other than that I stay at home\". He denies issues of CP, SOB, palpitations (\"my resting HR is in the 50's). He denies issues of heat or cold intolerance, diarrhea or constipation. He denies issues of dysphagia or dysphonia. He denies any changes in his neck. He denies any cervical swelling or LAD.         Current Outpatient Medications   Medication Sig    atorvastatin (LIPITOR) 40 mg tablet TAKE 1 TABLET BY MOUTH  DAILY    losartan (COZAAR) 50 mg tablet TAKE 1 TABLET BY MOUTH  DAILY    hydroCHLOROthiazide (HYDRODIURIL) 12.5 mg tablet TAKE 1 TABLET BY MOUTH  DAILY    metoprolol succinate (TOPROL-XL) 25 mg XL tablet TAKE 1 TABLET BY MOUTH  DAILY    vit A/vit C/vit E/zinc/copper (PRESERVISION AREDS PO) Take  by mouth.  methIMAzole (TAPAZOLE) 5 mg tablet TAKE 1/2 TABLET BY MOUTH  EVERY OTHER DAY    ascorbic acid, vitamin C, (Vitamin C) 500 mg tablet Take 1,000 mg by mouth daily.  cholecalciferol, vitamin D3, (VITAMIN D3) 2,000 unit tab Take  by mouth.  omega-3 fatty acids-vitamin e (FISH OIL) 1,000 mg cap Take 1 Capsule by mouth daily. No current facility-administered medications for this visit. Allergies   Allergen Reactions    Ace Inhibitors Cough    Amlodipine Not Reported This Time    Claritin [Loratadine] Hives    Pcn [Penicillins] Not Reported This Time     Review of Systems:  - Cardiovascular: no chest pain  - Neurological: no tremors  - Integumentary: skin is normal    Physical Examination:  Blood pressure (!) 151/61, pulse 66, height 5' 11\" (1.803 m), weight 182 lb 6.4 oz (82.7 kg). - General: pleasant, no distress, good eye contact   - Neck: supple, + thyroid nodules palpated in the left and right lobes, no LAD, no thyroid bruits, or carotid bruits, no supraclavicular or dorsocervical fat pads  - Cardiovascular: regular, normal rate, nl s1 and s2, no m/r/g   - Integumentary: skin is normal, no edema  - Neurological: reflexes 2+ at biceps, no tremors  - Psychiatric: normal mood and affect    Data Reviewed:   Component      Latest Ref Rng & Units 4/25/2022   TSH      0.450 - 4.500 uIU/mL 0.533   T4, Free      0.82 - 1.77 ng/dL 1.34   T3, total      71 - 180 ng/dL 121     Narrative & Impression   EXAM: US THYROID/PARATHYROID/SOFT TISS      INDICATION: Multinodular goiter.     COMPARISON: Ultrasound examination dated 5/4/2021     TECHNIQUE: Real-time sonography of the thyroid gland was performed with a high  frequency linear transducer.  Multiple static images were obtained.     FINDINGS:  The thyroid is inhomogeneous in echotexture. Focal nodules are noted within both  lobes of the gland. In the mid/upper portion of the right lobe, an inhomogeneous  appearing, vascular nodule (measuring 1.3 cm x 0.7 cm x 1.2 cm) in size is  present. In the lateral aspect of the mid/lower portion of the right lobe, a 3.0  cm x 1.5 cm x 2.2 cm inhomogeneous appearing nodule (with associated  vascularity) is present. In the lateral aspect of the mid/inferior aspect of the  left lobe, a 5.3 cm x 4.1 cm x 5.5 cm inhomogeneous appearing, vascular nodule  is present as compared with the previous examination, there has been an increase  in size of this lesion. (Previous measurement of 5.0 cm x 2.8 cm x 4.9 cm). .     The right lobe measures 7.1 cm x 2.5 cm x 2.6 cm in size and the left lobe  measures 10.9 cm x 4.5 cm x 3.5 cm in size. The isthmus measures 1 cm in  thickness.     The parathyroid glands were not visualized.     The lateral aspect of both sides of neck were evaluated. No abnormalities were  noted in these regions.     IMPRESSION     1. Enlargement of the thyroid gland as described above. 2. Presence of focal nodules within both lobes of the gland. 3. Nonvisualization of the parathyroid glands. 4. No abnormalities noted in the region of the lateral aspects of the neck         Assessment/Plan:   1) Subclinical Hyperthyroidism > Pt is clinically euthyroid. His TSH last week was 0.533 with FT4 of 1.34 and TT3 of 121. Pt to continue the MMI 2.5mg every other day. 2) Multinodular Goiter > He had benign FNAs in May 2020. His repeat Thyroid US in May 2021 did not show any clinically significant changes. From May 2021 the [address] in April 2022 did not show any clinically significant changes. Will repeat the [address] in April 2023. Pt voices understanding and agreement with the plan. RTC 6 months  Follow-up and Dispositions    · Return in about 6 months (around 11/13/2022).          Copy sent to:   Dr. Tish Becerril

## 2022-10-18 ENCOUNTER — CLINICAL SUPPORT (OUTPATIENT)
Dept: FAMILY MEDICINE CLINIC | Age: 72
End: 2022-10-18
Payer: MEDICARE

## 2022-10-18 DIAGNOSIS — Z23 NEEDS FLU SHOT: Primary | ICD-10-CM

## 2022-10-18 PROCEDURE — G0008 ADMIN INFLUENZA VIRUS VAC: HCPCS | Performed by: INTERNAL MEDICINE

## 2022-10-18 PROCEDURE — 90694 VACC AIIV4 NO PRSRV 0.5ML IM: CPT | Performed by: INTERNAL MEDICINE

## 2022-10-18 NOTE — PROGRESS NOTES
After obtaining consent, and per orders of Dr. Shadi Betancourt, injection of Fluad to (L) deltoid given by Diann Castro. Patient instructed to remain in clinic for 20 minutes afterwards, and to report any adverse reaction to me immediately.

## 2022-10-18 NOTE — PATIENT INSTRUCTIONS
Vaccine Information Statement    Influenza (Flu) Vaccine (Inactivated or Recombinant): What You Need to Know    Many vaccine information statements are available in [place] and other languages. See www.immunize.org/vis. Hojas de información sobre vacunas están disponibles en español y en muchos otros idiomas. Visite www.immunize.org/vis. 1. Why get vaccinated? Influenza vaccine can prevent influenza (flu). Flu is a contagious disease that spreads around the [facility] every year, usually between October and May. Anyone can get the flu, but it is more dangerous for some people. Infants and young children, people 72 years and older, pregnant people, and people with certain health conditions or a weakened immune system are at greatest risk of flu complications. Pneumonia, bronchitis, sinus infections, and ear infections are examples of flu-related complications. If you have a medical condition, such as heart disease, cancer, or diabetes, flu can make it worse. Flu can cause fever and chills, sore throat, muscle aches, fatigue, cough, headache, and runny or stuffy nose. Some people may have vomiting and diarrhea, though this is more common in children than adults. In an average year, thousands of people in the [facility] die from flu, and many more are hospitalized. Flu vaccine prevents millions of illnesses and flu-related visits to the doctor each year. 2. Influenza vaccines     CDC recommends everyone 6 months and older get vaccinated every flu season. Children 6 months through 6years of age may need 2 doses during a single flu season. Everyone else needs only 1 dose each flu season. It takes about 2 weeks for protection to develop after vaccination. There are many flu viruses, and they are always changing. Each year a new flu vaccine is made to protect against the influenza viruses believed to be likely to cause disease in the upcoming flu season.  Even when the vaccine doesnt exactly match these viruses, it may still provide some protection. Influenza vaccine does not cause flu. Influenza vaccine may be given at the same time as other vaccines. 3. Talk with your health care provider    Tell your vaccination provider if the person getting the vaccine:  Has had an allergic reaction after a previous dose of influenza vaccine, or has any severe, life-threatening allergies   Has ever had Guillain-Barré Syndrome (also called GBS)    In some cases, your health care provider may decide to postpone influenza vaccination until a future visit. Influenza vaccine can be administered at any time during pregnancy. People who are or will be pregnant during influenza season should receive inactivated influenza vaccine. People with minor illnesses, such as a cold, may be vaccinated. People who are moderately or severely ill should usually wait until they recover before getting influenza vaccine. Your health care provider can give you more information. 4. Risks of a vaccine reaction    Soreness, redness, and swelling where the shot is given, fever, muscle aches, and headache can happen after influenza vaccination. There may be a very small increased risk of Guillain-Barré Syndrome (GBS) after inactivated influenza vaccine (the flu shot). [place] children who get the flu shot along with pneumococcal vaccine (PCV13) and/or DTaP vaccine at the same time might be slightly more likely to have a seizure caused by fever. Tell your health care provider if a child who is getting flu vaccine has ever had a seizure. People sometimes faint after medical procedures, including vaccination. Tell your provider if you feel dizzy or have vision changes or ringing in the ears. As with any medicine, there is a very remote chance of a vaccine causing a severe allergic reaction, other serious injury, or death. 5. What if there is a serious problem?     An allergic reaction could occur after the vaccinated person leaves the clinic. If you see signs of a severe allergic reaction (hives, swelling of the face and throat, difficulty breathing, a fast heartbeat, dizziness, or weakness), call 9-1-1 and get the person to the nearest hospital.    For other signs that concern you, call your health care provider. Adverse reactions should be reported to the Vaccine Adverse Event Reporting System (VAERS). Your health care provider will usually file this report, or you can do it yourself. Visit the VAERS website at www.vaers. [facility].gov or call 7-722.367.1016. VAERS is only for reporting reactions, and VAERS staff members do not give medical advice. 6. The National Vaccine Injury Compensation Program    The [facility] Vaccine Injury Compensation Program (VICP) is a federal program that was created to compensate people who may have been injured by certain vaccines. Claims regarding alleged injury or death due to vaccination have a time limit for filing, which may be as short as two years. Visit the VICP website at www.[facility].gov/vaccinecompensation or call 3-545.806.2459 to learn about the program and about filing a claim. 7. How can I learn more? Ask your health care provider. Call your local or state health department. Visit the website of the Food and Drug Administration (FDA) for vaccine package inserts and additional information at www.fda.gov/vaccines-blood-biologics/vaccines. Contact the Centers for Disease Control and Prevention (CDC): Call 4-120.993.8323 (1-800-CDC-INFO) or  Visit CDCs influenza website at www.cdc.gov/flu. Vaccine Information Statement   Inactivated Influenza Vaccine   8/6/2021  [address] [id number]   Department of Health and Human Services  Centers for Disease Control and Prevention    Office Use Only

## 2022-10-24 ENCOUNTER — HOSPITAL ENCOUNTER (OUTPATIENT)
Dept: LAB | Age: 72
Discharge: HOME | End: 2022-10-24

## 2022-10-24 PROCEDURE — 99001 SPECIMEN HANDLING PT-LAB: CPT

## 2022-10-25 LAB
T3 SERPL-MCNC: 107 NG/DL (ref 71–180)
T4 FREE SERPL-MCNC: 1.38 NG/DL (ref 0.82–1.77)
TSH SERPL DL<=0.005 MIU/L-ACNC: 0.44 UIU/ML (ref 0.45–4.5)

## 2022-11-01 DIAGNOSIS — E05.90 SUBCLINICAL HYPERTHYROIDISM: ICD-10-CM

## 2022-11-14 ENCOUNTER — OFFICE VISIT (OUTPATIENT)
Dept: ENDOCRINOLOGY | Age: 72
End: 2022-11-14
Payer: MEDICARE

## 2022-11-14 VITALS
HEART RATE: 60 BPM | BODY MASS INDEX: 26.59 KG/M2 | SYSTOLIC BLOOD PRESSURE: 156 MMHG | HEIGHT: 71 IN | WEIGHT: 189.9 LBS | DIASTOLIC BLOOD PRESSURE: 62 MMHG

## 2022-11-14 DIAGNOSIS — E04.2 MULTINODULAR GOITER: ICD-10-CM

## 2022-11-14 DIAGNOSIS — E05.90 SUBCLINICAL HYPERTHYROIDISM: Primary | ICD-10-CM

## 2022-11-14 PROCEDURE — 1101F PT FALLS ASSESS-DOCD LE1/YR: CPT | Performed by: INTERNAL MEDICINE

## 2022-11-14 PROCEDURE — G8427 DOCREV CUR MEDS BY ELIG CLIN: HCPCS | Performed by: INTERNAL MEDICINE

## 2022-11-14 PROCEDURE — 1123F ACP DISCUSS/DSCN MKR DOCD: CPT | Performed by: INTERNAL MEDICINE

## 2022-11-14 PROCEDURE — G8536 NO DOC ELDER MAL SCRN: HCPCS | Performed by: INTERNAL MEDICINE

## 2022-11-14 PROCEDURE — G8753 SYS BP > OR = 140: HCPCS | Performed by: INTERNAL MEDICINE

## 2022-11-14 PROCEDURE — G8754 DIAS BP LESS 90: HCPCS | Performed by: INTERNAL MEDICINE

## 2022-11-14 PROCEDURE — G8417 CALC BMI ABV UP PARAM F/U: HCPCS | Performed by: INTERNAL MEDICINE

## 2022-11-14 PROCEDURE — 3078F DIAST BP <80 MM HG: CPT | Performed by: INTERNAL MEDICINE

## 2022-11-14 PROCEDURE — 99214 OFFICE O/P EST MOD 30 MIN: CPT | Performed by: INTERNAL MEDICINE

## 2022-11-14 PROCEDURE — 3017F COLORECTAL CA SCREEN DOC REV: CPT | Performed by: INTERNAL MEDICINE

## 2022-11-14 PROCEDURE — 3074F SYST BP LT 130 MM HG: CPT | Performed by: INTERNAL MEDICINE

## 2022-11-14 PROCEDURE — G8432 DEP SCR NOT DOC, RNG: HCPCS | Performed by: INTERNAL MEDICINE

## 2022-11-14 NOTE — PROGRESS NOTES
Chief Complaint   Patient presents with    Thyroid Problem     Pcp and pharmacy verified       History of Present Illness: Bill Ring is a 74 y.o. male here for follow up of subclinical hyperthyroidism and Multinodular goiter. In October 2019, as part of his annual wellness visit he had TFTs drawn. His TSH was 0.050 with a TT4 of 9.7. He was sent for a thyroid Uptake and Scan, which did not show any evidence of hot nodules or hyperactivity, but it did show an area of photopenia in the left lobe. He had repeat TFTs in January 2020 and his TSH was 0.016 with FT4 of 1.69, TT3 of 124 and Thyroid Ab Panel was negative. He was then sent for a thyroid US which showed multiple nodules 4.7cm in the left (the region of photopenia and increased blood flow) and 2.5cm in the right lobe. Pt notes he was first diagnosed with hyperthyroid  \"over 30 years ago\". He has never been treated for hyperthyroidism/subclinical hyperthyroidism. At our initial visit in March 2020 I started pt on MMI 5mg daily. I also sent him for an FNA of the 4.7 cm left nodule and the 2.5 cm right nodule. The pathology on both came back benign. In May 2020. His repeat thyroid US in May 2021 and April 2022 were unchanged. Pt denies any recent illnesses, injuries or hospitalizations. His TSH last week was 0.444 with FT4 of 1.38 and TT3 of 107. Pt is still taking the MMI 2.5mg every other day. \"I am going to the gym to work out every day and other than that I stay at home and I am riding my bike 2-3 hours per day\". He denies issues of CP, SOB, palpitations (\"my resting HR is in the 50's). He denies issues of heat or cold intolerance, diarrhea or constipation. He denies issues of dysphagia or dysphonia. He denies any changes in his neck. He denies any cervical swelling or LAD.         Current Outpatient Medications   Medication Sig    atorvastatin (LIPITOR) 40 mg tablet TAKE 1 TABLET BY MOUTH  DAILY    losartan (COZAAR) 50 mg tablet TAKE 1 TABLET BY MOUTH  DAILY    hydroCHLOROthiazide (HYDRODIURIL) 12.5 mg tablet TAKE 1 TABLET BY MOUTH  DAILY    metoprolol succinate (TOPROL-XL) 25 mg XL tablet TAKE 1 TABLET BY MOUTH  DAILY    vit A/vit C/vit E/zinc/copper (PRESERVISION AREDS PO) Take  by mouth. methIMAzole (TAPAZOLE) 5 mg tablet TAKE 1/2 TABLET BY MOUTH  EVERY OTHER DAY    ascorbic acid, vitamin C, (VITAMIN C) 500 mg tablet Take 1,000 mg by mouth daily. cholecalciferol, vitamin D3, 50 mcg (2,000 unit) tab Take  by mouth. omega-3 fatty acids-vitamin e 1,000 mg cap Take 1 Capsule by mouth daily. No current facility-administered medications for this visit. Allergies   Allergen Reactions    Ace Inhibitors Cough    Amlodipine Not Reported This Time    Claritin [Loratadine] Hives    Pcn [Penicillins] Not Reported This Time     Review of Systems:  - Cardiovascular: no chest pain  - Neurological: no tremors  - Integumentary: skin is normal    Physical Examination:  Blood pressure (!) 156/62, pulse 60, height 5' 11\" (1.803 m), weight 189 lb 14.4 oz (86.1 kg). General: pleasant, no distress, good eye contact   Neck: supple, + thyroid nodules palpated in the left and right lobes, no LAD, no thyroid bruits, or carotid bruits, no supraclavicular or dorsocervical fat pads  Cardiovascular: regular, normal rate, nl s1 and s2, no m/r/g   Integumentary: skin is normal, no edema  Neurological: reflexes 2+ at biceps, no tremors  Psychiatric: normal mood and affect    Data Reviewed:   Component      Latest Ref Rng & Units 10/24/2022   TSH      0.450 - 4.500 uIU/mL 0.444 (L)   T4, Free      0.82 - 1.77 ng/dL 1.38   T3, total      71 - 180 ng/dL 107       Assessment/Plan:   1) Subclinical Hyperthyroidism > Pt is clinically euthyroid. His TSH last week was 0.444 with FT4 of 1.38 and TT3 of 107. Pt to continue the MMI 2.5mg every other day. 2) Multinodular Goiter > He had benign FNAs in May 2020.  His repeat Thyroid US in May 2021 did not show any clinically significant changes from May 2021. The [address] in April 2022 did not show any clinically significant changes. Will repeat the [address] in April 2023. Pt voices understanding and agreement with the plan.     RTC 6 months        Copy sent to:  Dr. Gildardo Callahan

## 2022-11-14 NOTE — LETTER
11/14/2022    Patient: Rolan Hernández   YOB: 1950   Date of Visit: 11/14/2022     Carmine Ayala MD  [address] [place] [address]  Via In Basket    Dear Carmine Ayala MD,      Thank you for referring Mr. See Dutta to [address] [place] [address] for evaluation. My notes for this consultation are attached. If you have questions, please do not hesitate to call me. I look forward to following your patient along with you.       Sincerely,    Inge Hammans, MD

## 2022-11-30 NOTE — PROGRESS NOTES
Anesthesia reports 250mg Propofol, 80mg Lidocaine and 500mL NS given during procedure. Received report from anesthesia staff on vital signs and status of patient.
Endoscope was pre-cleaned at the bedside immediately following procedure by [facility].
Glasses returned to patient
Specific interventions were implemented:

## 2022-12-13 DIAGNOSIS — I10 ESSENTIAL HYPERTENSION: ICD-10-CM

## 2022-12-13 DIAGNOSIS — E05.90 SUBCLINICAL HYPERTHYROIDISM: ICD-10-CM

## 2022-12-13 RX ORDER — ATORVASTATIN CALCIUM 40 MG/1
TABLET, FILM COATED ORAL
Qty: 90 TABLET | Refills: 4 | Status: SHIPPED | OUTPATIENT
Start: 2022-12-13 | End: 2022-12-15 | Stop reason: SDUPTHER

## 2022-12-13 RX ORDER — METHIMAZOLE 5 MG/1
TABLET ORAL
Qty: 45 TABLET | Refills: 4 | Status: SHIPPED | OUTPATIENT
Start: 2022-12-13 | End: 2022-12-15 | Stop reason: SDUPTHER

## 2022-12-13 RX ORDER — METOPROLOL SUCCINATE 25 MG/1
25 TABLET, EXTENDED RELEASE ORAL DAILY
Qty: 90 TABLET | Refills: 4 | Status: SHIPPED | OUTPATIENT
Start: 2022-12-13 | End: 2022-12-15 | Stop reason: SDUPTHER

## 2022-12-13 RX ORDER — HYDROCHLOROTHIAZIDE 12.5 MG/1
12.5 TABLET ORAL DAILY
Qty: 90 TABLET | Refills: 0 | Status: SHIPPED | OUTPATIENT
Start: 2022-12-13 | End: 2022-12-15 | Stop reason: SDUPTHER

## 2022-12-13 RX ORDER — LOSARTAN POTASSIUM 50 MG/1
50 TABLET ORAL DAILY
Qty: 90 TABLET | Refills: 0 | Status: SHIPPED | OUTPATIENT
Start: 2022-12-13 | End: 2022-12-15 | Stop reason: SDUPTHER

## 2022-12-15 DIAGNOSIS — E05.90 SUBCLINICAL HYPERTHYROIDISM: ICD-10-CM

## 2022-12-15 DIAGNOSIS — I10 ESSENTIAL HYPERTENSION: ICD-10-CM

## 2022-12-15 RX ORDER — METHIMAZOLE 5 MG/1
TABLET ORAL
Qty: 45 TABLET | Refills: 4 | Status: SHIPPED | OUTPATIENT
Start: 2022-12-15

## 2022-12-15 RX ORDER — METOPROLOL SUCCINATE 25 MG/1
25 TABLET, EXTENDED RELEASE ORAL DAILY
Qty: 90 TABLET | Refills: 4 | Status: SHIPPED | OUTPATIENT
Start: 2022-12-15

## 2022-12-15 RX ORDER — LOSARTAN POTASSIUM 50 MG/1
50 TABLET ORAL DAILY
Qty: 90 TABLET | Refills: 0 | Status: SHIPPED | OUTPATIENT
Start: 2022-12-15

## 2022-12-15 RX ORDER — ATORVASTATIN CALCIUM 40 MG/1
TABLET, FILM COATED ORAL
Qty: 90 TABLET | Refills: 4 | Status: SHIPPED | OUTPATIENT
Start: 2022-12-15

## 2022-12-15 RX ORDER — HYDROCHLOROTHIAZIDE 12.5 MG/1
12.5 TABLET ORAL DAILY
Qty: 90 TABLET | Refills: 0 | Status: SHIPPED | OUTPATIENT
Start: 2022-12-15

## 2022-12-15 NOTE — TELEPHONE ENCOUNTER
Requested Prescriptions     Pending Prescriptions Disp Refills    metoprolol succinate (TOPROL-XL) 25 mg XL tablet 90 Tablet 4     Sig: Take 1 Tablet by mouth daily. losartan (COZAAR) 50 mg tablet 90 Tablet 0     Sig: Take 1 Tablet by mouth daily. hydroCHLOROthiazide (HYDRODIURIL) 12.5 mg tablet 90 Tablet 0     Sig: Take 1 Tablet by mouth daily.     methIMAzole (TAPAZOLE) 5 mg tablet 45 Tablet 4     Sig: TAKE 1/2 TABLET BY MOUTH  EVERY OTHER DAY    atorvastatin (LIPITOR) 40 mg tablet 90 Tablet 4     Sig: TAKE 1 TABLET BY MOUTH  DAILY

## 2023-02-06 DIAGNOSIS — I10 ESSENTIAL HYPERTENSION: ICD-10-CM

## 2023-02-07 RX ORDER — HYDROCHLOROTHIAZIDE 12.5 MG/1
12.5 TABLET ORAL DAILY
Qty: 90 TABLET | Refills: 3 | Status: SHIPPED | OUTPATIENT
Start: 2023-02-07

## 2023-02-07 RX ORDER — LOSARTAN POTASSIUM 50 MG/1
50 TABLET ORAL DAILY
Qty: 90 TABLET | Refills: 3 | Status: SHIPPED | OUTPATIENT
Start: 2023-02-07

## 2023-03-12 NOTE — PROGRESS NOTES
Mr. Katy Pendleton is a 74 y.o. male who is here for evaluation of   Chief Complaint   Patient presents with    Annual Wellness Visit    Hypertension     Routine F/U. States has white coat syndrome  Checks BP at home with an arm cuff    Thyroid Problem     Is followed by Dr Vero Cheng. Next appt in June    Labs     Routine yearly labs. Rash     Reports rash that appeared over the past 3 months. States only on chest. States red bumps. Is not raised. Itches at times. Denies any new soaps. detergents. Denies rash on arms/legs. Immunization/Injection     VIIS verified. .       ASSESSMENT AND PLAN:    1. Medicare annual wellness visit, subsequent  2. Type 2 diabetes mellitus without complication, without long-term current use of insulin (HCC)  - MICROALBUMIN, UR, RAND W/ MICROALB/CREAT RATIO; Future  - LIPID PANEL; Future  -  DIABETES FOOT EXAM  - HEMOGLOBIN A1C WITH EAG; Future    3. Essential hypertension  Suboptimal control. Discontinue losartan and HCTZ and begin olmesartan at bedtime and chlorthalidone in the morning with potassium. Reassess in 6 weeks  - CBC WITH AUTOMATED DIFF; Future  - METABOLIC PANEL, COMPREHENSIVE; Future  - LIPID PANEL; Future    4. Subclinical hyperthyroidism  Checking labs today given the rash which appears to be in a photosensitive distribution. Suspect a cutaneous source possibly an insect since his wife also has a similar rash. I doubt the methimazole or the metoprolol. He is due for an appoint with Dr Vero Cheng in 2 months. - T4 (THYROXINE); Future  - TSH 3RD GENERATION; Future    5. Nocturia  - PSA, DIAGNOSTIC (PROSTATE SPECIFIC AG);  Future      Orders Placed This Encounter    CBC WITH AUTOMATED DIFF     Standing Status:   Future     Standing Expiration Date:   4/3/2023    PROSTATE SPECIFIC AG     Standing Status:   Future     Standing Expiration Date:   4/3/2023    MICROALBUMIN, UR, RAND W/ MICROALB/CREAT RATIO     Standing Status:   Future     Standing Expiration Date: 5/6/9704    METABOLIC PANEL, COMPREHENSIVE     Standing Status:   Future     Standing Expiration Date:   4/3/2023    LIPID PANEL     Standing Status:   Future     Standing Expiration Date:   4/3/2023    HEMOGLOBIN A1C WITH EAG     Standing Status:   Future     Standing Expiration Date:   4/3/2023    T4 (THYROXINE)     Standing Status:   Future     Standing Expiration Date:   4/3/2023    TSH 3RD GENERATION     Standing Status:   Future     Standing Expiration Date:   4/3/2023     DIABETES FOOT EXAM           HPI  49-year-old gentleman arrives for subsequent annual wellness visit in addition to evaluation and management of other chronic medical problems including hypertension, hyperlipidemia, and subclinical hyperthyroidism. For several years he is noted to have a suppressed TSH and there is a very positive family history for thyroid disease. He is being followed by Dr Beth Sloan and has been placed on methimazole in the past.  He is due for labs. Hypertension-over the past 3 office visits his blood pressure has been borderline at best with a systolic pressure of 658 or higher. He is currently on losartan and hydrochlorothiazide. Diabetes-he is due for an A1c. Presently diet controlled with an A1c last year of 6.1     ROS:  Denies fever, chills, cough, chest pain, SOB,  nausea, vomiting, or diarrhea. Denies wt loss, wt gain, hemoptysis, hematochezia or melena. Physical Examination:    Visit Vitals  BP (!) 150/62 (BP 1 Location: Left upper arm, BP Patient Position: Sitting, BP Cuff Size: Adult long)   Pulse 72   Resp 18   Ht 5' 11\" (1.803 m)   Wt 190 lb 6.4 oz (86.4 kg)   SpO2 98%   BMI 26.56 kg/m²      General:  Alert, cooperative, no distress. Head:  Normocephalic, without obvious abnormality, atraumatic. Eyes:  Conjunctivae/corneas clear. Pupils equal, round, reactive to light. Extraocular movements intact. Lungs:   Clear to auscultation bilaterally. Chest wall:  No tenderness or deformity. Cardiac:  RRR   Abdomen:   Soft, non-tender. Bowel sounds normal. No masses. No organomegaly. Extremities: Extremities normal, atraumatic, no cyanosis or edema. Pulses: 2+ and symmetric all extremities. Skin:                  Lymph nodes: Cervical, supraclavicular, and axillary nodes normal.   Neurologic: CNII-XII intact. Normal strength, sensation, and reflexes throughout. Diabetic Foot Exam:  Both feet are examined and demonstrate intact DP pulses. There is good capillary refill and sensation is intact. Skin is intact and there are no ulcers or sores. There is a small area of athlete's foot between the fourth and fifth toe on the right. On this date 03/13/2023 I have spent 30 minutes reviewing previous notes, test results and face to face with the patient discussing the diagnosis and importance of compliance with the treatment plan as well as documenting on the day of the visit.     Gatito Lopez MD FACP    (signed electronically) on 3/13/2023 at 7:52 AM

## 2023-03-13 ENCOUNTER — OFFICE VISIT (OUTPATIENT)
Dept: FAMILY MEDICINE CLINIC | Age: 73
End: 2023-03-13
Payer: MEDICARE

## 2023-03-13 VITALS
DIASTOLIC BLOOD PRESSURE: 62 MMHG | BODY MASS INDEX: 26.65 KG/M2 | OXYGEN SATURATION: 98 % | WEIGHT: 190.4 LBS | RESPIRATION RATE: 18 BRPM | SYSTOLIC BLOOD PRESSURE: 150 MMHG | HEIGHT: 71 IN | HEART RATE: 72 BPM

## 2023-03-13 DIAGNOSIS — R35.1 NOCTURIA: ICD-10-CM

## 2023-03-13 DIAGNOSIS — I10 ESSENTIAL HYPERTENSION: ICD-10-CM

## 2023-03-13 DIAGNOSIS — Z00.00 MEDICARE ANNUAL WELLNESS VISIT, SUBSEQUENT: Primary | ICD-10-CM

## 2023-03-13 DIAGNOSIS — E05.90 SUBCLINICAL HYPERTHYROIDISM: ICD-10-CM

## 2023-03-13 DIAGNOSIS — E11.9 TYPE 2 DIABETES MELLITUS WITHOUT COMPLICATION, WITHOUT LONG-TERM CURRENT USE OF INSULIN (HCC): ICD-10-CM

## 2023-03-13 PROCEDURE — G8417 CALC BMI ABV UP PARAM F/U: HCPCS | Performed by: INTERNAL MEDICINE

## 2023-03-13 PROCEDURE — 1101F PT FALLS ASSESS-DOCD LE1/YR: CPT | Performed by: INTERNAL MEDICINE

## 2023-03-13 PROCEDURE — 2022F DILAT RTA XM EVC RTNOPTHY: CPT | Performed by: INTERNAL MEDICINE

## 2023-03-13 PROCEDURE — 3017F COLORECTAL CA SCREEN DOC REV: CPT | Performed by: INTERNAL MEDICINE

## 2023-03-13 PROCEDURE — 1123F ACP DISCUSS/DSCN MKR DOCD: CPT | Performed by: INTERNAL MEDICINE

## 2023-03-13 PROCEDURE — G8427 DOCREV CUR MEDS BY ELIG CLIN: HCPCS | Performed by: INTERNAL MEDICINE

## 2023-03-13 PROCEDURE — 99214 OFFICE O/P EST MOD 30 MIN: CPT | Performed by: INTERNAL MEDICINE

## 2023-03-13 PROCEDURE — G0439 PPPS, SUBSEQ VISIT: HCPCS | Performed by: INTERNAL MEDICINE

## 2023-03-13 PROCEDURE — 3046F HEMOGLOBIN A1C LEVEL >9.0%: CPT | Performed by: INTERNAL MEDICINE

## 2023-03-13 PROCEDURE — G8536 NO DOC ELDER MAL SCRN: HCPCS | Performed by: INTERNAL MEDICINE

## 2023-03-13 PROCEDURE — G8510 SCR DEP NEG, NO PLAN REQD: HCPCS | Performed by: INTERNAL MEDICINE

## 2023-03-13 PROCEDURE — 3078F DIAST BP <80 MM HG: CPT | Performed by: INTERNAL MEDICINE

## 2023-03-13 PROCEDURE — 3077F SYST BP >= 140 MM HG: CPT | Performed by: INTERNAL MEDICINE

## 2023-03-13 RX ORDER — POTASSIUM CHLORIDE 750 MG/1
10 TABLET, EXTENDED RELEASE ORAL DAILY
Qty: 90 TABLET | Refills: 4 | Status: SHIPPED | OUTPATIENT
Start: 2023-03-13

## 2023-03-13 RX ORDER — OLMESARTAN MEDOXOMIL 20 MG/1
20 TABLET ORAL
Qty: 90 TABLET | Refills: 4 | Status: SHIPPED | OUTPATIENT
Start: 2023-03-13

## 2023-03-13 RX ORDER — CHLORTHALIDONE 25 MG/1
25 TABLET ORAL
Qty: 90 TABLET | Refills: 4 | Status: SHIPPED | OUTPATIENT
Start: 2023-03-13

## 2023-03-13 NOTE — PATIENT INSTRUCTIONS
Medicare Wellness Visit, Male    The best way to live healthy is to have a lifestyle where you eat a well-balanced diet, exercise regularly, limit alcohol use, and quit all forms of tobacco/nicotine, if applicable. Regular preventive services are another way to keep healthy. Preventive services (vaccines, screening tests, monitoring & exams) can help personalize your care plan, which helps you manage your own care. Screening tests can find health problems at the earliest stages, when they are easiest to treat. [facility] follows the current, evidence-based guidelines published by the [facility] ([facility]) when recommending preventive services for our patients. Because we follow these guidelines, sometimes recommendations change over time as research supports it. (For example, a prostate screening blood test is no longer routinely recommended for men with no symptoms). Of course, you and your doctor may decide to screen more often for some diseases, based on your risk and co-morbidities (chronic disease you are already diagnosed with). Preventive services for you include:  - Medicare offers their members a free annual wellness visit, which is time for you and your primary care provider to discuss and plan for your preventive service needs.  Take advantage of this benefit every year!    -Over the age of 72 should receive the recommended pneumonia vaccines.   -All adults should have a flu vaccine yearly.  -All adults should receive a tetanus vaccine every 10 years.  -Over the age of 48 should receive the shingles vaccines.    -All adults should be screened once for Hepatitis C.  -All adults age 38-68 who are overweight should have a diabetes screening test once every three years.   -Other screening tests & preventive services for persons with diabetes include: an eye exam to screen for diabetic retinopathy, a kidney function test, a foot exam, and stricter control over your cholesterol.   -Cardiovascular screening for adults with routine risk involves an electrocardiogram (ECG) at intervals determined by the provider.     -Colorectal cancer screening should be done for adults age 43-69 with no increased risk factors for colorectal cancer. There are a number of acceptable methods of screening for this type of cancer. Each test has its own benefits and drawbacks. Discuss with your provider what is most appropriate for you during your annual wellness visit. The different tests include: colonoscopy (considered the best screening method), a fecal occult blood test, a fecal DNA test, and sigmoidoscopy.    -Lung cancer screening is recommended annually with a low dose CT scan for adults between age 54 and 68, who have smoked at least 30 pack years (equivalent of 1 pack per day for 30 days), and who is a current smoker or quit less than 15 years ago. -An Abdominal Aortic Aneurysm (AAA) Screening is recommended for men age 73-68 who has ever smoked in their lifetime.      Here is a list of your current Health Maintenance items (your personalized list of preventive services) with a due date:  Health Maintenance Due   Topic Date Due    URINE CHECK FOR KIDNEY PROBLEMS  02/22/2022    Kidney Testing  03/24/2023    Depresssion Screening  03/24/2023    Diabetic Foot Care  03/24/2023    Hemoglobin A1C    03/24/2023    Cholesterol Test   03/24/2023

## 2023-03-13 NOTE — PROGRESS NOTES
Jennifer Ramírez is a 74 y.o. male presenting for/with:    Chief Complaint   Patient presents with    Annual Wellness Visit    Hypertension     Routine F/U. States has white coat syndrome  Checks BP at home with an arm cuff    Thyroid Problem     Is followed by Dr Carrie Shaffer. Next appt in June    Labs     Routine yearly labs. Rash     Reports rash that appeared over the past 3 months. States only on chest. States red bumps. Is not raised. Itches at times. Denies any new soaps. detergents. Denies rash on arms/legs. Immunization/Injection     VIIS verified. Visit Vitals  BP (!) 150/62 (BP 1 Location: Left upper arm, BP Patient Position: Sitting, BP Cuff Size: Adult long)   Pulse 72   Resp 18   Ht 5' 11\" (1.803 m)   Wt 190 lb 6.4 oz (86.4 kg)   SpO2 98%   BMI 26.56 kg/m²     Pain Scale: 0 - No pain/10  Pain Location:     1. \"Have you been to the ER, urgent care clinic since your last visit? Hospitalized since your last visit? \" No    2. \"Have you seen or consulted any other health care providers outside of the [address] [place] [address] since your last visit? \" No     3. For patients aged 39-70: Has the patient had a colonoscopy / FIT/ Cologuard? Yes - no Care Gap present      If the patient is female:    4. For patients aged 41-77: Has the patient had a mammogram within the past 2 years? NA - based on age or sex      11. For patients aged 21-65: Has the patient had a pap smear? NA - based on age or sex      Learning Assessment 3/13/2023   PRIMARY LEARNER Patient   PRIMARY LANGUAGE ENGLISH   LEARNER PREFERENCE PRIMARY VIDEOS   ANSWERED BY self   RELATIONSHIP SELF     Fall Risk Assessment, last 12 mths 3/13/2023   Able to walk? Yes   Fall in past 12 months? 0   Do you feel unsteady?  0   Are you worried about falling 0       3 most recent PHQ Screens 3/13/2023   Little interest or pleasure in doing things Not at all   Feeling down, depressed, irritable, or hopeless Not at all   Total Score PHQ 2 0     Abuse Screening Questionnaire 3/13/2023   Do you ever feel afraid of your partner? N   Are you in a relationship with someone who physically or mentally threatens you? N   Is it safe for you to go home? Y       ADL Assessment 3/13/2023   Feeding yourself No Help Needed   Getting from bed to chair No Help Needed   Getting dressed No Help Needed   Bathing or showering No Help Needed   Walk across the room (includes cane/walker) No Help Needed   Using the telphone No Help Needed   Taking your medications No Help Needed   Preparing meals No Help Needed   Managing money (expenses/bills) No Help Needed   Moderately strenuous housework (laundry) No Help Needed   Shopping for personal items (toiletries/medicines) No Help Needed   Shopping for groceries No Help Needed   Driving No Help Needed   Climbing a flight of stairs No Help Needed   Getting to places beyond walking distances No Help Needed      Advance Care Planning 10/11/2021   Patient's Healthcare Decision Maker is: Legal Next of Kin   Confirm Advance Directive None   Patient Would Like to Complete Advance Directive No      This is the Subsequent Medicare Annual Wellness Exam, performed 12 months or more after the Initial AWV or the last Subsequent AWV    I have reviewed the patient's medical history in detail and updated the computerized patient record. Assessment/Plan   Education and counseling provided:  Are appropriate based on today's review and evaluation    1. Medicare annual wellness visit, subsequent  2. Type 2 diabetes mellitus without complication, without long-term current use of insulin ([facility] [address]  3. Essential hypertension  4. Subclinical hyperthyroidism  5.  Nocturia       Depression Risk Factor Screening     3 most recent PHQ Screens 3/13/2023   Little interest or pleasure in doing things Not at all   Feeling down, depressed, irritable, or hopeless Not at all   Total Score PHQ 2 0       Alcohol & Drug Abuse Risk Screen    Do you average more than 1 drink per night or more than 7 drinks a week: No    In the past three months have you have had more than 4 drinks containing alcohol on one occasion: No          Functional Ability and Level of Safety    Hearing: Hearing is good. Activities of Daily Living: The home contains: no safety equipment. Patient does total self care      Ambulation: with no difficulty     Fall Risk:  Fall Risk Assessment, last 12 mths 3/13/2023   Able to walk? Yes   Fall in past 12 months? 0   Do you feel unsteady?  0   Are you worried about falling 0      Abuse Screen:  Patient is not abused       Cognitive Screening    Has your family/caregiver stated any concerns about your memory: no     Health Maintenance Due     Health Maintenance Due   Topic Date Due    Diabetic Alb to Cr ratio (uACR) test  02/22/2022    GFR test (Diabetes, CKD 3-4, OR last GFR 15-59)  03/24/2023    Depression Screen  03/24/2023    Foot Exam Q1  03/24/2023    A1C test (Diabetic or Prediabetic)  03/24/2023    Lipid Screen  03/24/2023       Patient Care Team   Patient Care Team:  Corey Gutiérrez MD as PCP - General (Internal Medicine Physician)  Corey Gutiérrez MD as PCP - [facility] Empaneled Provider  Jamison Gonzalez MD as Consulting Provider (Endocrinology Physician)    History     Patient Active Problem List   Diagnosis Code    HTN (hypertension) I10    Hyperlipidemia E78.5    Elevated liver enzymes R74.8    Low TSH level R79.89    Subclinical hyperthyroidism E05.90    DM2 (diabetes mellitus, type 2) ([facility] [address] E11.9     Past Medical History:   Diagnosis Date    Conductive hearing loss, middle ear     DM2 (diabetes mellitus, type 2) ([facility] [address] 10/19/2016    resolved after weight loss    Elevated liver enzymes     HTN (hypertension)     resolved after weight loss 08/18    Hyperlipidemia     Subclinical hyperthyroidism     Tinnitus       Past Surgical History:   Procedure Laterality Date    COLONOSCOPY N/A 9/20/2018    COLONOSCOPY performed by Juan Traore MD at [facility] ENDOSCOPY    HX COLONOSCOPY      HX MOHS PROCEDURES Left     also right shoulder surgery Ca deposits spurs and rotator cuff lt     Current Outpatient Medications   Medication Sig Dispense Refill    losartan (COZAAR) 50 mg tablet TAKE 1 TABLET BY MOUTH DAILY 90 Tablet 3    hydroCHLOROthiazide (HYDRODIURIL) 12.5 mg tablet TAKE 1 TABLET BY MOUTH DAILY 90 Tablet 3    metoprolol succinate (TOPROL-XL) 25 mg XL tablet Take 1 Tablet by mouth daily. 90 Tablet 4    methIMAzole (TAPAZOLE) 5 mg tablet TAKE 1/2 TABLET BY MOUTH  EVERY OTHER DAY 45 Tablet 4    atorvastatin (LIPITOR) 40 mg tablet TAKE 1 TABLET BY MOUTH  DAILY 90 Tablet 4    vit A/vit C/vit E/zinc/copper (PRESERVISION AREDS PO) Take  by mouth. ascorbic acid, vitamin C, (VITAMIN C) 500 mg tablet Take 1,000 mg by mouth daily. cholecalciferol, vitamin D3, 50 mcg (2,000 unit) tab Take  by mouth. omega-3 fatty acids-vitamin e 1,000 mg cap Take 1 Capsule by mouth daily.        Allergies   Allergen Reactions    Ace Inhibitors Cough    Amlodipine Not Reported This Time    Claritin [Loratadine] Hives    Pcn [Penicillins] Not Reported This Time       Family History   Problem Relation Age of Onset    Alzheimer's Disease Mother     Cancer Father         smoker, throat cancer    Cancer Brother         pancreatic    OSTEOARTHRITIS Brother     No Known Problems Maternal Grandmother     Pneumonia Maternal Grandfather     No Known Problems Paternal Grandmother     No Known Problems Paternal Grandfather     No Known Problems Brother      Social History     Tobacco Use    Smoking status: Former     Packs/day: 2.00     Years: 25.00     Pack years: 50.00     Types: Cigarettes     Start date:      Quit date: 1988     Years since quittin.3    Smokeless tobacco: Never   Substance Use Topics    Alcohol use: No         Hue Duncan

## 2023-03-14 LAB
ALBUMIN SERPL-MCNC: 4.2 G/DL (ref 3.5–5)
ALBUMIN/GLOB SERPL: 1.3 (ref 1.1–2.2)
ALP SERPL-CCNC: 59 U/L (ref 45–117)
ALT SERPL-CCNC: 31 U/L (ref 12–78)
ANION GAP SERPL CALC-SCNC: 4 MMOL/L (ref 5–15)
AST SERPL-CCNC: 26 U/L (ref 15–37)
BASOPHILS # BLD: 0 K/UL (ref 0–0.1)
BASOPHILS NFR BLD: 0 % (ref 0–1)
BILIRUB SERPL-MCNC: 0.8 MG/DL (ref 0.2–1)
BUN SERPL-MCNC: 18 MG/DL (ref 6–20)
BUN/CREAT SERPL: 18 (ref 12–20)
CALCIUM SERPL-MCNC: 9.4 MG/DL (ref 8.5–10.1)
CHLORIDE SERPL-SCNC: 105 MMOL/L (ref 97–108)
CHOLEST SERPL-MCNC: 143 MG/DL
CO2 SERPL-SCNC: 31 MMOL/L (ref 21–32)
CREAT SERPL-MCNC: 0.99 MG/DL (ref 0.7–1.3)
CREAT UR-MCNC: 78 MG/DL
DIFFERENTIAL METHOD BLD: NORMAL
EOSINOPHIL # BLD: 0.2 K/UL (ref 0–0.4)
EOSINOPHIL NFR BLD: 2 % (ref 0–7)
ERYTHROCYTE [DISTWIDTH] IN BLOOD BY AUTOMATED COUNT: 12.9 % (ref 11.5–14.5)
EST. AVERAGE GLUCOSE BLD GHB EST-MCNC: 134 MG/DL
GLOBULIN SER CALC-MCNC: 3.2 G/DL (ref 2–4)
GLUCOSE SERPL-MCNC: 154 MG/DL (ref 65–100)
HBA1C MFR BLD: 6.3 % (ref 4–5.6)
HCT VFR BLD AUTO: 47.5 % (ref 36.6–50.3)
HDLC SERPL-MCNC: 62 MG/DL
HDLC SERPL: 2.3 (ref 0–5)
HGB BLD-MCNC: 14.6 G/DL (ref 12.1–17)
IMM GRANULOCYTES # BLD AUTO: 0 K/UL (ref 0–0.04)
IMM GRANULOCYTES NFR BLD AUTO: 0 % (ref 0–0.5)
LDLC SERPL CALC-MCNC: 65.2 MG/DL (ref 0–100)
LYMPHOCYTES # BLD: 1.8 K/UL (ref 0.8–3.5)
LYMPHOCYTES NFR BLD: 23 % (ref 12–49)
MCH RBC QN AUTO: 28.1 PG (ref 26–34)
MCHC RBC AUTO-ENTMCNC: 30.7 G/DL (ref 30–36.5)
MCV RBC AUTO: 91.5 FL (ref 80–99)
MICROALBUMIN UR-MCNC: 0.73 MG/DL
MICROALBUMIN/CREAT UR-RTO: 9 MG/G (ref 0–30)
MONOCYTES # BLD: 0.6 K/UL (ref 0–1)
MONOCYTES NFR BLD: 8 % (ref 5–13)
NEUTS SEG # BLD: 5.3 K/UL (ref 1.8–8)
NEUTS SEG NFR BLD: 67 % (ref 32–75)
NRBC # BLD: 0 K/UL (ref 0–0.01)
NRBC BLD-RTO: 0 PER 100 WBC
PLATELET # BLD AUTO: 281 K/UL (ref 150–400)
PMV BLD AUTO: 9.5 FL (ref 8.9–12.9)
POTASSIUM SERPL-SCNC: 4.4 MMOL/L (ref 3.5–5.1)
PROT SERPL-MCNC: 7.4 G/DL (ref 6.4–8.2)
PSA SERPL-MCNC: 0.9 NG/ML (ref 0.01–4)
RBC # BLD AUTO: 5.19 M/UL (ref 4.1–5.7)
SODIUM SERPL-SCNC: 140 MMOL/L (ref 136–145)
T4 SERPL-MCNC: 11.5 UG/DL (ref 4.5–12.1)
TRIGL SERPL-MCNC: 79 MG/DL (ref ?–150)
TSH SERPL DL<=0.05 MIU/L-ACNC: 0.45 UIU/ML (ref 0.36–3.74)
VLDLC SERPL CALC-MCNC: 15.8 MG/DL
WBC # BLD AUTO: 7.9 K/UL (ref 4.1–11.1)

## 2023-04-01 DIAGNOSIS — E05.90 SUBCLINICAL HYPERTHYROIDISM: ICD-10-CM

## 2023-04-03 ENCOUNTER — HOSPITAL ENCOUNTER (OUTPATIENT)
Dept: ULTRASOUND IMAGING | Age: 73
End: 2023-04-03
Attending: INTERNAL MEDICINE
Payer: MEDICARE

## 2023-04-03 DIAGNOSIS — E04.2 MULTINODULAR GOITER: ICD-10-CM

## 2023-04-03 PROCEDURE — 76536 US EXAM OF HEAD AND NECK: CPT

## 2023-04-24 ENCOUNTER — OFFICE VISIT (OUTPATIENT)
Dept: FAMILY MEDICINE CLINIC | Age: 73
End: 2023-04-24
Payer: MEDICARE

## 2023-04-24 VITALS
HEART RATE: 63 BPM | RESPIRATION RATE: 18 BRPM | WEIGHT: 190.8 LBS | DIASTOLIC BLOOD PRESSURE: 60 MMHG | TEMPERATURE: 97.8 F | HEIGHT: 71 IN | BODY MASS INDEX: 26.71 KG/M2 | SYSTOLIC BLOOD PRESSURE: 138 MMHG | OXYGEN SATURATION: 97 %

## 2023-04-24 DIAGNOSIS — I10 ESSENTIAL HYPERTENSION: Primary | ICD-10-CM

## 2023-04-24 PROCEDURE — G8417 CALC BMI ABV UP PARAM F/U: HCPCS | Performed by: INTERNAL MEDICINE

## 2023-04-24 PROCEDURE — 1101F PT FALLS ASSESS-DOCD LE1/YR: CPT | Performed by: INTERNAL MEDICINE

## 2023-04-24 PROCEDURE — G8536 NO DOC ELDER MAL SCRN: HCPCS | Performed by: INTERNAL MEDICINE

## 2023-04-24 PROCEDURE — 3078F DIAST BP <80 MM HG: CPT | Performed by: INTERNAL MEDICINE

## 2023-04-24 PROCEDURE — G8510 SCR DEP NEG, NO PLAN REQD: HCPCS | Performed by: INTERNAL MEDICINE

## 2023-04-24 PROCEDURE — 3075F SYST BP GE 130 - 139MM HG: CPT | Performed by: INTERNAL MEDICINE

## 2023-04-24 PROCEDURE — G8427 DOCREV CUR MEDS BY ELIG CLIN: HCPCS | Performed by: INTERNAL MEDICINE

## 2023-04-24 PROCEDURE — 1123F ACP DISCUSS/DSCN MKR DOCD: CPT | Performed by: INTERNAL MEDICINE

## 2023-04-24 PROCEDURE — 99213 OFFICE O/P EST LOW 20 MIN: CPT | Performed by: INTERNAL MEDICINE

## 2023-04-24 PROCEDURE — 3017F COLORECTAL CA SCREEN DOC REV: CPT | Performed by: INTERNAL MEDICINE

## 2023-04-24 NOTE — PROGRESS NOTES
Opal Lozada is a 74 y.o. male presenting for/with:    Chief Complaint   Patient presents with    Hypertension     Follow up. Pt stated that he checks his blood pressure at home and has brought in his readings for the PCP to review. Diabetes     Follow up       Visit Vitals  BP (!) 160/70 (BP 1 Location: Left arm, BP Patient Position: Sitting, BP Cuff Size: Adult long)   Pulse 63   Temp 97.8 °F (36.6 °C) (Temporal)   Resp 18   Ht 5' 11\" (1.803 m)   Wt 190 lb 12.8 oz (86.5 kg)   SpO2 97%   BMI 26.61 kg/m²     Pain Scale: 0 - No pain/10  Pain Location:     1. \"Have you been to the ER, urgent care clinic since your last visit? Hospitalized since your last visit? \" No    2. \"Have you seen or consulted any other health care providers outside of the [address] [place] [address] since your last visit? \" No     3. For patients aged 39-70: Has the patient had a colonoscopy / FIT/ Cologuard? Yes - no Care Gap present      If the patient is female:    4. For patients aged 41-77: Has the patient had a mammogram within the past 2 years? NA - based on age or sex      11. For patients aged 21-65: Has the patient had a pap smear? NA - based on age or sex      Learning Assessment 3/13/2023   PRIMARY LEARNER Patient   PRIMARY LANGUAGE ENGLISH   LEARNER PREFERENCE PRIMARY VIDEOS   ANSWERED BY self   RELATIONSHIP SELF     Fall Risk Assessment, last 12 mths 4/24/2023   Able to walk? Yes   Fall in past 12 months? 0   Do you feel unsteady? 0   Are you worried about falling 0       3 most recent PHQ Screens 4/24/2023   Little interest or pleasure in doing things Not at all   Feeling down, depressed, irritable, or hopeless Not at all   Total Score PHQ 2 0     Abuse Screening Questionnaire 4/24/2023   Do you ever feel afraid of your partner? N   Are you in a relationship with someone who physically or mentally threatens you? N   Is it safe for you to go home?  Y       ADL Assessment 4/24/2023   Feeding yourself No Help Needed   Getting from bed to chair No Help Needed   Getting dressed No Help Needed   Bathing or showering No Help Needed   Walk across the room (includes cane/walker) No Help Needed   Using the telphone No Help Needed   Taking your medications No Help Needed   Preparing meals No Help Needed   Managing money (expenses/bills) No Help Needed   Moderately strenuous housework (laundry) No Help Needed   Shopping for personal items (toiletries/medicines) No Help Needed   Shopping for groceries No Help Needed   Driving No Help Needed   Climbing a flight of stairs No Help Needed   Getting to places beyond walking distances No Help Needed      Advance Care Planning 10/11/2021   Patient's Healthcare Decision Maker is: Legal Next of Kin   Confirm Advance Directive None   Patient Would Like to Complete Advance Directive No

## 2023-04-24 NOTE — PROGRESS NOTES
Chief Complaint   Patient presents with    Hypertension     Follow up. Pt stated that he checks his blood pressure at home and has brought in his readings for the PCP to review. Diabetes     Follow up       ASSESSMENT AND PLAN:    1. Essential hypertension  Only at goal.  Check BUN, creatinine and potassium. Follow-up 6 months  - METABOLIC PANEL, BASIC; Future  - METABOLIC PANEL, BASIC      Orders Placed This Encounter    METABOLIC PANEL, BASIC     Standing Status:   Future     Number of Occurrences:   1     Standing Expiration Date:   5/24/2023       Maame Mensah MD, FACP      HPI:         is a 74 y.o. male who arrives for reevaluation of HTN. Home blood pressures are excellent. Every time he goes to the dentist for the doctor's office the pressures are high. I seated him at the end of the table with the arm at the level of the heart palm up and repeat his blood pressure at 138/60. Allergies   Allergen Reactions    Ace Inhibitors Cough    Amlodipine Not Reported This Time    Claritin [Loratadine] Hives    Pcn [Penicillins] Not Reported This Time       Current Outpatient Medications   Medication Sig    olmesartan (BENICAR) 20 mg tablet Take 1 Tablet by mouth nightly. Indications: high blood pressure    chlorthalidone (HYGROTON) 25 mg tablet Take 1 Tablet by mouth daily (with breakfast). potassium chloride (KLOR-CON M10) 10 mEq tablet Take 1 Tablet by mouth daily. metoprolol succinate (TOPROL-XL) 25 mg XL tablet Take 1 Tablet by mouth daily. methIMAzole (TAPAZOLE) 5 mg tablet TAKE 1/2 TABLET BY MOUTH  EVERY OTHER DAY    atorvastatin (LIPITOR) 40 mg tablet TAKE 1 TABLET BY MOUTH  DAILY    vit A/vit C/vit E/zinc/copper (PRESERVISION AREDS PO) Take  by mouth. ascorbic acid, vitamin C, (VITAMIN C) 500 mg tablet Take 2 Tablets by mouth daily. cholecalciferol, vitamin D3, 50 mcg (2,000 unit) tab Take  by mouth.     omega-3 fatty acids-vitamin e 1,000 mg cap Take 1 Capsule by mouth daily. No current facility-administered medications for this visit. Past Medical History:   Diagnosis Date    Conductive hearing loss, middle ear     DM2 (diabetes mellitus, type 2) ([facility] [address] 10/19/2016    resolved after weight loss    Elevated liver enzymes     HTN (hypertension)     resolved after weight loss 08/18    Hyperlipidemia     Subclinical hyperthyroidism     Tinnitus          ROS:  Denies fever, chills, cough, chest pain, SOB,  nausea, vomiting, or diarrhea. Denies wt loss, wt gain, hemoptysis, hematochezia or melena. Physical Examination:    Visit Vitals  /60   Pulse 63   Temp 97.8 °F (36.6 °C) (Temporal)   Resp 18   Ht 5' 11\" (1.803 m)   Wt 190 lb 12.8 oz (86.5 kg)   SpO2 97%   BMI 26.61 kg/m²      General:  Alert, cooperative, no distress. Head:  Normocephalic, without obvious abnormality, atraumatic. Eyes:  Conjunctivae/corneas clear. Pupils equal, round, reactive to light. Chest: No wheezes, rales. Rubs or ronchi   Cardiac: RRR.  No peripheral edema     Skin: No rash

## 2023-04-25 LAB
ANION GAP SERPL CALC-SCNC: 3 MMOL/L (ref 5–15)
BUN SERPL-MCNC: 25 MG/DL (ref 6–20)
BUN/CREAT SERPL: 23 (ref 12–20)
CALCIUM SERPL-MCNC: 9.3 MG/DL (ref 8.5–10.1)
CHLORIDE SERPL-SCNC: 102 MMOL/L (ref 97–108)
CO2 SERPL-SCNC: 33 MMOL/L (ref 21–32)
CREAT SERPL-MCNC: 1.07 MG/DL (ref 0.7–1.3)
GLUCOSE SERPL-MCNC: 188 MG/DL (ref 65–100)
POTASSIUM SERPL-SCNC: 3.8 MMOL/L (ref 3.5–5.1)
SODIUM SERPL-SCNC: 138 MMOL/L (ref 136–145)

## 2023-05-17 RX ORDER — OLMESARTAN MEDOXOMIL 20 MG/1
20 TABLET ORAL
COMMUNITY
Start: 2023-03-13

## 2023-05-17 RX ORDER — METHIMAZOLE 5 MG/1
TABLET ORAL
COMMUNITY
Start: 2022-12-15

## 2023-05-17 RX ORDER — METOPROLOL SUCCINATE 25 MG/1
25 TABLET, EXTENDED RELEASE ORAL DAILY
COMMUNITY
Start: 2022-12-15

## 2023-05-17 RX ORDER — POTASSIUM CHLORIDE 750 MG/1
10 TABLET, EXTENDED RELEASE ORAL DAILY
COMMUNITY
Start: 2023-03-13

## 2023-05-17 RX ORDER — ASCORBIC ACID 500 MG
1000 TABLET ORAL DAILY
COMMUNITY

## 2023-05-17 RX ORDER — CHLORTHALIDONE 25 MG/1
25 TABLET ORAL
COMMUNITY
Start: 2023-03-13

## 2023-05-17 RX ORDER — ATORVASTATIN CALCIUM 40 MG/1
1 TABLET, FILM COATED ORAL DAILY
COMMUNITY
Start: 2022-12-15

## 2023-06-27 ENCOUNTER — OFFICE VISIT (OUTPATIENT)
Age: 73
End: 2023-06-27
Payer: MEDICARE

## 2023-06-27 VITALS
HEART RATE: 70 BPM | BODY MASS INDEX: 26.96 KG/M2 | WEIGHT: 192.6 LBS | DIASTOLIC BLOOD PRESSURE: 59 MMHG | HEIGHT: 71 IN | SYSTOLIC BLOOD PRESSURE: 136 MMHG

## 2023-06-27 DIAGNOSIS — E05.90 SUBCLINICAL HYPERTHYROIDISM: Primary | ICD-10-CM

## 2023-06-27 DIAGNOSIS — E04.2 MULTINODULAR GOITER: ICD-10-CM

## 2023-06-27 PROCEDURE — G8419 CALC BMI OUT NRM PARAM NOF/U: HCPCS | Performed by: INTERNAL MEDICINE

## 2023-06-27 PROCEDURE — 3075F SYST BP GE 130 - 139MM HG: CPT | Performed by: INTERNAL MEDICINE

## 2023-06-27 PROCEDURE — 1036F TOBACCO NON-USER: CPT | Performed by: INTERNAL MEDICINE

## 2023-06-27 PROCEDURE — G8427 DOCREV CUR MEDS BY ELIG CLIN: HCPCS | Performed by: INTERNAL MEDICINE

## 2023-06-27 PROCEDURE — 3078F DIAST BP <80 MM HG: CPT | Performed by: INTERNAL MEDICINE

## 2023-06-27 PROCEDURE — 1123F ACP DISCUSS/DSCN MKR DOCD: CPT | Performed by: INTERNAL MEDICINE

## 2023-06-27 PROCEDURE — 3017F COLORECTAL CA SCREEN DOC REV: CPT | Performed by: INTERNAL MEDICINE

## 2023-06-27 PROCEDURE — 99214 OFFICE O/P EST MOD 30 MIN: CPT | Performed by: INTERNAL MEDICINE

## 2023-07-07 ENCOUNTER — HOSPITAL ENCOUNTER (OUTPATIENT)
Facility: HOSPITAL | Age: 73
End: 2023-07-07
Payer: MEDICARE

## 2023-07-07 ENCOUNTER — OFFICE VISIT (OUTPATIENT)
Age: 73
End: 2023-07-07
Payer: MEDICARE

## 2023-07-07 VITALS
DIASTOLIC BLOOD PRESSURE: 78 MMHG | WEIGHT: 190 LBS | OXYGEN SATURATION: 97 % | RESPIRATION RATE: 18 BRPM | SYSTOLIC BLOOD PRESSURE: 148 MMHG | HEIGHT: 71 IN | TEMPERATURE: 97.8 F | BODY MASS INDEX: 26.6 KG/M2 | HEART RATE: 68 BPM

## 2023-07-07 DIAGNOSIS — M54.50 ACUTE LEFT-SIDED LOW BACK PAIN WITHOUT SCIATICA: ICD-10-CM

## 2023-07-07 DIAGNOSIS — E05.90 THYROTOXICOSIS WITHOUT THYROID STORM, UNSPECIFIED THYROTOXICOSIS TYPE: ICD-10-CM

## 2023-07-07 DIAGNOSIS — I10 ESSENTIAL (PRIMARY) HYPERTENSION: Primary | ICD-10-CM

## 2023-07-07 LAB
BILIRUBIN, URINE, POC: NEGATIVE
BLOOD URINE, POC: NEGATIVE
GLUCOSE URINE, POC: NEGATIVE
KETONES, URINE, POC: NEGATIVE
LEUKOCYTE ESTERASE, URINE, POC: NEGATIVE
NITRITE, URINE, POC: NEGATIVE
PH, URINE, POC: 5.5 (ref 4.6–8)
PROTEIN,URINE, POC: NEGATIVE
SPECIFIC GRAVITY, URINE, POC: 1.01 (ref 1–1.03)
URINALYSIS CLARITY, POC: CLEAR
URINALYSIS COLOR, POC: YELLOW
UROBILINOGEN, POC: NORMAL

## 2023-07-07 PROCEDURE — 72110 X-RAY EXAM L-2 SPINE 4/>VWS: CPT

## 2023-07-07 PROCEDURE — 3078F DIAST BP <80 MM HG: CPT | Performed by: INTERNAL MEDICINE

## 2023-07-07 PROCEDURE — 99214 OFFICE O/P EST MOD 30 MIN: CPT | Performed by: INTERNAL MEDICINE

## 2023-07-07 PROCEDURE — 81003 URINALYSIS AUTO W/O SCOPE: CPT | Performed by: INTERNAL MEDICINE

## 2023-07-07 PROCEDURE — 3017F COLORECTAL CA SCREEN DOC REV: CPT | Performed by: INTERNAL MEDICINE

## 2023-07-07 PROCEDURE — G8427 DOCREV CUR MEDS BY ELIG CLIN: HCPCS | Performed by: INTERNAL MEDICINE

## 2023-07-07 PROCEDURE — 1123F ACP DISCUSS/DSCN MKR DOCD: CPT | Performed by: INTERNAL MEDICINE

## 2023-07-07 PROCEDURE — 3077F SYST BP >= 140 MM HG: CPT | Performed by: INTERNAL MEDICINE

## 2023-07-07 PROCEDURE — 1036F TOBACCO NON-USER: CPT | Performed by: INTERNAL MEDICINE

## 2023-07-07 PROCEDURE — G8419 CALC BMI OUT NRM PARAM NOF/U: HCPCS | Performed by: INTERNAL MEDICINE

## 2023-07-07 SDOH — ECONOMIC STABILITY: FOOD INSECURITY: WITHIN THE PAST 12 MONTHS, THE FOOD YOU BOUGHT JUST DIDN'T LAST AND YOU DIDN'T HAVE MONEY TO GET MORE.: NEVER TRUE

## 2023-07-07 SDOH — ECONOMIC STABILITY: INCOME INSECURITY: HOW HARD IS IT FOR YOU TO PAY FOR THE VERY BASICS LIKE FOOD, HOUSING, MEDICAL CARE, AND HEATING?: NOT HARD AT ALL

## 2023-07-07 SDOH — ECONOMIC STABILITY: HOUSING INSECURITY
IN THE LAST 12 MONTHS, WAS THERE A TIME WHEN YOU DID NOT HAVE A STEADY PLACE TO SLEEP OR SLEPT IN A SHELTER (INCLUDING NOW)?: NO

## 2023-07-07 SDOH — ECONOMIC STABILITY: FOOD INSECURITY: WITHIN THE PAST 12 MONTHS, YOU WORRIED THAT YOUR FOOD WOULD RUN OUT BEFORE YOU GOT MONEY TO BUY MORE.: NEVER TRUE

## 2023-07-07 ASSESSMENT — PATIENT HEALTH QUESTIONNAIRE - PHQ9
2. FEELING DOWN, DEPRESSED OR HOPELESS: 0
SUM OF ALL RESPONSES TO PHQ QUESTIONS 1-9: 0
SUM OF ALL RESPONSES TO PHQ9 QUESTIONS 1 & 2: 0
SUM OF ALL RESPONSES TO PHQ QUESTIONS 1-9: 0
SUM OF ALL RESPONSES TO PHQ QUESTIONS 1-9: 0
1. LITTLE INTEREST OR PLEASURE IN DOING THINGS: 0
SUM OF ALL RESPONSES TO PHQ QUESTIONS 1-9: 0

## 2023-07-07 NOTE — PROGRESS NOTES
1. Essential (primary) hypertension  Likely exacerbated by recent difficulties with discomfort from his back pain. We will bring him back in about 10 days for reassessment. Duration 30 minutes primarily education and evaluation.  - CBC with Auto Differential; Future  - Basic Metabolic Panel; Future  - AMB POC URINALYSIS DIP STICK AUTO W/O MICRO    2. 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 - CBC with Auto Differential; Future  - TSH; Future  - T4, Free; Future    3. Acute left-sided low back pain without sciatica  Etiology unclear. Certain aspects of this are musculoskeletal but we cannot exclude kidney stone or renal concerns. We will check labs including urinalysis in addition to plain films. Follow-up in 10 days. - XR LUMBAR SPINE (MIN 4 VIEWS); Future  - AMB POC URINALYSIS DIP STICK AUTO W/O MICRO         Chief Complaint   Patient presents with    Dizziness     Started after put on new medication olmesartan    Back Pain     Lower left sided back pain         Orders Placed This Encounter   Procedures    XR LUMBAR SPINE (MIN 4 VIEWS)     Standing Status:   Future     Standing Expiration Date:   8/7/2023     Scheduling Instructions:      [facility]    CBC with Auto Differential     Standing Status:   Future     Standing Expiration Date:   9/0/9432    Basic Metabolic Panel     Standing Status:   Future     Standing Expiration Date:   7/7/2024    TSH     Standing Status:   Future     Standing Expiration Date:   7/7/2024    T4, Free     Standing Status:   Future     Standing Expiration Date:   7/7/2024    AMB POC URINALYSIS DIP STICK AUTO W/O MICRO       Modesta Witt MD, FACP      HPI:         is a 74 y.o. male who arrives for evaluation of back pain. Its been present for about 4 weeks and is in the left flank area. Worse with movement. Also notes occasional speckled rash on his legs which are pinpoint and red. At times dizzy.   Blood pressure has not been

## 2023-07-08 LAB
ANION GAP SERPL CALC-SCNC: 5 MMOL/L (ref 5–15)
BASOPHILS # BLD: 0 K/UL (ref 0–0.1)
BASOPHILS NFR BLD: 1 % (ref 0–1)
BUN SERPL-MCNC: 22 MG/DL (ref 6–20)
BUN/CREAT SERPL: 22 (ref 12–20)
CALCIUM SERPL-MCNC: 9.6 MG/DL (ref 8.5–10.1)
CHLORIDE SERPL-SCNC: 104 MMOL/L (ref 97–108)
CO2 SERPL-SCNC: 29 MMOL/L (ref 21–32)
CREAT SERPL-MCNC: 1 MG/DL (ref 0.7–1.3)
DIFFERENTIAL METHOD BLD: NORMAL
EOSINOPHIL # BLD: 0.1 K/UL (ref 0–0.4)
EOSINOPHIL NFR BLD: 1 % (ref 0–7)
ERYTHROCYTE [DISTWIDTH] IN BLOOD BY AUTOMATED COUNT: 13.1 % (ref 11.5–14.5)
GLUCOSE SERPL-MCNC: 139 MG/DL (ref 65–100)
HCT VFR BLD AUTO: 44.8 % (ref 36.6–50.3)
HGB BLD-MCNC: 14.3 G/DL (ref 12.1–17)
IMM GRANULOCYTES # BLD AUTO: 0 K/UL (ref 0–0.04)
IMM GRANULOCYTES NFR BLD AUTO: 0 % (ref 0–0.5)
LYMPHOCYTES # BLD: 1.4 K/UL (ref 0.8–3.5)
LYMPHOCYTES NFR BLD: 22 % (ref 12–49)
MCH RBC QN AUTO: 28.6 PG (ref 26–34)
MCHC RBC AUTO-ENTMCNC: 31.9 G/DL (ref 30–36.5)
MCV RBC AUTO: 89.6 FL (ref 80–99)
MONOCYTES # BLD: 0.6 K/UL (ref 0–1)
MONOCYTES NFR BLD: 8 % (ref 5–13)
NEUTS SEG # BLD: 4.5 K/UL (ref 1.8–8)
NEUTS SEG NFR BLD: 68 % (ref 32–75)
NRBC # BLD: 0 K/UL (ref 0–0.01)
NRBC BLD-RTO: 0 PER 100 WBC
PLATELET # BLD AUTO: 204 K/UL (ref 150–400)
PMV BLD AUTO: 9.9 FL (ref 8.9–12.9)
POTASSIUM SERPL-SCNC: 4.1 MMOL/L (ref 3.5–5.1)
RBC # BLD AUTO: 5 M/UL (ref 4.1–5.7)
SODIUM SERPL-SCNC: 138 MMOL/L (ref 136–145)
T4 FREE SERPL-MCNC: 1.1 NG/DL (ref 0.8–1.5)
TSH SERPL DL<=0.05 MIU/L-ACNC: 0.27 UIU/ML (ref 0.36–3.74)
WBC # BLD AUTO: 6.6 K/UL (ref 4.1–11.1)

## 2023-07-17 ENCOUNTER — OFFICE VISIT (OUTPATIENT)
Age: 73
End: 2023-07-17
Payer: MEDICARE

## 2023-07-17 VITALS
WEIGHT: 190.2 LBS | HEIGHT: 71 IN | OXYGEN SATURATION: 97 % | BODY MASS INDEX: 26.63 KG/M2 | DIASTOLIC BLOOD PRESSURE: 58 MMHG | HEART RATE: 65 BPM | RESPIRATION RATE: 18 BRPM | SYSTOLIC BLOOD PRESSURE: 126 MMHG

## 2023-07-17 DIAGNOSIS — I10 ESSENTIAL (PRIMARY) HYPERTENSION: Primary | ICD-10-CM

## 2023-07-17 DIAGNOSIS — M54.50 ACUTE LEFT-SIDED LOW BACK PAIN WITHOUT SCIATICA: ICD-10-CM

## 2023-07-17 DIAGNOSIS — E05.90 THYROTOXICOSIS WITHOUT THYROID STORM, UNSPECIFIED THYROTOXICOSIS TYPE: ICD-10-CM

## 2023-07-17 PROCEDURE — 3017F COLORECTAL CA SCREEN DOC REV: CPT | Performed by: INTERNAL MEDICINE

## 2023-07-17 PROCEDURE — 99213 OFFICE O/P EST LOW 20 MIN: CPT | Performed by: INTERNAL MEDICINE

## 2023-07-17 PROCEDURE — 1036F TOBACCO NON-USER: CPT | Performed by: INTERNAL MEDICINE

## 2023-07-17 PROCEDURE — 1123F ACP DISCUSS/DSCN MKR DOCD: CPT | Performed by: INTERNAL MEDICINE

## 2023-07-17 PROCEDURE — 3078F DIAST BP <80 MM HG: CPT | Performed by: INTERNAL MEDICINE

## 2023-07-17 PROCEDURE — G8427 DOCREV CUR MEDS BY ELIG CLIN: HCPCS | Performed by: INTERNAL MEDICINE

## 2023-07-17 PROCEDURE — 3074F SYST BP LT 130 MM HG: CPT | Performed by: INTERNAL MEDICINE

## 2023-07-17 PROCEDURE — G8419 CALC BMI OUT NRM PARAM NOF/U: HCPCS | Performed by: INTERNAL MEDICINE

## 2023-07-17 ASSESSMENT — PATIENT HEALTH QUESTIONNAIRE - PHQ9
1. LITTLE INTEREST OR PLEASURE IN DOING THINGS: 0
SUM OF ALL RESPONSES TO PHQ QUESTIONS 1-9: 0
SUM OF ALL RESPONSES TO PHQ QUESTIONS 1-9: 0
2. FEELING DOWN, DEPRESSED OR HOPELESS: 0
SUM OF ALL RESPONSES TO PHQ9 QUESTIONS 1 & 2: 0
SUM OF ALL RESPONSES TO PHQ QUESTIONS 1-9: 0
SUM OF ALL RESPONSES TO PHQ QUESTIONS 1-9: 0

## 2023-11-28 ENCOUNTER — HOSPITAL ENCOUNTER (OUTPATIENT)
Facility: HOSPITAL | Age: 73
Discharge: HOME | End: 2023-12-01

## 2023-11-29 LAB
T4 FREE SERPL-MCNC: 1.39 NG/DL (ref 0.82–1.77)
TSH SERPL DL<=0.005 MIU/L-ACNC: 0.43 UIU/ML (ref 0.45–4.5)

## 2023-12-01 DIAGNOSIS — E05.90 SUBCLINICAL HYPERTHYROIDISM: ICD-10-CM

## 2023-12-01 LAB — T3 SERPL-MCNC: 107 NG/DL (ref 71–180)

## 2024-01-03 ENCOUNTER — OFFICE VISIT (OUTPATIENT)
Age: 74
End: 2024-01-03
Payer: MEDICARE

## 2024-01-03 VITALS
WEIGHT: 191.8 LBS | DIASTOLIC BLOOD PRESSURE: 61 MMHG | HEIGHT: 71 IN | BODY MASS INDEX: 26.85 KG/M2 | HEART RATE: 66 BPM | SYSTOLIC BLOOD PRESSURE: 138 MMHG

## 2024-01-03 DIAGNOSIS — E05.90 SUBCLINICAL HYPERTHYROIDISM: Primary | ICD-10-CM

## 2024-01-03 DIAGNOSIS — E04.2 MULTINODULAR GOITER: ICD-10-CM

## 2024-01-03 PROCEDURE — 1123F ACP DISCUSS/DSCN MKR DOCD: CPT | Performed by: INTERNAL MEDICINE

## 2024-01-03 PROCEDURE — 3017F COLORECTAL CA SCREEN DOC REV: CPT | Performed by: INTERNAL MEDICINE

## 2024-01-03 PROCEDURE — G8428 CUR MEDS NOT DOCUMENT: HCPCS | Performed by: INTERNAL MEDICINE

## 2024-01-03 PROCEDURE — 1036F TOBACCO NON-USER: CPT | Performed by: INTERNAL MEDICINE

## 2024-01-03 PROCEDURE — G8419 CALC BMI OUT NRM PARAM NOF/U: HCPCS | Performed by: INTERNAL MEDICINE

## 2024-01-03 PROCEDURE — 3075F SYST BP GE 130 - 139MM HG: CPT | Performed by: INTERNAL MEDICINE

## 2024-01-03 PROCEDURE — 99214 OFFICE O/P EST MOD 30 MIN: CPT | Performed by: INTERNAL MEDICINE

## 2024-01-03 PROCEDURE — 3078F DIAST BP <80 MM HG: CPT | Performed by: INTERNAL MEDICINE

## 2024-01-03 PROCEDURE — G8484 FLU IMMUNIZE NO ADMIN: HCPCS | Performed by: INTERNAL MEDICINE

## 2024-01-03 RX ORDER — METOPROLOL SUCCINATE 25 MG/1
25 TABLET, EXTENDED RELEASE ORAL DAILY
Qty: 90 TABLET | Refills: 3 | Status: SHIPPED | OUTPATIENT
Start: 2024-01-03

## 2024-01-03 RX ORDER — METHIMAZOLE 5 MG/1
TABLET ORAL
Qty: 23 TABLET | Refills: 3 | Status: SHIPPED | OUTPATIENT
Start: 2024-01-03

## 2024-01-03 RX ORDER — ATORVASTATIN CALCIUM 40 MG/1
40 TABLET, FILM COATED ORAL DAILY
Qty: 90 TABLET | Refills: 3 | Status: SHIPPED | OUTPATIENT
Start: 2024-01-03

## 2024-01-03 NOTE — PROGRESS NOTES
Chief Complaint   Patient presents with    Thyroid Problem     Pcp and pharmacy verified     History of Present Illness: Rafal Soliz is a 73 y.o. male here for follow up of subclinical hyperthyroidism and Multinodular goiter.  In October 2019, as part of his annual wellness visit he had TFTs drawn. His TSH was 0.050 with a TT4 of 9.7. He was sent for a thyroid Uptake and Scan, which did not show any evidence of hot nodules or hyperactivity, but it did show an area of photopenia in the left lobe.  He had repeat TFTs in January 2020 and his TSH was 0.016 with FT4 of 1.69, TT3 of 124 and Thyroid Ab Panel was negative.  He was then sent for a thyroid US which showed multiple nodules 4.7cm in the left (the region of photopenia and increased blood flow) and 2.5cm in the right lobe.    Pt notes he was first diagnosed with hyperthyroid  \"over 30 years ago\". He has never been treated for hyperthyroidism/subclinical hyperthyroidism.    At our initial visit in March 2020 I started pt on MMI 5mg daily.  I also sent him for an FNA of the 4.7 cm left nodule and the 2.5 cm right nodule.   The pathology on both came back benign. In May 2020.  His repeat thyroid US in May 2021 and April 2022 were unchanged.  His US in April 2022 was unchanged from April 2022.    Pt denies any recent illnesses, injuries or hospitalizations.    His TSH in November 2023 was 0.426 with TT3 of 107 and FT4 of 1.39.      Pt is still taking the MMI 2.5mg every other day.     \"I am going to the gym to work out every day and other than that I stay at home and I am riding my bike 2-3 hours per day\".    He denies issues of CP, SOB, palpitations (\"my resting HR is in the 50's).   He denies issues of heat or cold intolerance, diarrhea or constipation.  He denies issues of dysphagia or dysphonia.  He denies any changes in his neck. He denies any cervical swelling or LAD.    Current Outpatient Medications   Medication Sig    metoprolol succinate (TOPROL XL) 25

## 2024-03-20 RX ORDER — CHLORTHALIDONE 25 MG/1
TABLET ORAL
Qty: 90 TABLET | Refills: 3 | Status: SHIPPED | OUTPATIENT
Start: 2024-03-20

## 2024-03-20 RX ORDER — POTASSIUM CHLORIDE 750 MG/1
10 TABLET, EXTENDED RELEASE ORAL DAILY
Qty: 90 TABLET | Refills: 3 | Status: SHIPPED | OUTPATIENT
Start: 2024-03-20

## 2024-03-20 RX ORDER — OLMESARTAN MEDOXOMIL 20 MG/1
TABLET ORAL
Qty: 90 TABLET | Refills: 3 | Status: SHIPPED | OUTPATIENT
Start: 2024-03-20

## 2024-05-21 ENCOUNTER — HOSPITAL ENCOUNTER (OUTPATIENT)
Facility: HOSPITAL | Age: 74
Discharge: HOME | End: 2024-05-24
Payer: MEDICARE

## 2024-05-21 DIAGNOSIS — E04.2 MULTINODULAR GOITER: ICD-10-CM

## 2024-05-21 PROCEDURE — 76536 US EXAM OF HEAD AND NECK: CPT

## 2024-05-24 ENCOUNTER — OFFICE VISIT (OUTPATIENT)
Age: 74
End: 2024-05-24

## 2024-05-24 VITALS
OXYGEN SATURATION: 98 % | HEIGHT: 71 IN | SYSTOLIC BLOOD PRESSURE: 133 MMHG | WEIGHT: 186.4 LBS | RESPIRATION RATE: 18 BRPM | BODY MASS INDEX: 26.1 KG/M2 | DIASTOLIC BLOOD PRESSURE: 68 MMHG | HEART RATE: 65 BPM

## 2024-05-24 DIAGNOSIS — Z12.5 SCREENING FOR PROSTATE CANCER: ICD-10-CM

## 2024-05-24 DIAGNOSIS — E78.2 MIXED HYPERLIPIDEMIA: ICD-10-CM

## 2024-05-24 DIAGNOSIS — Z00.00 MEDICARE ANNUAL WELLNESS VISIT, SUBSEQUENT: ICD-10-CM

## 2024-05-24 DIAGNOSIS — E05.90 SUBCLINICAL HYPERTHYROIDISM: Primary | ICD-10-CM

## 2024-05-24 DIAGNOSIS — E11.9 TYPE 2 DIABETES MELLITUS WITHOUT COMPLICATION, WITHOUT LONG-TERM CURRENT USE OF INSULIN (HCC): ICD-10-CM

## 2024-05-24 DIAGNOSIS — I10 PRIMARY HYPERTENSION: ICD-10-CM

## 2024-05-24 LAB
ALBUMIN SERPL-MCNC: 4.2 G/DL (ref 3.5–5)
ALBUMIN/GLOB SERPL: 1.5 (ref 1.1–2.2)
ALP SERPL-CCNC: 50 U/L (ref 45–117)
ALT SERPL-CCNC: 30 U/L (ref 12–78)
ANION GAP SERPL CALC-SCNC: 4 MMOL/L (ref 5–15)
AST SERPL-CCNC: 27 U/L (ref 15–37)
BASOPHILS # BLD: 0 K/UL (ref 0–0.1)
BASOPHILS NFR BLD: 1 % (ref 0–1)
BILIRUB SERPL-MCNC: 1.4 MG/DL (ref 0.2–1)
BUN SERPL-MCNC: 24 MG/DL (ref 6–20)
BUN/CREAT SERPL: 23 (ref 12–20)
CALCIUM SERPL-MCNC: 9.6 MG/DL (ref 8.5–10.1)
CHLORIDE SERPL-SCNC: 103 MMOL/L (ref 97–108)
CHOLEST SERPL-MCNC: 139 MG/DL
CO2 SERPL-SCNC: 32 MMOL/L (ref 21–32)
CREAT SERPL-MCNC: 1.04 MG/DL (ref 0.7–1.3)
DIFFERENTIAL METHOD BLD: NORMAL
EOSINOPHIL # BLD: 0.1 K/UL (ref 0–0.4)
EOSINOPHIL NFR BLD: 2 % (ref 0–7)
ERYTHROCYTE [DISTWIDTH] IN BLOOD BY AUTOMATED COUNT: 12.7 % (ref 11.5–14.5)
EST. AVERAGE GLUCOSE BLD GHB EST-MCNC: 126 MG/DL
GLOBULIN SER CALC-MCNC: 2.8 G/DL (ref 2–4)
GLUCOSE SERPL-MCNC: 144 MG/DL (ref 65–100)
HBA1C MFR BLD: 6 % (ref 4–5.6)
HCT VFR BLD AUTO: 43.5 % (ref 36.6–50.3)
HDLC SERPL-MCNC: 66 MG/DL
HDLC SERPL: 2.1 (ref 0–5)
HGB BLD-MCNC: 14.7 G/DL (ref 12.1–17)
IMM GRANULOCYTES # BLD AUTO: 0 K/UL (ref 0–0.04)
IMM GRANULOCYTES NFR BLD AUTO: 0 % (ref 0–0.5)
LDLC SERPL CALC-MCNC: 59.2 MG/DL (ref 0–100)
LYMPHOCYTES # BLD: 1.8 K/UL (ref 0.8–3.5)
LYMPHOCYTES NFR BLD: 30 % (ref 12–49)
MCH RBC QN AUTO: 28.7 PG (ref 26–34)
MCHC RBC AUTO-ENTMCNC: 33.8 G/DL (ref 30–36.5)
MCV RBC AUTO: 84.8 FL (ref 80–99)
MONOCYTES # BLD: 0.6 K/UL (ref 0–1)
MONOCYTES NFR BLD: 9 % (ref 5–13)
NEUTS SEG # BLD: 3.6 K/UL (ref 1.8–8)
NEUTS SEG NFR BLD: 58 % (ref 32–75)
NRBC # BLD: 0 K/UL (ref 0–0.01)
NRBC BLD-RTO: 0 PER 100 WBC
PLATELET # BLD AUTO: 193 K/UL (ref 150–400)
PMV BLD AUTO: 10.1 FL (ref 8.9–12.9)
POTASSIUM SERPL-SCNC: 4.1 MMOL/L (ref 3.5–5.1)
PROT SERPL-MCNC: 7 G/DL (ref 6.4–8.2)
PSA SERPL-MCNC: 0.9 NG/ML (ref 0.01–4)
RBC # BLD AUTO: 5.13 M/UL (ref 4.1–5.7)
SODIUM SERPL-SCNC: 139 MMOL/L (ref 136–145)
T4 FREE SERPL-MCNC: 1.1 NG/DL (ref 0.8–1.5)
TRIGL SERPL-MCNC: 69 MG/DL
TSH SERPL DL<=0.05 MIU/L-ACNC: 0.53 UIU/ML (ref 0.36–3.74)
VLDLC SERPL CALC-MCNC: 13.8 MG/DL
WBC # BLD AUTO: 6.1 K/UL (ref 4.1–11.1)

## 2024-05-24 ASSESSMENT — LIFESTYLE VARIABLES
HOW OFTEN DO YOU HAVE A DRINK CONTAINING ALCOHOL: NEVER
HOW MANY STANDARD DRINKS CONTAINING ALCOHOL DO YOU HAVE ON A TYPICAL DAY: PATIENT DOES NOT DRINK

## 2024-05-24 ASSESSMENT — PATIENT HEALTH QUESTIONNAIRE - PHQ9
SUM OF ALL RESPONSES TO PHQ9 QUESTIONS 1 & 2: 0
SUM OF ALL RESPONSES TO PHQ QUESTIONS 1-9: 0
1. LITTLE INTEREST OR PLEASURE IN DOING THINGS: NOT AT ALL
2. FEELING DOWN, DEPRESSED OR HOPELESS: NOT AT ALL

## 2024-05-24 NOTE — PATIENT INSTRUCTIONS
doctor.   Medicines    Take your medicines exactly as prescribed. Call your doctor if you think you are having a problem with your medicine.     If your doctor recommends aspirin, take the amount directed each day. Make sure you take aspirin and not another kind of pain reliever, such as acetaminophen (Tylenol).   When should you call for help?   Call 911 if you have symptoms of a heart attack. These may include:    Chest pain or pressure, or a strange feeling in the chest.     Sweating.     Shortness of breath.     Pain, pressure, or a strange feeling in the back, neck, jaw, or upper belly or in one or both shoulders or arms.     Lightheadedness or sudden weakness.     A fast or irregular heartbeat.   After you call 911, the  may tell you to chew 1 adult-strength or 2 to 4 low-dose aspirin. Wait for an ambulance. Do not try to drive yourself.  Watch closely for changes in your health, and be sure to contact your doctor if you have any problems.  Where can you learn more?  Go to https://www.[employer].net/patientEd and enter F075 to learn more about \"A Healthy Heart: Care Instructions.\"  Current as of: June 24, 2023               Content Version: 14.0  © 6749-8277 [email or web address].   Care instructions adapted under license by [employer]. If you have questions about a medical condition or this instruction, always ask your healthcare professional. [email or web address] disclaims any warranty or liability for your use of this information.      Personalized Preventive Plan for Rafal Soliz - 5/24/2024  Medicare offers a range of preventive health benefits. Some of the tests and screenings are paid in full while other may be subject to a deductible, co-insurance, and/or copay.    Some of these benefits include a comprehensive review of your medical history including lifestyle, illnesses that may run in your family, and various assessments and screenings as appropriate.    After reviewing your

## 2024-05-24 NOTE — PROGRESS NOTES
Medicare Annual Wellness Visit    Rafal Soliz is here for Medicare AWV, Hypertension (Has home BP checks. All 110/-120/70s), Cholesterol Problem (Routine F/U), and Discuss Labs (Due for A1C screening. )    Assessment & Plan   Subclinical hyperthyroidism  -     T3; Future  -     T4, Free; Future  -     TSH; Future  -     Comprehensive Metabolic Panel; Future  -     CBC with Auto Differential; Future  Mixed hyperlipidemia  -     Lipid Panel; Future  -     Comprehensive Metabolic Panel; Future  -     CBC with Auto Differential; Future  Primary hypertension  -     Comprehensive Metabolic Panel; Future  -     CBC with Auto Differential; Future  Screening for prostate cancer  -     PSA Screening; Future  Type 2 diabetes mellitus without complication, without long-term current use of insulin (HCC)  -     Hemoglobin A1C; Future  Medicare annual wellness visit, subsequent    Recommendations for Preventive Services Due: see orders and patient instructions/AVS.  Recommended screening schedule for the next 5-10 years is provided to the patient in written form: see Patient Instructions/AVS.     No follow-ups on file.     Subjective     Patient's complete Health Risk Assessment and screening values have been reviewed and are found in Flowsheets. The following problems were reviewed today and where indicated follow up appointments were made and/or referrals ordered.    Positive Risk Factor Screenings with Interventions:                  Hearing Screen:  Do you or your family notice any trouble with your hearing that hasn't been managed with hearing aids?: (!) Yes    Interventions:  Referred to Audiology       Advanced Directives:  Do you have a Living Will?: (!) No    Intervention:  has an advanced directive - a copy HAS NOT been provided.                               Objective   Vitals:    05/24/24 0835 05/24/24 0836 05/24/24 0837 05/24/24 0902   BP: (!) 147/45 (!) 141/53 (!) 149/65 133/68   Site: Left Upper Arm Left Upper 
Help Needed No Help Needed   Managing money (expenses/bills) No Help Needed No Help Needed   Moderately strenuous housework (laundry) No Help Needed No Help Needed   Shopping for personal items (toiletries/medicines) No Help Needed No Help Needed   Shopping for groceries No Help Needed No Help Needed   Driving No Help Needed No Help Needed   Climbing a flight of stairs No Help Needed No Help Needed   Getting to places beyond walking distances No Help Needed No Help Needed           5/24/2024     8:00 AM   AMB Abuse Screening   Do you ever feel afraid of your partner? N   Are you in a relationship with someone who physically or mentally threatens you? N   Is it safe for you to go home? Y       Advance Care Planning     The patient has appointed the following active healthcare agents:    Primary Decision Maker: Krys Soliz - [facility] - [phone]

## 2024-05-26 LAB — T3 SERPL-MCNC: 129 NG/DL (ref 71–180)

## 2024-06-01 DIAGNOSIS — E05.90 SUBCLINICAL HYPERTHYROIDISM: ICD-10-CM

## 2024-07-02 ENCOUNTER — OFFICE VISIT (OUTPATIENT)
Age: 74
End: 2024-07-02
Payer: MEDICARE

## 2024-07-02 VITALS
HEIGHT: 71 IN | BODY MASS INDEX: 26.63 KG/M2 | SYSTOLIC BLOOD PRESSURE: 147 MMHG | DIASTOLIC BLOOD PRESSURE: 64 MMHG | HEART RATE: 59 BPM | WEIGHT: 190.2 LBS

## 2024-07-02 DIAGNOSIS — E04.2 MULTINODULAR GOITER: ICD-10-CM

## 2024-07-02 DIAGNOSIS — E05.90 SUBCLINICAL HYPERTHYROIDISM: Primary | ICD-10-CM

## 2024-07-02 PROCEDURE — G2211 COMPLEX E/M VISIT ADD ON: HCPCS | Performed by: INTERNAL MEDICINE

## 2024-07-02 PROCEDURE — 3078F DIAST BP <80 MM HG: CPT | Performed by: INTERNAL MEDICINE

## 2024-07-02 PROCEDURE — 99214 OFFICE O/P EST MOD 30 MIN: CPT | Performed by: INTERNAL MEDICINE

## 2024-07-02 PROCEDURE — 3077F SYST BP >= 140 MM HG: CPT | Performed by: INTERNAL MEDICINE

## 2024-07-02 PROCEDURE — G8419 CALC BMI OUT NRM PARAM NOF/U: HCPCS | Performed by: INTERNAL MEDICINE

## 2024-07-02 PROCEDURE — 1123F ACP DISCUSS/DSCN MKR DOCD: CPT | Performed by: INTERNAL MEDICINE

## 2024-07-02 PROCEDURE — 3017F COLORECTAL CA SCREEN DOC REV: CPT | Performed by: INTERNAL MEDICINE

## 2024-07-02 PROCEDURE — G8427 DOCREV CUR MEDS BY ELIG CLIN: HCPCS | Performed by: INTERNAL MEDICINE

## 2024-07-02 PROCEDURE — 1036F TOBACCO NON-USER: CPT | Performed by: INTERNAL MEDICINE

## 2024-07-02 NOTE — PROGRESS NOTES
Chief Complaint   Patient presents with    Thyroid Problem     Pcp and pharmacy verified     History of Present Illness: Rafal Soliz is a 73 y.o. male here for follow up of subclinical hyperthyroidism and Multinodular goiter.  In October 2019, as part of his annual wellness visit he had TFTs drawn. His TSH was 0.050 with a TT4 of 9.7. He was sent for a thyroid Uptake and Scan, which did not show any evidence of hot nodules or hyperactivity, but it did show an area of photopenia in the left lobe.  He had repeat TFTs in January 2020 and his TSH was 0.016 with FT4 of 1.69, TT3 of 124 and Thyroid Ab Panel was negative.  He was then sent for a thyroid US which showed multiple nodules 4.7cm in the left (the region of photopenia and increased blood flow) and 2.5cm in the right lobe.    Pt notes he was first diagnosed with hyperthyroid  \"over 30 years ago\". He has never been treated for hyperthyroidism/subclinical hyperthyroidism.    At our initial visit in March 2020 I started pt on MMI 5mg daily.  I also sent him for an FNA of the 4.7 cm left nodule and the 2.5 cm right nodule.   The pathology on both came back benign. In May 2020.  His repeat thyroid US in May 2021 and April 2022 were unchanged.  His US in April 2022 was unchanged from April 2022.    Pt denies any recent illnesses, injuries or hospitalizations.    His TSH in May 2024 was 0.53 with TT3 of 129 and FT4 of 1.1.      Pt is still taking the MMI 2.5mg every other day.     \"I am going to the gym to work out every day and other than that I stay at home and I am riding my bike 2-3 hours per day\".    He denies issues of CP, SOB, palpitations (\"my resting HR is in the 50's).   He denies issues of heat or cold intolerance, diarrhea or constipation.  He denies issues of dysphagia or dysphonia.  He denies any changes in his neck. He denies any cervical swelling or LAD.    Current Outpatient Medications   Medication Sig    Omega-3 Fatty Acids (FISH OIL PO) Take by

## 2024-07-18 ENCOUNTER — TELEPHONE (OUTPATIENT)
Age: 74
End: 2024-07-18

## 2024-07-18 NOTE — TELEPHONE ENCOUNTER
Patient called back. Encouraged to use debrox over the weekend and then come in for a ear irrigation on Tuesday at 10AM. Patient able to repeat instructions.

## 2024-07-18 NOTE — TELEPHONE ENCOUNTER
Pt called wanting to speak with a nurse, he stated that when he woke up this morning he noticed that one of his ears was stopped up and wants to know what to do about it, please advise call back # [phone].

## 2024-10-28 RX ORDER — METHIMAZOLE 5 MG/1
TABLET ORAL
Qty: 23 TABLET | Refills: 3 | OUTPATIENT
Start: 2024-10-28

## 2024-10-28 RX ORDER — ATORVASTATIN CALCIUM 40 MG/1
40 TABLET, FILM COATED ORAL DAILY
Qty: 90 TABLET | Refills: 3 | OUTPATIENT
Start: 2024-10-28

## 2024-10-28 RX ORDER — METOPROLOL SUCCINATE 25 MG/1
25 TABLET, EXTENDED RELEASE ORAL DAILY
Qty: 90 TABLET | Refills: 3 | OUTPATIENT
Start: 2024-10-28

## 2024-10-29 ENCOUNTER — OFFICE VISIT (OUTPATIENT)
Age: 74
End: 2024-10-29
Payer: MEDICARE

## 2024-10-29 VITALS
HEART RATE: 78 BPM | TEMPERATURE: 97.3 F | HEIGHT: 71 IN | WEIGHT: 183 LBS | OXYGEN SATURATION: 96 % | RESPIRATION RATE: 18 BRPM | SYSTOLIC BLOOD PRESSURE: 148 MMHG | DIASTOLIC BLOOD PRESSURE: 64 MMHG | BODY MASS INDEX: 25.62 KG/M2

## 2024-10-29 DIAGNOSIS — H69.92 EUSTACHIAN TUBE DYSFUNCTION, LEFT: Primary | ICD-10-CM

## 2024-10-29 DIAGNOSIS — I10 PRIMARY HYPERTENSION: ICD-10-CM

## 2024-10-29 DIAGNOSIS — H81.12 BENIGN PAROXYSMAL POSITIONAL VERTIGO, LEFT: ICD-10-CM

## 2024-10-29 PROCEDURE — 1160F RVW MEDS BY RX/DR IN RCRD: CPT | Performed by: FAMILY MEDICINE

## 2024-10-29 PROCEDURE — 1159F MED LIST DOCD IN RCRD: CPT | Performed by: FAMILY MEDICINE

## 2024-10-29 PROCEDURE — 1036F TOBACCO NON-USER: CPT | Performed by: FAMILY MEDICINE

## 2024-10-29 PROCEDURE — 99214 OFFICE O/P EST MOD 30 MIN: CPT | Performed by: FAMILY MEDICINE

## 2024-10-29 PROCEDURE — G8482 FLU IMMUNIZE ORDER/ADMIN: HCPCS | Performed by: FAMILY MEDICINE

## 2024-10-29 PROCEDURE — 1126F AMNT PAIN NOTED NONE PRSNT: CPT | Performed by: FAMILY MEDICINE

## 2024-10-29 PROCEDURE — 3078F DIAST BP <80 MM HG: CPT | Performed by: FAMILY MEDICINE

## 2024-10-29 PROCEDURE — G8419 CALC BMI OUT NRM PARAM NOF/U: HCPCS | Performed by: FAMILY MEDICINE

## 2024-10-29 PROCEDURE — 1123F ACP DISCUSS/DSCN MKR DOCD: CPT | Performed by: FAMILY MEDICINE

## 2024-10-29 PROCEDURE — G8427 DOCREV CUR MEDS BY ELIG CLIN: HCPCS | Performed by: FAMILY MEDICINE

## 2024-10-29 PROCEDURE — 3077F SYST BP >= 140 MM HG: CPT | Performed by: FAMILY MEDICINE

## 2024-10-29 RX ORDER — FLUTICASONE PROPIONATE 50 MCG
2 SPRAY, SUSPENSION (ML) NASAL DAILY
Qty: 16 G | Refills: 2 | Status: SHIPPED | OUTPATIENT
Start: 2024-10-29

## 2024-10-29 SDOH — ECONOMIC STABILITY: FOOD INSECURITY: WITHIN THE PAST 12 MONTHS, YOU WORRIED THAT YOUR FOOD WOULD RUN OUT BEFORE YOU GOT MONEY TO BUY MORE.: NEVER TRUE

## 2024-10-29 SDOH — ECONOMIC STABILITY: FOOD INSECURITY: WITHIN THE PAST 12 MONTHS, THE FOOD YOU BOUGHT JUST DIDN'T LAST AND YOU DIDN'T HAVE MONEY TO GET MORE.: NEVER TRUE

## 2024-10-29 SDOH — ECONOMIC STABILITY: INCOME INSECURITY: HOW HARD IS IT FOR YOU TO PAY FOR THE VERY BASICS LIKE FOOD, HOUSING, MEDICAL CARE, AND HEATING?: NOT HARD AT ALL

## 2024-10-29 ASSESSMENT — PATIENT HEALTH QUESTIONNAIRE - PHQ9
SUM OF ALL RESPONSES TO PHQ QUESTIONS 1-9: 0
2. FEELING DOWN, DEPRESSED OR HOPELESS: NOT AT ALL
SUM OF ALL RESPONSES TO PHQ QUESTIONS 1-9: 0
SUM OF ALL RESPONSES TO PHQ9 QUESTIONS 1 & 2: 0
1. LITTLE INTEREST OR PLEASURE IN DOING THINGS: NOT AT ALL
SUM OF ALL RESPONSES TO PHQ QUESTIONS 1-9: 0
SUM OF ALL RESPONSES TO PHQ QUESTIONS 1-9: 0

## 2024-10-29 NOTE — PROGRESS NOTES
Encounters:   10/29/24 83 kg (183 lb)   07/02/24 86.3 kg (190 lb 3.2 oz)   05/24/24 84.6 kg (186 lb 6.4 oz)     BP Readings from Last 3 Encounters:   10/29/24 (!) 148/64   07/02/24 (!) 147/64   05/24/24 133/68     Physical Examination: General appearance - alert, well appearing, and in no distress  Mental status - alert, oriented to person, place, and time  Eyes - pupils equal and reactive, extraocular eye movements intact  ENT - bilateral external ears and nose normal. Normal lips  Neck - supple, no significant adenopathy, POS moderate thyromegaly, L>R, no mass  Chest - clear to auscultation, no wheezes, rales or rhonchi, symmetric air entry  Heart - normal rate, regular rhythm, normal S1, S2, no murmurs, rubs, clicks or gallops  Extremities - peripheral pulses normal, no pedal edema, no clubbing or cyanosis  Neuro- CN 2-12 intact to test, nl coordination and ROM UE and LE. Nl gait. Paton Hallpike slightly POS on L side, with some buzzing and minimal \"swimmy\" feeling elicited     A/p:  BPPV  Likely provoked by eustachian dysfunction. Work on clearing ET's regularly, and decrease nasopharyngeal congestion with flonase 2pf daily for 1-2 wk/ PRN. If has more vertigo sx, tx with Epley exercises to L side.     Elev BP  About the same. Rec to discuss with PCP. GFR and man/cr reassuring. Con't current tx, no sign orthostasis on exam.    F/U PRN

## 2024-10-29 NOTE — PROGRESS NOTES
(expenses/bills) No Help Needed No Help Needed No Help Needed   Moderately strenuous housework (laundry) No Help Needed No Help Needed No Help Needed   Shopping for personal items (toiletries/medicines) No Help Needed No Help Needed No Help Needed   Shopping for groceries No Help Needed No Help Needed No Help Needed   Driving No Help Needed No Help Needed No Help Needed   Climbing a flight of stairs No Help Needed No Help Needed No Help Needed   Getting to places beyond walking distances No Help Needed No Help Needed No Help Needed           10/29/2024     3:00 PM   AMB Abuse Screening   Do you ever feel afraid of your partner? N   Are you in a relationship with someone who physically or mentally threatens you? N   Is it safe for you to go home? Y       Advance Care Planning     The patient has appointed the following active healthcare agents:    Primary Decision Maker: Krys Soliz - Spouse - [phone]

## 2024-11-21 ENCOUNTER — OFFICE VISIT (OUTPATIENT)
Age: 74
End: 2024-11-21
Payer: MEDICARE

## 2024-11-21 VITALS
SYSTOLIC BLOOD PRESSURE: 143 MMHG | HEART RATE: 75 BPM | RESPIRATION RATE: 18 BRPM | DIASTOLIC BLOOD PRESSURE: 53 MMHG | WEIGHT: 184.8 LBS | HEIGHT: 71 IN | OXYGEN SATURATION: 98 % | TEMPERATURE: 97.8 F | BODY MASS INDEX: 25.87 KG/M2

## 2024-11-21 DIAGNOSIS — G89.29 CHRONIC LEFT-SIDED LOW BACK PAIN WITH LEFT-SIDED SCIATICA: ICD-10-CM

## 2024-11-21 DIAGNOSIS — H93.8X2 EAR FULLNESS, LEFT: ICD-10-CM

## 2024-11-21 DIAGNOSIS — H69.92 CHRONIC DYSFUNCTION OF LEFT EUSTACHIAN TUBE: ICD-10-CM

## 2024-11-21 DIAGNOSIS — R42 VERTIGO: Primary | ICD-10-CM

## 2024-11-21 DIAGNOSIS — M54.42 CHRONIC LEFT-SIDED LOW BACK PAIN WITH LEFT-SIDED SCIATICA: ICD-10-CM

## 2024-11-21 PROCEDURE — G8427 DOCREV CUR MEDS BY ELIG CLIN: HCPCS | Performed by: NURSE PRACTITIONER

## 2024-11-21 PROCEDURE — 1123F ACP DISCUSS/DSCN MKR DOCD: CPT | Performed by: NURSE PRACTITIONER

## 2024-11-21 PROCEDURE — 3077F SYST BP >= 140 MM HG: CPT | Performed by: NURSE PRACTITIONER

## 2024-11-21 PROCEDURE — 1126F AMNT PAIN NOTED NONE PRSNT: CPT | Performed by: NURSE PRACTITIONER

## 2024-11-21 PROCEDURE — 1160F RVW MEDS BY RX/DR IN RCRD: CPT | Performed by: NURSE PRACTITIONER

## 2024-11-21 PROCEDURE — 99214 OFFICE O/P EST MOD 30 MIN: CPT | Performed by: NURSE PRACTITIONER

## 2024-11-21 PROCEDURE — G8419 CALC BMI OUT NRM PARAM NOF/U: HCPCS | Performed by: NURSE PRACTITIONER

## 2024-11-21 PROCEDURE — G8482 FLU IMMUNIZE ORDER/ADMIN: HCPCS | Performed by: NURSE PRACTITIONER

## 2024-11-21 PROCEDURE — 1159F MED LIST DOCD IN RCRD: CPT | Performed by: NURSE PRACTITIONER

## 2024-11-21 PROCEDURE — 3078F DIAST BP <80 MM HG: CPT | Performed by: NURSE PRACTITIONER

## 2024-11-21 PROCEDURE — 1036F TOBACCO NON-USER: CPT | Performed by: NURSE PRACTITIONER

## 2024-11-21 RX ORDER — MECLIZINE HYDROCHLORIDE 25 MG/1
25 TABLET ORAL 3 TIMES DAILY PRN
Qty: 30 TABLET | Refills: 0 | Status: SHIPPED | OUTPATIENT
Start: 2024-11-21 | End: 2024-12-01

## 2024-11-21 RX ORDER — DICLOFENAC SODIUM 75 MG/1
75 TABLET, DELAYED RELEASE ORAL 2 TIMES DAILY
Qty: 60 TABLET | Refills: 3 | Status: SHIPPED | OUTPATIENT
Start: 2024-11-21

## 2024-11-21 RX ORDER — DICLOFENAC SODIUM 75 MG/1
75 TABLET, DELAYED RELEASE ORAL 2 TIMES DAILY
COMMUNITY
End: 2024-11-21

## 2024-11-21 ASSESSMENT — PATIENT HEALTH QUESTIONNAIRE - PHQ9
SUM OF ALL RESPONSES TO PHQ QUESTIONS 1-9: 0
SUM OF ALL RESPONSES TO PHQ QUESTIONS 1-9: 0
SUM OF ALL RESPONSES TO PHQ9 QUESTIONS 1 & 2: 0
1. LITTLE INTEREST OR PLEASURE IN DOING THINGS: NOT AT ALL
SUM OF ALL RESPONSES TO PHQ QUESTIONS 1-9: 0
SUM OF ALL RESPONSES TO PHQ QUESTIONS 1-9: 0
2. FEELING DOWN, DEPRESSED OR HOPELESS: NOT AT ALL

## 2024-11-21 NOTE — PROGRESS NOTES
Chief Complaint   Patient presents with    Dizziness     Pt reports feeling dizzy x 1 month. Pt reports having a buzzing sound in his left ear.          HPI:       is a 74 y.o. male.  Dr. Aguilar is his PCP. He has a past medical history of Conductive hearing loss, middle ear, DM2, Elevated liver enzymes, HTN, Hyperlipidemia, Subclinical hyperthyroidism, and Tinnitus.     New Issues:  He has been having issues with his left ear for about a month.  Hears ringing that \"sounds like a motor boat\".  Comes and goes.  Does not sound like a heart beat. He was seen by Dr. Tracey who recommended Flonase and Epley Maneuvers.  He has been using Flonase for almost a month. No relief.  Difficult to sleep because of the ringing.  He reports he was seen by Dr. Worley for low back pain that radiated down his left leg.  Reports history of arthritis in his spine.  Was given Diclofenac.  Recently used a shovel to remove leaves at his [employer].  Started another flare of the low back pain.  Would like a refill.     Allergies   Allergen Reactions    Ace Inhibitors Cough    Amlodipine      Other reaction(s): Not Reported This Time    Loratadine Hives    Penicillins      Other reaction(s): Not Reported This Time       Current Outpatient Medications   Medication Sig Dispense Refill    diclofenac (VOLTAREN) 75 MG EC tablet Take 1 tablet by mouth 2 times daily      fluticasone (FLONASE) 50 MCG/ACT nasal spray 2 sprays by Each Nostril route daily Indications: as needed for ear congestion/ buzzing 16 g 2    Omega-3 Fatty Acids (FISH OIL PO) Take by mouth in the morning and at bedtime      olmesartan (BENICAR) 20 MG tablet TAKE 1 TABLET EVERY NIGHT FOR HIGH BLOOD PRESSURE 90 tablet 3    chlorthalidone (HYGROTON) 25 MG tablet TAKE 1 TABLET EVERY DAY WITH BREAKFAST 90 tablet 3    potassium chloride (KLOR-CON M) 10 MEQ extended release tablet TAKE 1 TABLET EVERY DAY 90 tablet 3    metoprolol succinate (TOPROL XL) 25 MG extended release 
Help Needed   Using the telphone No Help Needed No Help Needed No Help Needed No Help Needed   Taking your medications No Help Needed No Help Needed No Help Needed No Help Needed   Preparing meals No Help Needed No Help Needed No Help Needed No Help Needed   Managing money (expenses/bills) No Help Needed No Help Needed No Help Needed No Help Needed   Moderately strenuous housework (laundry) No Help Needed No Help Needed No Help Needed No Help Needed   Shopping for personal items (toiletries/medicines) No Help Needed No Help Needed No Help Needed No Help Needed   Shopping for groceries No Help Needed No Help Needed No Help Needed No Help Needed   Driving No Help Needed No Help Needed No Help Needed No Help Needed   Climbing a flight of stairs No Help Needed No Help Needed No Help Needed No Help Needed   Getting to places beyond walking distances No Help Needed No Help Needed No Help Needed No Help Needed           11/21/2024     2:00 PM   AMB Abuse Screening   Do you ever feel afraid of your partner? N   Are you in a relationship with someone who physically or mentally threatens you? N   Is it safe for you to go home? Y       Advance Care Planning     The patient has appointed the following active healthcare agents:    Primary Decision Maker: Krys Soliz - Spouse - [phone]

## 2024-12-01 DIAGNOSIS — E05.90 SUBCLINICAL HYPERTHYROIDISM: ICD-10-CM

## 2024-12-07 NOTE — PROGRESS NOTES
BRAO OS:  Needs carotid doppler    Duration 30 minutes primarily education, review of imaging, labs and records.    Assessment & Plan  1. Left retinal artery occlusion.  He has an acute or at least recently recognized left retinal artery occlusion or Hollenhorst plaque. There are no findings on exam today with respect to this. A carotid Doppler will be scheduled sometime in the next week to assess the carotid arteries.    2. Hypertension.  On repeat, his blood pressure was 138/80, and he is currently at goal. He is advised to monitor his blood pressure at home and report the readings via [facility]kat.    3. Diabetes.  He is due for an A1c test. Blood work will be drawn today.    4. Hyperthyroidism with goiter.  He is followed by Dr. Elam with endocrinology. He is due for labs today.              Chief Complaint   Patient presents with    Eye Problem     MATTHEW (left eye)   Needs Carotid doopler    Hypertension         Orders Placed This Encounter   Procedures    CBC with Auto Differential     Standing Status:   Future     Standing Expiration Date:   12/9/2025    Comprehensive Metabolic Panel     Standing Status:   Future     Standing Expiration Date:   12/9/2025    Hemoglobin A1C     Standing Status:   Future     Standing Expiration Date:   12/9/2025    Lipid Panel     Standing Status:   Future     Standing Expiration Date:   12/9/2025    TSH     Standing Status:   Future     Standing Expiration Date:   12/9/2025    T4, Free     Standing Status:   Future     Standing Expiration Date:   12/9/2025    T3     Standing Status:   Future     Standing Expiration Date:   12/9/2025    Vascular duplex carotid bilateral     Standing Status:   Future     Standing Expiration Date:   12/9/2025       Richie Aguilar MD, FACP      History of Present Illness  The patient presents for evaluation of multiple medical concerns.    He reports that his ophthalmologist has identified a small plaque in one of the arteries in his left eye. He is

## 2024-12-09 ENCOUNTER — OFFICE VISIT (OUTPATIENT)
Age: 74
End: 2024-12-09
Payer: MEDICARE

## 2024-12-09 VITALS
TEMPERATURE: 98 F | SYSTOLIC BLOOD PRESSURE: 138 MMHG | HEIGHT: 71 IN | DIASTOLIC BLOOD PRESSURE: 80 MMHG | WEIGHT: 184.6 LBS | OXYGEN SATURATION: 100 % | HEART RATE: 70 BPM | BODY MASS INDEX: 25.84 KG/M2 | RESPIRATION RATE: 18 BRPM

## 2024-12-09 DIAGNOSIS — E78.2 MIXED HYPERLIPIDEMIA: ICD-10-CM

## 2024-12-09 DIAGNOSIS — E05.90 THYROTOXICOSIS WITHOUT THYROID STORM, UNSPECIFIED THYROTOXICOSIS TYPE: ICD-10-CM

## 2024-12-09 DIAGNOSIS — R73.03 PREDIABETES: ICD-10-CM

## 2024-12-09 DIAGNOSIS — I10 PRIMARY HYPERTENSION: ICD-10-CM

## 2024-12-09 DIAGNOSIS — H34.212 HOLLENHORST PLAQUE, LEFT EYE: Primary | ICD-10-CM

## 2024-12-09 PROCEDURE — G8427 DOCREV CUR MEDS BY ELIG CLIN: HCPCS | Performed by: INTERNAL MEDICINE

## 2024-12-09 PROCEDURE — 1036F TOBACCO NON-USER: CPT | Performed by: INTERNAL MEDICINE

## 2024-12-09 PROCEDURE — G8482 FLU IMMUNIZE ORDER/ADMIN: HCPCS | Performed by: INTERNAL MEDICINE

## 2024-12-09 PROCEDURE — 3078F DIAST BP <80 MM HG: CPT | Performed by: INTERNAL MEDICINE

## 2024-12-09 PROCEDURE — G8419 CALC BMI OUT NRM PARAM NOF/U: HCPCS | Performed by: INTERNAL MEDICINE

## 2024-12-09 PROCEDURE — 1126F AMNT PAIN NOTED NONE PRSNT: CPT | Performed by: INTERNAL MEDICINE

## 2024-12-09 PROCEDURE — 1159F MED LIST DOCD IN RCRD: CPT | Performed by: INTERNAL MEDICINE

## 2024-12-09 PROCEDURE — 99214 OFFICE O/P EST MOD 30 MIN: CPT | Performed by: INTERNAL MEDICINE

## 2024-12-09 PROCEDURE — 1123F ACP DISCUSS/DSCN MKR DOCD: CPT | Performed by: INTERNAL MEDICINE

## 2024-12-09 PROCEDURE — 3075F SYST BP GE 130 - 139MM HG: CPT | Performed by: INTERNAL MEDICINE

## 2024-12-09 ASSESSMENT — PATIENT HEALTH QUESTIONNAIRE - PHQ9
SUM OF ALL RESPONSES TO PHQ QUESTIONS 1-9: 0
SUM OF ALL RESPONSES TO PHQ QUESTIONS 1-9: 0
2. FEELING DOWN, DEPRESSED OR HOPELESS: NOT AT ALL
SUM OF ALL RESPONSES TO PHQ9 QUESTIONS 1 & 2: 0
SUM OF ALL RESPONSES TO PHQ QUESTIONS 1-9: 0
1. LITTLE INTEREST OR PLEASURE IN DOING THINGS: NOT AT ALL
SUM OF ALL RESPONSES TO PHQ QUESTIONS 1-9: 0

## 2024-12-09 NOTE — PROGRESS NOTES
Rafal Soliz is a 74 y.o. male presenting for/with:    Chief Complaint   Patient presents with    Eye Problem     BRAO (left eye)   Needs Carotid doopler    Hypertension       Vitals:    12/09/24 0952   BP: (!) 149/57   Site: Left Upper Arm   Position: Sitting   Cuff Size: Large Adult   Pulse: 70   Resp: 18   Temp: 98 °F (36.7 °C)   TempSrc: Temporal   SpO2: 100%   Weight: 83.7 kg (184 lb 9.6 oz)   Height: 1.803 m (5' 11\")       Pain Scale: 0 - No pain/10  Pain Location:     \"Have you been to the ER, urgent care clinic since your last visit?  Hospitalized since your last visit?\"    NO    “Have you seen or consulted any other health care providers outside of [facility] since your last visit?”    [facility] 12/2024 12/9/2024     9:50 AM   PHQ-9    Little interest or pleasure in doing things 0   Feeling down, depressed, or hopeless 0   PHQ-2 Score 0   PHQ-9 Total Score 0           11/21/2024     2:40 PM 3/13/2023    12:00 AM   [facility] AMB LEARNING ASSESSMENT   Primary Learner Patient Patient   Primary Language ENGLISH ENGLISH   Learning Preference DEMONSTRATION    VIDEOS VIDEOS   Answered By patient self   Relationship to Learner SELF SELF            12/9/2024     9:51 AM   Amb Fall Risk Assessment and TUG Test   Do you feel unsteady or are you worried about falling?  no   2 or more falls in past year? no   Fall with injury in past year? no           12/9/2024     9:00 AM 11/21/2024     2:00 PM 10/29/2024     3:00 PM 5/24/2024     8:00 AM 7/7/2023    10:00 AM   ADL ASSESSMENT   Feeding yourself No Help Needed No Help Needed No Help Needed No Help Needed No Help Needed   Getting from bed to chair No Help Needed No Help Needed No Help Needed No Help Needed No Help Needed   Getting dressed No Help Needed No Help Needed No Help Needed No Help Needed No Help Needed   Bathing or showering No Help Needed No Help Needed No Help Needed No Help Needed No Help Needed   Walk across the room

## 2024-12-11 LAB
ALBUMIN SERPL-MCNC: 4.3 G/DL (ref 3.5–5)
ALBUMIN/GLOB SERPL: 1.6 (ref 1.1–2.2)
ALP SERPL-CCNC: 46 U/L (ref 45–117)
ALT SERPL-CCNC: 38 U/L (ref 12–78)
ANION GAP SERPL CALC-SCNC: 7 MMOL/L (ref 2–12)
AST SERPL-CCNC: 24 U/L (ref 15–37)
BASOPHILS # BLD: 0 K/UL (ref 0–0.1)
BASOPHILS NFR BLD: 1 % (ref 0–1)
BILIRUB SERPL-MCNC: 1 MG/DL (ref 0.2–1)
BUN SERPL-MCNC: 33 MG/DL (ref 6–20)
BUN/CREAT SERPL: 27 (ref 12–20)
CALCIUM SERPL-MCNC: 9.7 MG/DL (ref 8.5–10.1)
CHLORIDE SERPL-SCNC: 103 MMOL/L (ref 97–108)
CHOLEST SERPL-MCNC: 167 MG/DL
CO2 SERPL-SCNC: 30 MMOL/L (ref 21–32)
CREAT SERPL-MCNC: 1.24 MG/DL (ref 0.7–1.3)
DIFFERENTIAL METHOD BLD: NORMAL
EOSINOPHIL # BLD: 0.3 K/UL (ref 0–0.4)
EOSINOPHIL NFR BLD: 4 % (ref 0–7)
ERYTHROCYTE [DISTWIDTH] IN BLOOD BY AUTOMATED COUNT: 12.4 % (ref 11.5–14.5)
EST. AVERAGE GLUCOSE BLD GHB EST-MCNC: 128 MG/DL
GLOBULIN SER CALC-MCNC: 2.7 G/DL (ref 2–4)
GLUCOSE SERPL-MCNC: 135 MG/DL (ref 65–100)
HBA1C MFR BLD: 6.1 % (ref 4–5.6)
HCT VFR BLD AUTO: 41.1 % (ref 36.6–50.3)
HDLC SERPL-MCNC: 55 MG/DL
HDLC SERPL: 3 (ref 0–5)
HGB BLD-MCNC: 13.6 G/DL (ref 12.1–17)
IMM GRANULOCYTES # BLD AUTO: 0 K/UL (ref 0–0.04)
IMM GRANULOCYTES NFR BLD AUTO: 0 % (ref 0–0.5)
LDLC SERPL CALC-MCNC: 87 MG/DL (ref 0–100)
LYMPHOCYTES # BLD: 1.9 K/UL (ref 0.8–3.5)
LYMPHOCYTES NFR BLD: 28 % (ref 12–49)
MCH RBC QN AUTO: 29.5 PG (ref 26–34)
MCHC RBC AUTO-ENTMCNC: 33.1 G/DL (ref 30–36.5)
MCV RBC AUTO: 89.2 FL (ref 80–99)
MONOCYTES # BLD: 0.6 K/UL (ref 0–1)
MONOCYTES NFR BLD: 9 % (ref 5–13)
NEUTS SEG # BLD: 3.9 K/UL (ref 1.8–8)
NEUTS SEG NFR BLD: 58 % (ref 32–75)
NRBC # BLD: 0 K/UL (ref 0–0.01)
NRBC BLD-RTO: 0 PER 100 WBC
PLATELET # BLD AUTO: 217 K/UL (ref 150–400)
PMV BLD AUTO: 9.7 FL (ref 8.9–12.9)
POTASSIUM SERPL-SCNC: 4.3 MMOL/L (ref 3.5–5.1)
PROT SERPL-MCNC: 7 G/DL (ref 6.4–8.2)
RBC # BLD AUTO: 4.61 M/UL (ref 4.1–5.7)
SODIUM SERPL-SCNC: 140 MMOL/L (ref 136–145)
T3 SERPL-MCNC: 103 NG/DL (ref 71–180)
T4 FREE SERPL-MCNC: 1.2 NG/DL (ref 0.8–1.5)
TRIGL SERPL-MCNC: 125 MG/DL
TSH SERPL DL<=0.05 MIU/L-ACNC: 0.53 UIU/ML (ref 0.36–3.74)
VLDLC SERPL CALC-MCNC: 25 MG/DL
WBC # BLD AUTO: 6.6 K/UL (ref 4.1–11.1)

## 2024-12-11 NOTE — RESULT ENCOUNTER NOTE
Mr Soliz  Your labs are back and your A1c is 6.1.  your liver, kidneys, thyroid, electrolytes, and lipids are good.

## 2024-12-20 ENCOUNTER — HOSPITAL ENCOUNTER (OUTPATIENT)
Facility: HOSPITAL | Age: 74
Discharge: HOME | End: 2024-12-23
Attending: INTERNAL MEDICINE
Payer: MEDICARE

## 2024-12-20 DIAGNOSIS — H34.212 HOLLENHORST PLAQUE, LEFT EYE: ICD-10-CM

## 2024-12-20 LAB
VAS LEFT CCA DIST EDV: 18 CM/S
VAS LEFT CCA DIST PSV: 68.5 CM/S
VAS LEFT CCA PROX EDV: 13.2 CM/S
VAS LEFT CCA PROX PSV: 83.7 CM/S
VAS LEFT ECA EDV: 10.2 CM/S
VAS LEFT ECA PSV: 74.1 CM/S
VAS LEFT ICA DIST EDV: 19.2 CM/S
VAS LEFT ICA DIST PSV: 63 CM/S
VAS LEFT ICA MID EDV: 14.9 CM/S
VAS LEFT ICA MID PSV: 51.2 CM/S
VAS LEFT ICA PROX EDV: 12 CM/S
VAS LEFT ICA PROX PSV: 42.4 CM/S
VAS LEFT VERTEBRAL EDV: 9.5 CM/S
VAS LEFT VERTEBRAL PSV: 29.6 CM/S
VAS RIGHT CCA DIST EDV: 12.8 CM/S
VAS RIGHT CCA DIST PSV: 69.3 CM/S
VAS RIGHT CCA PROX EDV: 15.6 CM/S
VAS RIGHT CCA PROX PSV: 110.2 CM/S
VAS RIGHT ECA EDV: 11.5 CM/S
VAS RIGHT ECA PSV: 77.5 CM/S
VAS RIGHT ICA DIST EDV: 17 CM/S
VAS RIGHT ICA DIST PSV: 60.9 CM/S
VAS RIGHT ICA MID EDV: 14.9 CM/S
VAS RIGHT ICA MID PSV: 65.8 CM/S
VAS RIGHT ICA PROX EDV: 13.8 CM/S
VAS RIGHT ICA PROX PSV: 71.6 CM/S
VAS RIGHT VERTEBRAL EDV: 7.6 CM/S
VAS RIGHT VERTEBRAL PSV: 49.3 CM/S

## 2024-12-20 PROCEDURE — 93880 EXTRACRANIAL BILAT STUDY: CPT

## 2025-01-09 DIAGNOSIS — G89.29 CHRONIC LEFT-SIDED LOW BACK PAIN WITH LEFT-SIDED SCIATICA: ICD-10-CM

## 2025-01-09 DIAGNOSIS — M54.42 CHRONIC LEFT-SIDED LOW BACK PAIN WITH LEFT-SIDED SCIATICA: ICD-10-CM

## 2025-01-09 RX ORDER — METHIMAZOLE 5 MG/1
2.5 TABLET ORAL EVERY OTHER DAY
Qty: 23 TABLET | Refills: 3 | Status: SHIPPED | OUTPATIENT
Start: 2025-01-09

## 2025-01-09 RX ORDER — DICLOFENAC SODIUM 75 MG/1
75 TABLET, DELAYED RELEASE ORAL 2 TIMES DAILY
Qty: 60 TABLET | Refills: 3 | Status: SHIPPED | OUTPATIENT
Start: 2025-01-09

## 2025-01-09 RX ORDER — CHLORTHALIDONE 25 MG/1
25 TABLET ORAL DAILY
Qty: 90 TABLET | Refills: 3 | Status: SHIPPED | OUTPATIENT
Start: 2025-01-09

## 2025-01-09 RX ORDER — METOPROLOL SUCCINATE 25 MG/1
25 TABLET, EXTENDED RELEASE ORAL DAILY
Qty: 90 TABLET | Refills: 3 | Status: SHIPPED | OUTPATIENT
Start: 2025-01-09

## 2025-01-09 RX ORDER — OLMESARTAN MEDOXOMIL 20 MG/1
20 TABLET ORAL DAILY
Qty: 90 TABLET | Refills: 3 | Status: SHIPPED | OUTPATIENT
Start: 2025-01-09

## 2025-01-09 RX ORDER — POTASSIUM CHLORIDE 750 MG/1
10 TABLET, EXTENDED RELEASE ORAL DAILY
Qty: 90 TABLET | Refills: 3 | Status: SHIPPED | OUTPATIENT
Start: 2025-01-09

## 2025-01-09 RX ORDER — ATORVASTATIN CALCIUM 40 MG/1
40 TABLET, FILM COATED ORAL DAILY
Qty: 90 TABLET | Refills: 3 | Status: SHIPPED | OUTPATIENT
Start: 2025-01-09

## 2025-01-09 NOTE — TELEPHONE ENCOUNTER
[phone] contact # jermain Marcano, need refill called into new Pharmacy [facility] (already changed in chart) for ALL meds    Thanks,

## 2025-03-25 NOTE — PATIENT INSTRUCTIONS
Vaccine Information Statement    Influenza (Flu) Vaccine (Inactivated or Recombinant): What you need to know    Many Vaccine Information Statements are available in Portuguese and other languages. See www.immunize.org/vis  Hojas de Información Sobre Vacunas están disponibles en Español y en muchos otros idiomas. Visite www.immunize.org/vis    1. Why get vaccinated? Influenza (flu) is a contagious disease that spreads around the [place] every year, usually between October and May. Flu is caused by influenza viruses, and is spread mainly by coughing, sneezing, and close contact. Anyone can get flu. Flu strikes suddenly and can last several days. Symptoms vary by age, but can include:   fever/chills   sore throat   muscle aches   fatigue   cough   headache    runny or stuffy nose    Flu can also lead to pneumonia and blood infections, and cause diarrhea and seizures in children. If you have a medical condition, such as heart or lung disease, flu can make it worse. Flu is more dangerous for some people. Infants and young children, people 72years of age and older, pregnant women, and people with certain health conditions or a weakened immune system are at greatest risk. Each year thousands of people in the [facility] die from flu, and many more are hospitalized. Flu vaccine can:   keep you from getting flu,   make flu less severe if you do get it, and   keep you from spreading flu to your family and other people. 2. Inactivated and recombinant flu vaccines    A dose of flu vaccine is recommended every flu season. Children 6 months through 6years of age may need two doses during the same flu season. Everyone else needs only one dose each flu season.        Some inactivated flu vaccines contain a very small amount of a mercury-based preservative called thimerosal. Studies have not shown thimerosal in vaccines to be harmful, but flu vaccines that do not contain thimerosal are available. There is no live flu virus in flu shots. They cannot cause the flu. There are many flu viruses, and they are always changing. Each year a new flu vaccine is made to protect against three or four viruses that are likely to cause disease in the upcoming flu season. But even when the vaccine doesnt exactly match these viruses, it may still provide some protection    Flu vaccine cannot prevent:   flu that is caused by a virus not covered by the vaccine, or   illnesses that look like flu but are not. It takes about 2 weeks for protection to develop after vaccination, and protection lasts through the flu season. 3. Some people should not get this vaccine    Tell the person who is giving you the vaccine:     If you have any severe, life-threatening allergies. If you ever had a life-threatening allergic reaction after a dose of flu vaccine, or have a severe allergy to any part of this vaccine, you may be advised not to get vaccinated. Most, but not all, types of flu vaccine contain a small amount of egg protein.  If you ever had Guillain-Barré Syndrome (also called GBS). Some people with a history of GBS should not get this vaccine. This should be discussed with your doctor.  If you are not feeling well. It is usually okay to get flu vaccine when you have a mild illness, but you might be asked to come back when you feel better. 4. Risks of a vaccine reaction    With any medicine, including vaccines, there is a chance of reactions. These are usually mild and go away on their own, but serious reactions are also possible. Most people who get a flu shot do not have any problems with it.      Minor problems following a flu shot include:    soreness, redness, or swelling where the shot was given     hoarseness   sore, red or itchy eyes   cough   fever   aches   headache   itching   fatigue  If these problems occur, they usually begin soon after the shot and last 1 or 2 days. More serious problems following a flu shot can include the following:     There may be a small increased risk of Guillain-Barré Syndrome (GBS) after inactivated flu vaccine. This risk has been estimated at 1 or 2 additional cases per million people vaccinated. This is much lower than the risk of severe complications from flu, which can be prevented by flu vaccine.  Young children who get the flu shot along with pneumococcal vaccine (PCV13) and/or DTaP vaccine at the same time might be slightly more likely to have a seizure caused by fever. Ask your doctor for more information. Tell your doctor if a child who is getting flu vaccine has ever had a seizure. Problems that could happen after any injected vaccine:      People sometimes faint after a medical procedure, including vaccination. Sitting or lying down for about 15 minutes can help prevent fainting, and injuries caused by a fall. Tell your doctor if you feel dizzy, or have vision changes or ringing in the ears.  Some people get severe pain in the shoulder and have difficulty moving the arm where a shot was given. This happens very rarely.  Any medication can cause a severe allergic reaction. Such reactions from a vaccine are very rare, estimated at about 1 in a million doses, and would happen within a few minutes to a few hours after the vaccination. As with any medicine, there is a very remote chance of a vaccine causing a serious injury or death. The safety of vaccines is always being monitored. For more information, visit: www.cdc.gov/vaccinesafety/    5. What if there is a serious reaction? What should I look for?  Look for anything that concerns you, such as signs of a severe allergic reaction, very high fever, or unusual behavior.     Signs of a severe allergic reaction can include hives, swelling of the face and throat, difficulty breathing, a fast heartbeat, dizziness, and weakness - usually within a few minutes to a few hours after the vaccination. What should I do?  If you think it is a severe allergic reaction or other emergency that cant wait, call 9-1-1 and get the person to the nearest hospital. Otherwise, call your doctor.  Reactions should be reported to the Vaccine Adverse Event Reporting System (VAERS). Your doctor should file this report, or you can do it yourself through  the VAERS web site at www.vaers. [facility].gov, or by calling 1-652.121.6951. VAERS does not give medical advice. 6. The National Vaccine Injury Compensation Program    The [facility] Vaccine Injury Compensation Program (VICP) is a federal program that was created to compensate people who may have been injured by certain vaccines. Persons who believe they may have been injured by a vaccine can learn about the program and about filing a claim by calling 4-558.626.7345 or visiting the [facility] website at www.[facility].gov/vaccinecompensation. There is a time limit to file a claim for compensation. 7. How can I learn more?  Ask your healthcare provider. He or she can give you the vaccine package insert or suggest other sources of information.  Call your local or state health department.  Contact the Centers for Disease Control and Prevention (CDC):  - Call 8-922.342.2471 (1-800-CDC-INFO) or  - Visit CDCs website at www.cdc.gov/flu    Vaccine Information Statement   Inactivated Influenza Vaccine   8/7/2015  42 JOEL Loaiza [id number]    Department of Health and Human Services  Centers for Disease Control and Prevention    Office Use Only 98.4

## 2025-04-02 ENCOUNTER — HOSPITAL ENCOUNTER (OUTPATIENT)
Facility: HOSPITAL | Age: 75
Discharge: HOME | End: 2025-04-05

## 2025-04-04 LAB
T3 SERPL-MCNC: 115 NG/DL (ref 71–180)
T4 FREE SERPL-MCNC: 1.34 NG/DL (ref 0.82–1.77)
TSH SERPL DL<=0.005 MIU/L-ACNC: 0.46 UIU/ML (ref 0.45–4.5)

## 2025-04-14 ENCOUNTER — OFFICE VISIT (OUTPATIENT)
Age: 75
End: 2025-04-14
Payer: MEDICARE

## 2025-04-14 VITALS
SYSTOLIC BLOOD PRESSURE: 140 MMHG | HEIGHT: 71 IN | HEART RATE: 66 BPM | WEIGHT: 184 LBS | BODY MASS INDEX: 25.76 KG/M2 | DIASTOLIC BLOOD PRESSURE: 88 MMHG

## 2025-04-14 DIAGNOSIS — E05.90 SUBCLINICAL HYPERTHYROIDISM: Primary | ICD-10-CM

## 2025-04-14 DIAGNOSIS — E04.2 MULTINODULAR GOITER: ICD-10-CM

## 2025-04-14 PROCEDURE — 3079F DIAST BP 80-89 MM HG: CPT | Performed by: INTERNAL MEDICINE

## 2025-04-14 PROCEDURE — G2211 COMPLEX E/M VISIT ADD ON: HCPCS | Performed by: INTERNAL MEDICINE

## 2025-04-14 PROCEDURE — 99214 OFFICE O/P EST MOD 30 MIN: CPT | Performed by: INTERNAL MEDICINE

## 2025-04-14 PROCEDURE — 1159F MED LIST DOCD IN RCRD: CPT | Performed by: INTERNAL MEDICINE

## 2025-04-14 PROCEDURE — 1160F RVW MEDS BY RX/DR IN RCRD: CPT | Performed by: INTERNAL MEDICINE

## 2025-04-14 PROCEDURE — 1123F ACP DISCUSS/DSCN MKR DOCD: CPT | Performed by: INTERNAL MEDICINE

## 2025-04-14 PROCEDURE — 3077F SYST BP >= 140 MM HG: CPT | Performed by: INTERNAL MEDICINE

## 2025-04-14 PROCEDURE — 1126F AMNT PAIN NOTED NONE PRSNT: CPT | Performed by: INTERNAL MEDICINE

## 2025-04-14 NOTE — PROGRESS NOTES
Chief Complaint   Patient presents with    Thyroid Problem     Pcp and pharmacy verified     History of Present Illness: Rafal Soliz is a 74 y.o. male here for follow up of subclinical hyperthyroidism and Multinodular goiter.  In October 2019, as part of his annual wellness visit he had TFTs drawn. His TSH was 0.050 with a TT4 of 9.7. He was sent for a thyroid Uptake and Scan, which did not show any evidence of hot nodules or hyperactivity, but it did show an area of photopenia in the left lobe.  He had repeat TFTs in January 2020 and his TSH was 0.016 with FT4 of 1.69, TT3 of 124 and Thyroid Ab Panel was negative.  He was then sent for a thyroid US which showed multiple nodules 4.7cm in the left (the region of photopenia and increased blood flow) and 2.5cm in the right lobe.    Pt notes he was first diagnosed with hyperthyroid  \"over 30 years ago\". He has never been treated for hyperthyroidism/subclinical hyperthyroidism.    At our initial visit in March 2020 I started pt on MMI 5mg daily.  I also sent him for an FNA of the 4.7 cm left nodule and the 2.5 cm right nodule.   The pathology on both came back benign. In May 2020.  His repeat thyroid US in May 2021 and April 2022 were unchanged.  His US in April 2022 was unchanged from April 2022.    Pt denies any recent illnesses, injuries or hospitalizations.    His TSH last week was 0.456 with FT4 of 1.34 and TT3 of 115      Pt is still taking the MMI 2.5mg every other day.     \"I am going to the gym to work out every day and other than that I stay at home and I am riding my bike 2-3 hours per day\".    He denies issues of CP, SOB, palpitations (\"my resting HR is in the 50's).   He denies issues of heat or cold intolerance, diarrhea or constipation.  He denies issues of dysphagia or dysphonia.  He denies any changes in his neck. He denies any cervical swelling or LAD.    Current Outpatient Medications   Medication Sig    Multiple Vitamins-Minerals (PRESERVISION

## 2025-05-24 ENCOUNTER — ANESTHESIA (OUTPATIENT)
Facility: HOSPITAL | Age: 75
End: 2025-05-24
Payer: MEDICARE

## 2025-05-24 ENCOUNTER — ANESTHESIA EVENT (OUTPATIENT)
Facility: HOSPITAL | Age: 75
End: 2025-05-24
Payer: MEDICARE

## 2025-05-24 ENCOUNTER — APPOINTMENT (OUTPATIENT)
Facility: HOSPITAL | Age: 75
End: 2025-05-24
Payer: MEDICARE

## 2025-05-24 ENCOUNTER — HOSPITAL ENCOUNTER (EMERGENCY)
Facility: HOSPITAL | Age: 75
Discharge: ANOTHER HOSPITAL | End: 2025-05-24
Attending: EMERGENCY MEDICINE
Payer: MEDICARE

## 2025-05-24 ENCOUNTER — HOSPITAL ENCOUNTER (INPATIENT)
Facility: HOSPITAL | Age: 75
LOS: 3 days | Discharge: HOME | DRG: 399 | End: 2025-05-27
Attending: EMERGENCY MEDICINE | Admitting: SURGERY
Payer: MEDICARE

## 2025-05-24 VITALS
RESPIRATION RATE: 20 BRPM | WEIGHT: 185 LBS | DIASTOLIC BLOOD PRESSURE: 65 MMHG | TEMPERATURE: 99.7 F | HEART RATE: 98 BPM | HEIGHT: 70 IN | SYSTOLIC BLOOD PRESSURE: 129 MMHG | BODY MASS INDEX: 26.48 KG/M2 | OXYGEN SATURATION: 93 %

## 2025-05-24 DIAGNOSIS — K35.32 PERFORATED APPENDIX: Primary | ICD-10-CM

## 2025-05-24 DIAGNOSIS — K35.32 ACUTE PERFORATED APPENDICITIS: Primary | ICD-10-CM

## 2025-05-24 PROBLEM — K35.30 ACUTE APPENDICITIS WITH LOCALIZED PERITONITIS, WITHOUT PERFORATION, ABSCESS, OR GANGRENE: Status: ACTIVE | Noted: 2025-05-24

## 2025-05-24 LAB
ALBUMIN SERPL-MCNC: 3.3 G/DL (ref 3.5–5)
ALBUMIN/GLOB SERPL: 0.9 (ref 1.1–2.2)
ALP SERPL-CCNC: 54 U/L (ref 45–117)
ALT SERPL-CCNC: 20 U/L (ref 12–78)
ANION GAP SERPL CALC-SCNC: 6 MMOL/L (ref 2–12)
APPEARANCE UR: CLEAR
AST SERPL-CCNC: 21 U/L (ref 15–37)
BACTERIA URNS QL MICRO: ABNORMAL /HPF
BASOPHILS # BLD: 0.02 K/UL (ref 0–0.1)
BASOPHILS NFR BLD: 0.1 % (ref 0–1)
BILIRUB SERPL-MCNC: 2.3 MG/DL (ref 0.2–1)
BILIRUB UR QL: NEGATIVE
BUN SERPL-MCNC: 26 MG/DL (ref 6–20)
BUN/CREAT SERPL: 22 (ref 12–20)
CALCIUM SERPL-MCNC: 9 MG/DL (ref 8.5–10.1)
CHLORIDE SERPL-SCNC: 97 MMOL/L (ref 97–108)
CO2 SERPL-SCNC: 31 MMOL/L (ref 21–32)
COLOR UR: ABNORMAL
CREAT SERPL-MCNC: 1.2 MG/DL (ref 0.7–1.3)
DIFFERENTIAL METHOD BLD: ABNORMAL
EKG ATRIAL RATE: 93 BPM
EKG DIAGNOSIS: NORMAL
EKG P AXIS: 58 DEGREES
EKG P-R INTERVAL: 136 MS
EKG Q-T INTERVAL: 378 MS
EKG QRS DURATION: 146 MS
EKG QTC CALCULATION (BAZETT): 469 MS
EKG R AXIS: 49 DEGREES
EKG T AXIS: 16 DEGREES
EKG VENTRICULAR RATE: 93 BPM
EOSINOPHIL # BLD: 0.01 K/UL (ref 0–0.4)
EOSINOPHIL NFR BLD: 0.1 % (ref 0–7)
EPITH CASTS URNS QL MICRO: ABNORMAL /LPF
ERYTHROCYTE [DISTWIDTH] IN BLOOD BY AUTOMATED COUNT: 12.9 % (ref 11.5–14.5)
GLOBULIN SER CALC-MCNC: 3.8 G/DL (ref 2–4)
GLUCOSE BLD STRIP.AUTO-MCNC: 113 MG/DL (ref 65–117)
GLUCOSE BLD STRIP.AUTO-MCNC: 115 MG/DL (ref 65–117)
GLUCOSE SERPL-MCNC: 140 MG/DL (ref 65–100)
GLUCOSE UR STRIP.AUTO-MCNC: NEGATIVE MG/DL
HCT VFR BLD AUTO: 38.5 % (ref 36.6–50.3)
HGB BLD-MCNC: 13.4 G/DL (ref 12.1–17)
HGB UR QL STRIP: ABNORMAL
IMM GRANULOCYTES # BLD AUTO: 0.06 K/UL (ref 0–0.04)
IMM GRANULOCYTES NFR BLD AUTO: 0.4 % (ref 0–0.5)
KETONES UR QL STRIP.AUTO: 15 MG/DL
LACTATE SERPL-SCNC: 0.7 MMOL/L (ref 0.4–2)
LEUKOCYTE ESTERASE UR QL STRIP.AUTO: ABNORMAL
LIPASE SERPL-CCNC: 15 U/L (ref 13–75)
LYMPHOCYTES # BLD: 1.44 K/UL (ref 0.8–3.5)
LYMPHOCYTES NFR BLD: 9.7 % (ref 12–49)
MAGNESIUM SERPL-MCNC: 2.1 MG/DL (ref 1.6–2.4)
MCH RBC QN AUTO: 29.2 PG (ref 26–34)
MCHC RBC AUTO-ENTMCNC: 34.8 G/DL (ref 30–36.5)
MCV RBC AUTO: 83.9 FL (ref 80–99)
MONOCYTES # BLD: 0.8 K/UL (ref 0–1)
MONOCYTES NFR BLD: 5.4 % (ref 5–13)
NEUTS SEG # BLD: 12.46 K/UL (ref 1.8–8)
NEUTS SEG NFR BLD: 84.3 % (ref 32–75)
NITRITE UR QL STRIP.AUTO: NEGATIVE
NRBC # BLD: 0 K/UL (ref 0–0.01)
NRBC BLD-RTO: 0 PER 100 WBC
PH UR STRIP: 6 (ref 5–8)
PLATELET # BLD AUTO: 178 K/UL (ref 150–400)
PMV BLD AUTO: 8.3 FL (ref 8.9–12.9)
POTASSIUM SERPL-SCNC: 3.3 MMOL/L (ref 3.5–5.1)
PROT SERPL-MCNC: 7.1 G/DL (ref 6.4–8.2)
PROT UR STRIP-MCNC: ABNORMAL MG/DL
RBC # BLD AUTO: 4.59 M/UL (ref 4.1–5.7)
RBC #/AREA URNS HPF: ABNORMAL /HPF (ref 0–5)
SERVICE CMNT-IMP: NORMAL
SERVICE CMNT-IMP: NORMAL
SODIUM SERPL-SCNC: 134 MMOL/L (ref 136–145)
SP GR UR REFRACTOMETRY: 1.01 (ref 1–1.03)
TROPONIN I SERPL HS-MCNC: 15 NG/L (ref 0–76)
URINE CULTURE IF INDICATED: ABNORMAL
UROBILINOGEN UR QL STRIP.AUTO: 0.2 EU/DL (ref 0.2–1)
WBC # BLD AUTO: 14.8 K/UL (ref 4.1–11.1)
WBC URNS QL MICRO: ABNORMAL /HPF (ref 0–4)

## 2025-05-24 PROCEDURE — 99285 EMERGENCY DEPT VISIT HI MDM: CPT

## 2025-05-24 PROCEDURE — 7100000001 HC PACU RECOVERY - ADDTL 15 MIN: Performed by: SURGERY

## 2025-05-24 PROCEDURE — 3600000014 HC SURGERY LEVEL 4 ADDTL 15MIN: Performed by: SURGERY

## 2025-05-24 PROCEDURE — 6360000002 HC RX W HCPCS: Performed by: NURSE ANESTHETIST, CERTIFIED REGISTERED

## 2025-05-24 PROCEDURE — 3700000001 HC ADD 15 MINUTES (ANESTHESIA): Performed by: SURGERY

## 2025-05-24 PROCEDURE — 2580000003 HC RX 258: Performed by: NURSE ANESTHETIST, CERTIFIED REGISTERED

## 2025-05-24 PROCEDURE — 85025 COMPLETE CBC W/AUTO DIFF WBC: CPT

## 2025-05-24 PROCEDURE — 96367 TX/PROPH/DG ADDL SEQ IV INF: CPT

## 2025-05-24 PROCEDURE — 96365 THER/PROPH/DIAG IV INF INIT: CPT

## 2025-05-24 PROCEDURE — 2700000000 HC OXYGEN THERAPY PER DAY

## 2025-05-24 PROCEDURE — 0DTJ4ZZ RESECTION OF APPENDIX, PERCUTANEOUS ENDOSCOPIC APPROACH: ICD-10-PCS | Performed by: SURGERY

## 2025-05-24 PROCEDURE — 6360000002 HC RX W HCPCS: Performed by: SURGERY

## 2025-05-24 PROCEDURE — 74177 CT ABD & PELVIS W/CONTRAST: CPT

## 2025-05-24 PROCEDURE — 2709999900 HC NON-CHARGEABLE SUPPLY: Performed by: SURGERY

## 2025-05-24 PROCEDURE — 2720000010 HC SURG SUPPLY STERILE: Performed by: SURGERY

## 2025-05-24 PROCEDURE — 83605 ASSAY OF LACTIC ACID: CPT

## 2025-05-24 PROCEDURE — 1100000000 HC RM PRIVATE

## 2025-05-24 PROCEDURE — 82962 GLUCOSE BLOOD TEST: CPT

## 2025-05-24 PROCEDURE — 3600000004 HC SURGERY LEVEL 4 BASE: Performed by: SURGERY

## 2025-05-24 PROCEDURE — 2500000003 HC RX 250 WO HCPCS: Performed by: NURSE ANESTHETIST, CERTIFIED REGISTERED

## 2025-05-24 PROCEDURE — 44970 LAPAROSCOPY APPENDECTOMY: CPT | Performed by: SURGERY

## 2025-05-24 PROCEDURE — 3700000000 HC ANESTHESIA ATTENDED CARE: Performed by: SURGERY

## 2025-05-24 PROCEDURE — 81001 URINALYSIS AUTO W/SCOPE: CPT

## 2025-05-24 PROCEDURE — 87205 SMEAR GRAM STAIN: CPT

## 2025-05-24 PROCEDURE — 99221 1ST HOSP IP/OBS SF/LOW 40: CPT | Performed by: SURGERY

## 2025-05-24 PROCEDURE — 87070 CULTURE OTHR SPECIMN AEROBIC: CPT

## 2025-05-24 PROCEDURE — 2580000003 HC RX 258: Performed by: EMERGENCY MEDICINE

## 2025-05-24 PROCEDURE — 84484 ASSAY OF TROPONIN QUANT: CPT

## 2025-05-24 PROCEDURE — 87075 CULTR BACTERIA EXCEPT BLOOD: CPT

## 2025-05-24 PROCEDURE — 88304 TISSUE EXAM BY PATHOLOGIST: CPT

## 2025-05-24 PROCEDURE — 6370000000 HC RX 637 (ALT 250 FOR IP): Performed by: SURGERY

## 2025-05-24 PROCEDURE — 6360000002 HC RX W HCPCS: Performed by: EMERGENCY MEDICINE

## 2025-05-24 PROCEDURE — 2580000003 HC RX 258: Performed by: SURGERY

## 2025-05-24 PROCEDURE — 83690 ASSAY OF LIPASE: CPT

## 2025-05-24 PROCEDURE — 36415 COLL VENOUS BLD VENIPUNCTURE: CPT

## 2025-05-24 PROCEDURE — 87186 SC STD MICRODIL/AGAR DIL: CPT

## 2025-05-24 PROCEDURE — 80053 COMPREHEN METABOLIC PANEL: CPT

## 2025-05-24 PROCEDURE — 87077 CULTURE AEROBIC IDENTIFY: CPT

## 2025-05-24 PROCEDURE — 83735 ASSAY OF MAGNESIUM: CPT

## 2025-05-24 PROCEDURE — 2500000003 HC RX 250 WO HCPCS: Performed by: SURGERY

## 2025-05-24 PROCEDURE — 93005 ELECTROCARDIOGRAM TRACING: CPT

## 2025-05-24 PROCEDURE — 7100000000 HC PACU RECOVERY - FIRST 15 MIN: Performed by: SURGERY

## 2025-05-24 PROCEDURE — 51702 INSERT TEMP BLADDER CATH: CPT

## 2025-05-24 PROCEDURE — 6360000004 HC RX CONTRAST MEDICATION: Performed by: EMERGENCY MEDICINE

## 2025-05-24 RX ORDER — SODIUM CHLORIDE 0.9 % (FLUSH) 0.9 %
5-40 SYRINGE (ML) INJECTION EVERY 12 HOURS SCHEDULED
Status: DISCONTINUED | OUTPATIENT
Start: 2025-05-24 | End: 2025-05-27 | Stop reason: HOSPADM

## 2025-05-24 RX ORDER — METRONIDAZOLE 500 MG/100ML
500 INJECTION, SOLUTION INTRAVENOUS
Status: COMPLETED | OUTPATIENT
Start: 2025-05-24 | End: 2025-05-24

## 2025-05-24 RX ORDER — ONDANSETRON 2 MG/ML
4 INJECTION INTRAMUSCULAR; INTRAVENOUS EVERY 6 HOURS PRN
Status: DISCONTINUED | OUTPATIENT
Start: 2025-05-24 | End: 2025-05-24 | Stop reason: SDUPTHER

## 2025-05-24 RX ORDER — SODIUM CHLORIDE, SODIUM LACTATE, POTASSIUM CHLORIDE, CALCIUM CHLORIDE 600; 310; 30; 20 MG/100ML; MG/100ML; MG/100ML; MG/100ML
INJECTION, SOLUTION INTRAVENOUS
Status: DISCONTINUED | OUTPATIENT
Start: 2025-05-24 | End: 2025-05-24 | Stop reason: SDUPTHER

## 2025-05-24 RX ORDER — ONDANSETRON 2 MG/ML
INJECTION INTRAMUSCULAR; INTRAVENOUS
Status: DISCONTINUED | OUTPATIENT
Start: 2025-05-24 | End: 2025-05-24 | Stop reason: SDUPTHER

## 2025-05-24 RX ORDER — LEVOFLOXACIN 5 MG/ML
750 INJECTION, SOLUTION INTRAVENOUS
Status: COMPLETED | OUTPATIENT
Start: 2025-05-24 | End: 2025-05-24

## 2025-05-24 RX ORDER — HYDRALAZINE HYDROCHLORIDE 20 MG/ML
10 INJECTION INTRAMUSCULAR; INTRAVENOUS EVERY 6 HOURS PRN
Status: DISCONTINUED | OUTPATIENT
Start: 2025-05-24 | End: 2025-05-27 | Stop reason: HOSPADM

## 2025-05-24 RX ORDER — FENTANYL CITRATE 50 UG/ML
50 INJECTION, SOLUTION INTRAMUSCULAR; INTRAVENOUS EVERY 5 MIN PRN
Status: DISCONTINUED | OUTPATIENT
Start: 2025-05-24 | End: 2025-05-24 | Stop reason: HOSPADM

## 2025-05-24 RX ORDER — SODIUM CHLORIDE 9 MG/ML
INJECTION, SOLUTION INTRAVENOUS PRN
Status: DISCONTINUED | OUTPATIENT
Start: 2025-05-24 | End: 2025-05-24 | Stop reason: HOSPADM

## 2025-05-24 RX ORDER — PROPOFOL 10 MG/ML
INJECTION, EMULSION INTRAVENOUS
Status: DISCONTINUED | OUTPATIENT
Start: 2025-05-24 | End: 2025-05-24 | Stop reason: SDUPTHER

## 2025-05-24 RX ORDER — BUPIVACAINE HYDROCHLORIDE 5 MG/ML
INJECTION, SOLUTION EPIDURAL; INTRACAUDAL PRN
Status: DISCONTINUED | OUTPATIENT
Start: 2025-05-24 | End: 2025-05-24 | Stop reason: ALTCHOICE

## 2025-05-24 RX ORDER — DEXTROSE MONOHYDRATE, SODIUM CHLORIDE, AND POTASSIUM CHLORIDE 50; 1.49; 4.5 G/1000ML; G/1000ML; G/1000ML
INJECTION, SOLUTION INTRAVENOUS CONTINUOUS
Status: DISCONTINUED | OUTPATIENT
Start: 2025-05-24 | End: 2025-05-27

## 2025-05-24 RX ORDER — PHENYLEPHRINE HCL IN 0.9% NACL 0.4MG/10ML
SYRINGE (ML) INTRAVENOUS
Status: DISCONTINUED | OUTPATIENT
Start: 2025-05-24 | End: 2025-05-24 | Stop reason: SDUPTHER

## 2025-05-24 RX ORDER — POTASSIUM CHLORIDE 1500 MG/1
10 TABLET, EXTENDED RELEASE ORAL DAILY
Status: DISCONTINUED | OUTPATIENT
Start: 2025-05-24 | End: 2025-05-27 | Stop reason: HOSPADM

## 2025-05-24 RX ORDER — CHLORTHALIDONE 25 MG/1
25 TABLET ORAL DAILY
Status: DISCONTINUED | OUTPATIENT
Start: 2025-05-24 | End: 2025-05-27 | Stop reason: HOSPADM

## 2025-05-24 RX ORDER — IOPAMIDOL 755 MG/ML
100 INJECTION, SOLUTION INTRAVASCULAR
Status: COMPLETED | OUTPATIENT
Start: 2025-05-24 | End: 2025-05-24

## 2025-05-24 RX ORDER — SODIUM CHLORIDE 0.9 % (FLUSH) 0.9 %
5-40 SYRINGE (ML) INJECTION PRN
Status: DISCONTINUED | OUTPATIENT
Start: 2025-05-24 | End: 2025-05-27 | Stop reason: HOSPADM

## 2025-05-24 RX ORDER — LOSARTAN POTASSIUM 50 MG/1
50 TABLET ORAL DAILY
Status: DISCONTINUED | OUTPATIENT
Start: 2025-05-24 | End: 2025-05-27 | Stop reason: HOSPADM

## 2025-05-24 RX ORDER — ATORVASTATIN CALCIUM 40 MG/1
40 TABLET, FILM COATED ORAL DAILY
Status: DISCONTINUED | OUTPATIENT
Start: 2025-05-24 | End: 2025-05-27 | Stop reason: HOSPADM

## 2025-05-24 RX ORDER — LIDOCAINE HYDROCHLORIDE 20 MG/ML
INJECTION, SOLUTION EPIDURAL; INFILTRATION; INTRACAUDAL; PERINEURAL
Status: DISCONTINUED | OUTPATIENT
Start: 2025-05-24 | End: 2025-05-24 | Stop reason: SDUPTHER

## 2025-05-24 RX ORDER — SODIUM CHLORIDE 0.9 % (FLUSH) 0.9 %
5-40 SYRINGE (ML) INJECTION EVERY 12 HOURS SCHEDULED
Status: DISCONTINUED | OUTPATIENT
Start: 2025-05-24 | End: 2025-05-24 | Stop reason: HOSPADM

## 2025-05-24 RX ORDER — ONDANSETRON 2 MG/ML
4 INJECTION INTRAMUSCULAR; INTRAVENOUS
Status: DISCONTINUED | OUTPATIENT
Start: 2025-05-24 | End: 2025-05-24 | Stop reason: HOSPADM

## 2025-05-24 RX ORDER — 0.9 % SODIUM CHLORIDE 0.9 %
1000 INTRAVENOUS SOLUTION INTRAVENOUS ONCE
Status: COMPLETED | OUTPATIENT
Start: 2025-05-24 | End: 2025-05-24

## 2025-05-24 RX ORDER — NALOXONE HYDROCHLORIDE 0.4 MG/ML
INJECTION, SOLUTION INTRAMUSCULAR; INTRAVENOUS; SUBCUTANEOUS PRN
Status: DISCONTINUED | OUTPATIENT
Start: 2025-05-24 | End: 2025-05-24 | Stop reason: HOSPADM

## 2025-05-24 RX ORDER — METOPROLOL SUCCINATE 25 MG/1
25 TABLET, EXTENDED RELEASE ORAL DAILY
Status: DISCONTINUED | OUTPATIENT
Start: 2025-05-24 | End: 2025-05-27 | Stop reason: HOSPADM

## 2025-05-24 RX ORDER — ONDANSETRON 2 MG/ML
4 INJECTION INTRAMUSCULAR; INTRAVENOUS EVERY 6 HOURS PRN
Status: DISCONTINUED | OUTPATIENT
Start: 2025-05-24 | End: 2025-05-27 | Stop reason: HOSPADM

## 2025-05-24 RX ORDER — MAGNESIUM HYDROXIDE/ALUMINUM HYDROXICE/SIMETHICONE 120; 1200; 1200 MG/30ML; MG/30ML; MG/30ML
30 SUSPENSION ORAL EVERY 6 HOURS PRN
Status: DISCONTINUED | OUTPATIENT
Start: 2025-05-24 | End: 2025-05-27 | Stop reason: HOSPADM

## 2025-05-24 RX ORDER — HYDROMORPHONE HYDROCHLORIDE 1 MG/ML
0.5 INJECTION, SOLUTION INTRAMUSCULAR; INTRAVENOUS; SUBCUTANEOUS EVERY 4 HOURS PRN
Status: DISCONTINUED | OUTPATIENT
Start: 2025-05-24 | End: 2025-05-27 | Stop reason: HOSPADM

## 2025-05-24 RX ORDER — HYDROMORPHONE HYDROCHLORIDE 1 MG/ML
0.5 INJECTION, SOLUTION INTRAMUSCULAR; INTRAVENOUS; SUBCUTANEOUS EVERY 5 MIN PRN
Status: DISCONTINUED | OUTPATIENT
Start: 2025-05-24 | End: 2025-05-24 | Stop reason: HOSPADM

## 2025-05-24 RX ORDER — MEPERIDINE HYDROCHLORIDE 25 MG/ML
12.5 INJECTION INTRAMUSCULAR; INTRAVENOUS; SUBCUTANEOUS EVERY 5 MIN PRN
Status: DISCONTINUED | OUTPATIENT
Start: 2025-05-24 | End: 2025-05-24 | Stop reason: HOSPADM

## 2025-05-24 RX ORDER — FENTANYL CITRATE 50 UG/ML
INJECTION, SOLUTION INTRAMUSCULAR; INTRAVENOUS
Status: DISCONTINUED | OUTPATIENT
Start: 2025-05-24 | End: 2025-05-24 | Stop reason: SDUPTHER

## 2025-05-24 RX ORDER — ONDANSETRON 4 MG/1
4 TABLET, ORALLY DISINTEGRATING ORAL EVERY 8 HOURS PRN
Status: DISCONTINUED | OUTPATIENT
Start: 2025-05-24 | End: 2025-05-27 | Stop reason: HOSPADM

## 2025-05-24 RX ORDER — ENOXAPARIN SODIUM 100 MG/ML
40 INJECTION SUBCUTANEOUS DAILY
Status: DISCONTINUED | OUTPATIENT
Start: 2025-05-24 | End: 2025-05-27 | Stop reason: HOSPADM

## 2025-05-24 RX ORDER — ACETAMINOPHEN 325 MG/1
650 TABLET ORAL EVERY 4 HOURS PRN
Status: DISCONTINUED | OUTPATIENT
Start: 2025-05-24 | End: 2025-05-27 | Stop reason: HOSPADM

## 2025-05-24 RX ORDER — METHIMAZOLE 5 MG/1
2.5 TABLET ORAL EVERY OTHER DAY
Status: DISCONTINUED | OUTPATIENT
Start: 2025-05-25 | End: 2025-05-27 | Stop reason: HOSPADM

## 2025-05-24 RX ORDER — PROCHLORPERAZINE EDISYLATE 5 MG/ML
5 INJECTION INTRAMUSCULAR; INTRAVENOUS
Status: DISCONTINUED | OUTPATIENT
Start: 2025-05-24 | End: 2025-05-24 | Stop reason: HOSPADM

## 2025-05-24 RX ORDER — SODIUM CHLORIDE 0.9 % (FLUSH) 0.9 %
5-40 SYRINGE (ML) INJECTION PRN
Status: DISCONTINUED | OUTPATIENT
Start: 2025-05-24 | End: 2025-05-24 | Stop reason: HOSPADM

## 2025-05-24 RX ORDER — SUCCINYLCHOLINE/SOD CL,ISO/PF 200MG/10ML
SYRINGE (ML) INTRAVENOUS
Status: DISCONTINUED | OUTPATIENT
Start: 2025-05-24 | End: 2025-05-24 | Stop reason: SDUPTHER

## 2025-05-24 RX ORDER — DEXAMETHASONE SODIUM PHOSPHATE 4 MG/ML
INJECTION, SOLUTION INTRA-ARTICULAR; INTRALESIONAL; INTRAMUSCULAR; INTRAVENOUS; SOFT TISSUE
Status: DISCONTINUED | OUTPATIENT
Start: 2025-05-24 | End: 2025-05-24 | Stop reason: SDUPTHER

## 2025-05-24 RX ORDER — ROCURONIUM BROMIDE 10 MG/ML
INJECTION, SOLUTION INTRAVENOUS
Status: DISCONTINUED | OUTPATIENT
Start: 2025-05-24 | End: 2025-05-24 | Stop reason: SDUPTHER

## 2025-05-24 RX ORDER — SODIUM CHLORIDE 9 MG/ML
INJECTION, SOLUTION INTRAVENOUS PRN
Status: DISCONTINUED | OUTPATIENT
Start: 2025-05-24 | End: 2025-05-27 | Stop reason: HOSPADM

## 2025-05-24 RX ADMIN — SODIUM CHLORIDE, POTASSIUM CHLORIDE, SODIUM LACTATE AND CALCIUM CHLORIDE: 600; 310; 30; 20 INJECTION, SOLUTION INTRAVENOUS at 18:26

## 2025-05-24 RX ADMIN — PROPOFOL 30 MG: 10 INJECTION, EMULSION INTRAVENOUS at 19:29

## 2025-05-24 RX ADMIN — IOPAMIDOL 100 ML: 755 INJECTION, SOLUTION INTRAVENOUS at 11:24

## 2025-05-24 RX ADMIN — ATORVASTATIN CALCIUM 40 MG: 40 TABLET, FILM COATED ORAL at 21:54

## 2025-05-24 RX ADMIN — SODIUM CHLORIDE: 0.9 INJECTION, SOLUTION INTRAVENOUS at 21:59

## 2025-05-24 RX ADMIN — DEXAMETHASONE SODIUM PHOSPHATE 8 MG: 4 INJECTION INTRA-ARTICULAR; INTRALESIONAL; INTRAMUSCULAR; INTRAVENOUS; SOFT TISSUE at 18:42

## 2025-05-24 RX ADMIN — PROPOFOL 100 MG: 10 INJECTION, EMULSION INTRAVENOUS at 18:36

## 2025-05-24 RX ADMIN — Medication 140 MG: at 18:36

## 2025-05-24 RX ADMIN — ENOXAPARIN SODIUM 40 MG: 100 INJECTION SUBCUTANEOUS at 21:54

## 2025-05-24 RX ADMIN — PIPERACILLIN AND TAZOBACTAM 3375 MG: 3; .375 INJECTION, POWDER, LYOPHILIZED, FOR SOLUTION INTRAVENOUS at 18:42

## 2025-05-24 RX ADMIN — HYDROMORPHONE HYDROCHLORIDE 0.5 MG: 1 INJECTION, SOLUTION INTRAMUSCULAR; INTRAVENOUS; SUBCUTANEOUS at 18:30

## 2025-05-24 RX ADMIN — METRONIDAZOLE 500 MG: 500 INJECTION, SOLUTION INTRAVENOUS at 12:10

## 2025-05-24 RX ADMIN — FENTANYL CITRATE 25 MCG: 50 INJECTION, SOLUTION INTRAMUSCULAR; INTRAVENOUS at 19:21

## 2025-05-24 RX ADMIN — ROCURONIUM BROMIDE 10 MG: 10 INJECTION INTRAVENOUS at 18:36

## 2025-05-24 RX ADMIN — PIPERACILLIN AND TAZOBACTAM 4500 MG: 4; .5 INJECTION, POWDER, LYOPHILIZED, FOR SOLUTION INTRAVENOUS at 22:00

## 2025-05-24 RX ADMIN — Medication 120 MCG: at 18:48

## 2025-05-24 RX ADMIN — ROCURONIUM BROMIDE 30 MG: 10 INJECTION INTRAVENOUS at 18:41

## 2025-05-24 RX ADMIN — SODIUM CHLORIDE 1000 ML: 0.9 INJECTION, SOLUTION INTRAVENOUS at 16:08

## 2025-05-24 RX ADMIN — FENTANYL CITRATE 25 MCG: 50 INJECTION, SOLUTION INTRAMUSCULAR; INTRAVENOUS at 18:43

## 2025-05-24 RX ADMIN — Medication 80 MCG: at 18:45

## 2025-05-24 RX ADMIN — SUGAMMADEX 200 MG: 100 INJECTION, SOLUTION INTRAVENOUS at 19:17

## 2025-05-24 RX ADMIN — DEXTROSE MONOHYDRATE, SODIUM CHLORIDE, AND POTASSIUM CHLORIDE: 50; 4.5; 1.49 INJECTION, SOLUTION INTRAVENOUS at 21:53

## 2025-05-24 RX ADMIN — ONDANSETRON 4 MG: 2 INJECTION, SOLUTION INTRAMUSCULAR; INTRAVENOUS at 19:15

## 2025-05-24 RX ADMIN — METOPROLOL SUCCINATE 25 MG: 25 TABLET, FILM COATED, EXTENDED RELEASE ORAL at 21:54

## 2025-05-24 RX ADMIN — SODIUM CHLORIDE, POTASSIUM CHLORIDE, SODIUM LACTATE AND CALCIUM CHLORIDE: 600; 310; 30; 20 INJECTION, SOLUTION INTRAVENOUS at 19:25

## 2025-05-24 RX ADMIN — FENTANYL CITRATE 50 MCG: 50 INJECTION, SOLUTION INTRAMUSCULAR; INTRAVENOUS at 19:22

## 2025-05-24 RX ADMIN — HYDROMORPHONE HYDROCHLORIDE 0.5 MG: 1 INJECTION, SOLUTION INTRAMUSCULAR; INTRAVENOUS; SUBCUTANEOUS at 22:59

## 2025-05-24 RX ADMIN — POTASSIUM CHLORIDE 10 MEQ: 1500 TABLET, EXTENDED RELEASE ORAL at 21:55

## 2025-05-24 RX ADMIN — PROPOFOL 20 MG: 10 INJECTION, EMULSION INTRAVENOUS at 19:22

## 2025-05-24 RX ADMIN — LEVOFLOXACIN 750 MG: 750 INJECTION, SOLUTION INTRAVENOUS at 13:19

## 2025-05-24 RX ADMIN — LOSARTAN POTASSIUM 50 MG: 50 TABLET, FILM COATED ORAL at 21:54

## 2025-05-24 RX ADMIN — LIDOCAINE HYDROCHLORIDE 100 MG: 20 INJECTION, SOLUTION EPIDURAL; INFILTRATION; INTRACAUDAL; PERINEURAL at 18:33

## 2025-05-24 ASSESSMENT — ENCOUNTER SYMPTOMS
NAUSEA: 0
DIARRHEA: 0
COUGH: 0
ABDOMINAL PAIN: 1
VOMITING: 0
EYE REDNESS: 0
SHORTNESS OF BREATH: 0
SORE THROAT: 0

## 2025-05-24 ASSESSMENT — PAIN - FUNCTIONAL ASSESSMENT
PAIN_FUNCTIONAL_ASSESSMENT: 0-10
PAIN_FUNCTIONAL_ASSESSMENT: ACTIVITIES ARE NOT PREVENTED
PAIN_FUNCTIONAL_ASSESSMENT: ACTIVITIES ARE NOT PREVENTED

## 2025-05-24 ASSESSMENT — PAIN DESCRIPTION - DESCRIPTORS
DESCRIPTORS: PRESSURE;ACHING
DESCRIPTORS: PRESSURE
DESCRIPTORS: DISCOMFORT;PATIENT UNABLE TO DESCRIBE
DESCRIPTORS: DISCOMFORT

## 2025-05-24 ASSESSMENT — PAIN SCALES - GENERAL
PAINLEVEL_OUTOF10: 8
PAINLEVEL_OUTOF10: 10
PAINLEVEL_OUTOF10: 6
PAINLEVEL_OUTOF10: 2
PAINLEVEL_OUTOF10: 4

## 2025-05-24 ASSESSMENT — PAIN DESCRIPTION - ORIENTATION
ORIENTATION: LOWER;MID
ORIENTATION: LOWER;MID
ORIENTATION: RIGHT;LOWER
ORIENTATION: ANTERIOR

## 2025-05-24 ASSESSMENT — PAIN DESCRIPTION - LOCATION
LOCATION: ABDOMEN

## 2025-05-24 ASSESSMENT — LIFESTYLE VARIABLES
HOW MANY STANDARD DRINKS CONTAINING ALCOHOL DO YOU HAVE ON A TYPICAL DAY: PATIENT DOES NOT DRINK
HOW OFTEN DO YOU HAVE A DRINK CONTAINING ALCOHOL: NEVER
HOW MANY STANDARD DRINKS CONTAINING ALCOHOL DO YOU HAVE ON A TYPICAL DAY: PATIENT DOES NOT DRINK
HOW OFTEN DO YOU HAVE A DRINK CONTAINING ALCOHOL: NEVER

## 2025-05-24 ASSESSMENT — PAIN DESCRIPTION - PAIN TYPE
TYPE: ACUTE PAIN;SURGICAL PAIN
TYPE: SURGICAL PAIN
TYPE: ACUTE PAIN

## 2025-05-24 ASSESSMENT — PAIN DESCRIPTION - ONSET
ONSET: SUDDEN
ONSET: ON-GOING

## 2025-05-24 ASSESSMENT — PAIN DESCRIPTION - FREQUENCY: FREQUENCY: CONTINUOUS

## 2025-05-24 NOTE — ANESTHESIA PRE PROCEDURE
Department of Anesthesiology  Preprocedure Note       Name:  Rafal Soliz   Age:  74 y.o.  :  1950                                          MRN:  [id number]         Date:  2025      Surgeon: Surgeon(s):  Richie Parker MD    Procedure: Procedure(s):  APPENDECTOMY LAPAROSCOPIC    Medications prior to admission:   Prior to Admission medications    Medication Sig Start Date End Date Taking? Authorizing Provider   Multiple Vitamins-Minerals (PRESERVISION AREDS 2 PO) Take by mouth daily    ProviderAlvaro MD   atorvastatin (LIPITOR) 40 MG tablet Take 1 tablet by mouth daily 25   Richie Aguilar MD   chlorthalidone (HYGROTON) 25 MG tablet Take 1 tablet by mouth daily 25   Richie Aguilar MD   diclofenac (VOLTAREN) 75 MG EC tablet Take 1 tablet by mouth 2 times daily 25   Richie Aguilar MD   methIMAzole (TAPAZOLE) 5 MG tablet Take 0.5 tablets by mouth every other day 25   Richie Aguilar MD   metoprolol succinate (TOPROL XL) 25 MG extended release tablet Take 1 tablet by mouth daily 25   Richie Aguilar MD   olmesartan (BENICAR) 20 MG tablet Take 1 tablet by mouth daily 25   Richie Aguilar MD   potassium chloride (KLOR-CON M) 10 MEQ extended release tablet Take 1 tablet by mouth daily 25   Richie Aguilar MD   Omega-3 Fatty Acids (FISH OIL PO) Take by mouth in the morning and at bedtime    Alvaro Lieberman MD   ascorbic acid (VITAMIN C) 500 MG tablet Take 2 tablets by mouth daily    Automatic Reconciliation, Ar   Cholecalciferol 50 MCG (2000) TABS Take by mouth    Automatic Reconciliation, Ar       Current medications:    Current Facility-Administered Medications   Medication Dose Route Frequency Provider Last Rate Last Admin   • HYDROmorphone HCl PF (DILAUDID) injection 0.5 mg  0.5 mg IntraVENous Q4H PRN Richie Parker MD       • ondansetron (ZOFRAN) injection 4 mg  4 mg IntraVENous Q6H PRN Richie Parker MD         Current Outpatient

## 2025-05-24 NOTE — ED NOTES
TRANSFER - OUT REPORT:    Verbal report given to ELIDA Godfrey on Rafal Soliz  being transferred to PACU for ordered procedure       Report consisted of patient's Situation, Background, Assessment and   Recommendations(SBAR).     Information from the following report(s) Nurse Handoff Report, Index, ED Encounter Summary, ED SBAR, Adult Overview, MAR, Recent Results, and Cardiac Rhythm NSR  was reviewed with the receiving nurse.    [place] Fall Assessment:    Presents to emergency department  because of falls (Syncope, seizure, or loss of consciousness): No  Age > 70: Yes  Altered Mental Status, Intoxication with alcohol or substance confusion (Disorientation, impaired judgment, poor safety awaremess, or inability to follow instructions): No  Impaired Mobility: Ambulates or transfers with assistive devices or assistance; Unable to ambulate or transer.: No  Nursing Judgement: Yes          Lines:   Peripheral IV 05/24/25 Distal;Left;Anterior Cephalic (Active)        Opportunity for questions and clarification was provided.      Patient transported with:  Monitor and Registered Nurse

## 2025-05-24 NOTE — ED NOTES
TRANSFER - OUT REPORT:    Verbal report given to priscilla Rowe on Rafal Soliz  being transferred to [facility] for urgent transfer       Report consisted of patient's Situation, Background, Assessment and   Recommendations(SBAR).     Information from the following report(s) Nurse Handoff Report, ED SBAR, MAR, Recent Results, and Neuro Assessment was reviewed with the receiving nurse.    [place] Fall Assessment:    Presents to emergency department  because of falls (Syncope, seizure, or loss of consciousness): No  Age > 70: No  Altered Mental Status, Intoxication with alcohol or substance confusion (Disorientation, impaired judgment, poor safety awaremess, or inability to follow instructions): No  Impaired Mobility: Ambulates or transfers with assistive devices or assistance; Unable to ambulate or transer.: No  Nursing Judgement: No          Lines:   Peripheral IV 05/24/25 Distal;Left;Anterior Cephalic (Active)        Opportunity for questions and clarification was provided.      Patient transported with:  Monitor, Patient-specific medications from Pharmacy, and Tech

## 2025-05-24 NOTE — ED NOTES
TRANSFER - OUT REPORT:    Verbal report given to Ronen Meyer RN on Rafal Soliz  being transferred to [facility] for urgent transfer       Report consisted of patient's Situation, Background, Assessment and   Recommendations(SBAR).     Information from the following report(s) Nurse Handoff Report, ED SBAR, MAR, Recent Results, and Neuro Assessment was reviewed with the receiving nurse.    [place] Fall Assessment:    Presents to emergency department  because of falls (Syncope, seizure, or loss of consciousness): No  Age > 70: No  Altered Mental Status, Intoxication with alcohol or substance confusion (Disorientation, impaired judgment, poor safety awaremess, or inability to follow instructions): No  Impaired Mobility: Ambulates or transfers with assistive devices or assistance; Unable to ambulate or transer.: No  Nursing Judgement: No          Lines:   Peripheral IV 05/24/25 Distal;Left;Anterior Cephalic (Active)        Opportunity for questions and clarification was provided.      Patient transported with:  Monitor, Patient-specific medications from Pharmacy, and Tech

## 2025-05-24 NOTE — ED TRIAGE NOTES
Hypogastric cramping/ pain that began Thursday. Reports nausea without vomiting. Endorses regular bowl movements.   Poor appetite. Denies urinary symptoms. No hx of abdominal surgery.

## 2025-05-24 NOTE — H&P
Subjective:      Rafal Soliz is a 74 y.o. male who is for evaluation of abdominal pain.  The pain is located in the RLQ without radiation.  Pain is described as sharp and stabbing, and measures 10/10 in intensity.  Onset of pain was 3 days ago. Aggravating factors include activity and pressure. Alleviating factors include none. Associated symptoms include anorexia and nausea    Past Medical History:   Diagnosis Date    Conductive hearing loss, middle ear     DM2 (diabetes mellitus, type 2) (HCC) 10/19/2016    resolved after weight loss    Elevated liver enzymes     HTN (hypertension)     resolved after weight loss     Hyperlipidemia     Subclinical hyperthyroidism     Tinnitus      Past Surgical History:   Procedure Laterality Date    COLONOSCOPY      COLONOSCOPY N/A 2018    COLONOSCOPY performed by Malro Will MD at [facility] ENDOSCOPY    MOHS SURGERY Left     also right shoulder surgery Ca deposits spurs and rotator cuff lt     Family History   Problem Relation Age of Onset    Cancer Father         smoker, throat cancer    Cancer Brother         pancreatic    Osteoarthritis Brother     No Known Problems Maternal Grandmother     Pneumonia Maternal Grandfather     No Known Problems Paternal Grandmother     Alzheimer's Disease Mother     No Known Problems Paternal Grandfather     No Known Problems Brother      Social History     Socioeconomic History    Marital status:      Spouse name: None    Number of children: None    Years of education: None    Highest education level: None   Tobacco Use    Smoking status: Former     Current packs/day: 0.00     Average packs/day: 2.0 packs/day for 25.9 years (51.8 ttl pk-yrs)     Types: Cigarettes     Start date: 1963     Quit date: 1988     Years since quittin.5    Smokeless tobacco: Never   Vaping Use    Vaping status: Never Used   Substance and Sexual Activity    Alcohol use: No    Drug use: Never    Sexual activity: Not Currently      medications. The patient understands the risks, any and all questions were answered to the patient's satisfaction.  3. Patient does wish to proceed with surgery.  Written consent was obtained.

## 2025-05-24 NOTE — PROGRESS NOTES
arranged ALS transportation from [facility] ED 10 to  [facility] ED   Arranged ALS transport w/[facility] -  advised to bring appropiate equipment - ETA of 15   minutes given - updated ED staff w/ETA

## 2025-05-24 NOTE — OP NOTE
Patient ID:   Name: Rafal Soliz   Medical Record Number: [id number]   YOB: 1950    Date of Surgery: 5/24/2025     Preoperative Diagnosis:   perforated appendix with abscess  Postoperative Diagnosis:  S/A    Procedures:  Laparoscopic appendectomy    Surgeon: Richie Parker MD     Assistant: Circulator: Peace Armendariz RN  First Assistant: Liddle, Gerald, RN  Scrub Person First: Cas Gray     Anesthesia: General    Findings: Ruptured appendix with abscess    Estimated Blood Loss:      Specimens:   ID Type Source Tests Collected by Time Destination   1 : APPENDIX Tissue Tissue SURGICAL PATHOLOGY Richie Parker MD 5/24/2025 7944    A : APPENDICEAL ABSCESS Body Fluid Fluid CULTURE, ANAEROBIC, CULTURE, BODY FLUID (WITH GRAM STAIN) Richie Parker MD 5/24/2025 1900         Implants: * No implants in log *    Indications: This is a 74 y.o. male who presents with abdominal pain for 3-5 days. He was referred from emergency room. He was positive for elevated white blood cell count, positive physical exam, positive CT scan, and typical history.      Procedure Details:  The patient was seen in the Holding Room. The risks, benefits, complications, treatment options, and expected outcomes were discussed with the patient.  After informed consent was performed, patient was taken to the operating room and was placed supine in the operating table.  Their left arm was tucked and all pressure points padded.  After establishing general anesthesia, abdomen was prepped and draped in standard surgical fashion.   A 12 mm incision was made in the left lower quadrant.  A 12 mm Excel trochar containing a 10mm laparoscope was then used to dissect through the abdominal wall under direct laparoscopic vision.  Entry into the abdomen was confirmed visually.  Once within the abdomen,CO2 pneumoperitoneum was then created.  Additional two 5mm blunt trocars were placed; one in the umbilicus and one in the lower midline.

## 2025-05-24 NOTE — ED PROVIDER NOTES
EMERGENCY DEPARTMENT HISTORY AND PHYSICAL EXAM      Date: 5/24/2025  Patient Name: Rafal Soliz    History of Presenting Illness     Chief Complaint   Patient presents with    Abdominal Cramping       History Provided By: Patient     HPI: Rafal Soliz, 74 y.o. male with past medical history listed below, presents via private vehicle to the ED with cc of abdominal pain.  Patient reports lower abdominal pain that started 3 days ago.  Reports pain has been constant.  Slightly worse on the right than the left.  Denies any nausea vomiting.  No diarrhea.  No black or tarry stools.  No rectal bleeding.  He does report a loss of appetite and minimal solid food intake.  No prior abdominal surgeries.  Denies any fevers or chills.  No chest pain or shortness of breath.        There are no other complaints, changes, or physical findings at this time.    PCP: Richie Aguilar MD    No current facility-administered medications on file prior to encounter.     Current Outpatient Medications on File Prior to Encounter   Medication Sig Dispense Refill    Multiple Vitamins-Minerals (PRESERVISION AREDS 2 PO) Take by mouth daily      atorvastatin (LIPITOR) 40 MG tablet Take 1 tablet by mouth daily 90 tablet 3    chlorthalidone (HYGROTON) 25 MG tablet Take 1 tablet by mouth daily 90 tablet 3    diclofenac (VOLTAREN) 75 MG EC tablet Take 1 tablet by mouth 2 times daily 60 tablet 3    methIMAzole (TAPAZOLE) 5 MG tablet Take 0.5 tablets by mouth every other day 23 tablet 3    metoprolol succinate (TOPROL XL) 25 MG extended release tablet Take 1 tablet by mouth daily 90 tablet 3    olmesartan (BENICAR) 20 MG tablet Take 1 tablet by mouth daily 90 tablet 3    potassium chloride (KLOR-CON M) 10 MEQ extended release tablet Take 1 tablet by mouth daily 90 tablet 3    Omega-3 Fatty Acids (FISH OIL PO) Take by mouth in the morning and at bedtime      ascorbic acid (VITAMIN C) 500 MG tablet Take 2 tablets by mouth daily       lower abdominal pain right slightly greater than left.  Will obtain CT abdomen pelvis to rule out any appendicitis, colitis.  Will obtain lactate to evaluate for ischemic colitis.  Obtain basic blood work to evaluate for any electrolyte abnormalities.  Obtain urinalysis to rule out UTI.    ED Course:   Initial assessment performed. The patients presenting problems have been discussed, and they are in agreement with the care plan formulated and outlined with them.  I have encouraged them to ask questions as they arise throughout their visit.    ED Course as of 05/24/25 1319   Sat May 24, 2025   1112 Normal lactate.  WBC elevated at 14.8.  Normal lipase and LFTs and bili.  UA with only 5-10 WBCs and 1+ bacteria.  Trace ketones.  Hydrating with IV fluids.  Obtaining CT abdomen pelvis. [CH]   1203 CT abdomen pelvis with perforated appendicitis.  Contacted [facility] for possible transfer to [facility] for general surgery evaluation. [CH]   [address] [facility] I spoke with Dr. Richie Parker, on-call for general surgery at [facility].  Reviewed patient's history presentation labs and imaging findings.  He recommended transfer to the [facility] emergency department and they would most likely take the patient directly to the OR. [CH]      ED Course User Index  [CH] Saleem Siegel MD         PROGRESS NOTE    ED EKG interpretation:10:31  Rhythm: normal sinus rhythm; and regular . Rate (approx.): 93; Axis: normal; AZ Interval: normal; QRS interval: Right bundle branch block; ST/T wave: non-specific changes; This EKG was interpreted by Saleem Siegel MD,ED Provider.       Pt reevaluated.  Patient penicillin allergic.  Started IV Levaquin and Flagyl.    Will transfer to [facility] ED for further evaluation and general surgery evaluation.   Written by Saleem Siegel MD                   Written by Saleem Siegel MD        Critical Care

## 2025-05-25 LAB
ANION GAP SERPL CALC-SCNC: 7 MMOL/L (ref 2–12)
BASOPHILS # BLD: 0.03 K/UL (ref 0–0.1)
BASOPHILS NFR BLD: 0.2 % (ref 0–1)
BUN SERPL-MCNC: 21 MG/DL (ref 6–20)
BUN/CREAT SERPL: 18 (ref 12–20)
CALCIUM SERPL-MCNC: 8.4 MG/DL (ref 8.5–10.1)
CHLORIDE SERPL-SCNC: 95 MMOL/L (ref 97–108)
CO2 SERPL-SCNC: 28 MMOL/L (ref 21–32)
CREAT SERPL-MCNC: 1.17 MG/DL (ref 0.7–1.3)
DIFFERENTIAL METHOD BLD: ABNORMAL
EOSINOPHIL # BLD: 0 K/UL (ref 0–0.4)
EOSINOPHIL NFR BLD: 0 % (ref 0–7)
ERYTHROCYTE [DISTWIDTH] IN BLOOD BY AUTOMATED COUNT: 12.8 % (ref 11.5–14.5)
GLUCOSE BLD STRIP.AUTO-MCNC: 156 MG/DL (ref 65–117)
GLUCOSE BLD STRIP.AUTO-MCNC: 237 MG/DL (ref 65–117)
GLUCOSE SERPL-MCNC: 216 MG/DL (ref 65–100)
HCT VFR BLD AUTO: 39 % (ref 36.6–50.3)
HGB BLD-MCNC: 12.8 G/DL (ref 12.1–17)
IMM GRANULOCYTES # BLD AUTO: 0.15 K/UL (ref 0–0.04)
IMM GRANULOCYTES NFR BLD AUTO: 1 % (ref 0–0.5)
LYMPHOCYTES # BLD: 0.65 K/UL (ref 0.8–3.5)
LYMPHOCYTES NFR BLD: 4.2 % (ref 12–49)
MCH RBC QN AUTO: 28.8 PG (ref 26–34)
MCHC RBC AUTO-ENTMCNC: 32.8 G/DL (ref 30–36.5)
MCV RBC AUTO: 87.6 FL (ref 80–99)
MONOCYTES # BLD: 0.69 K/UL (ref 0–1)
MONOCYTES NFR BLD: 4.5 % (ref 5–13)
NEUTS SEG # BLD: 13.88 K/UL (ref 1.8–8)
NEUTS SEG NFR BLD: 90.1 % (ref 32–75)
NRBC # BLD: 0 K/UL (ref 0–0.01)
NRBC BLD-RTO: 0 PER 100 WBC
PHOSPHATE SERPL-MCNC: 3.4 MG/DL (ref 2.6–4.7)
PLATELET # BLD AUTO: 188 K/UL (ref 150–400)
PMV BLD AUTO: 9.4 FL (ref 8.9–12.9)
POTASSIUM SERPL-SCNC: 4 MMOL/L (ref 3.5–5.1)
RBC # BLD AUTO: 4.45 M/UL (ref 4.1–5.7)
RBC MORPH BLD: ABNORMAL
SERVICE CMNT-IMP: ABNORMAL
SERVICE CMNT-IMP: ABNORMAL
SODIUM SERPL-SCNC: 130 MMOL/L (ref 136–145)
WBC # BLD AUTO: 15.4 K/UL (ref 4.1–11.1)

## 2025-05-25 PROCEDURE — 85025 COMPLETE CBC W/AUTO DIFF WBC: CPT

## 2025-05-25 PROCEDURE — 84100 ASSAY OF PHOSPHORUS: CPT

## 2025-05-25 PROCEDURE — 2700000000 HC OXYGEN THERAPY PER DAY

## 2025-05-25 PROCEDURE — 36415 COLL VENOUS BLD VENIPUNCTURE: CPT

## 2025-05-25 PROCEDURE — 82962 GLUCOSE BLOOD TEST: CPT

## 2025-05-25 PROCEDURE — 1100000000 HC RM PRIVATE

## 2025-05-25 PROCEDURE — 6360000002 HC RX W HCPCS: Performed by: SURGERY

## 2025-05-25 PROCEDURE — 2500000003 HC RX 250 WO HCPCS: Performed by: SURGERY

## 2025-05-25 PROCEDURE — 6370000000 HC RX 637 (ALT 250 FOR IP): Performed by: SURGERY

## 2025-05-25 PROCEDURE — 2580000003 HC RX 258: Performed by: SURGERY

## 2025-05-25 PROCEDURE — 94760 N-INVAS EAR/PLS OXIMETRY 1: CPT

## 2025-05-25 PROCEDURE — 80048 BASIC METABOLIC PNL TOTAL CA: CPT

## 2025-05-25 RX ORDER — SALIVA STIMULANT COMB. NO.3
SPRAY, NON-AEROSOL (ML) MUCOUS MEMBRANE PRN
Status: DISCONTINUED | OUTPATIENT
Start: 2025-05-25 | End: 2025-05-27 | Stop reason: HOSPADM

## 2025-05-25 RX ADMIN — HYDROMORPHONE HYDROCHLORIDE 0.5 MG: 1 INJECTION, SOLUTION INTRAMUSCULAR; INTRAVENOUS; SUBCUTANEOUS at 09:54

## 2025-05-25 RX ADMIN — DEXTROSE MONOHYDRATE, SODIUM CHLORIDE, AND POTASSIUM CHLORIDE: 50; 4.5; 1.49 INJECTION, SOLUTION INTRAVENOUS at 06:29

## 2025-05-25 RX ADMIN — SODIUM CHLORIDE, PRESERVATIVE FREE 10 ML: 5 INJECTION INTRAVENOUS at 10:00

## 2025-05-25 RX ADMIN — METHIMAZOLE 2.5 MG: 5 TABLET ORAL at 09:59

## 2025-05-25 RX ADMIN — ENOXAPARIN SODIUM 40 MG: 100 INJECTION SUBCUTANEOUS at 09:59

## 2025-05-25 RX ADMIN — POTASSIUM CHLORIDE 10 MEQ: 1500 TABLET, EXTENDED RELEASE ORAL at 09:59

## 2025-05-25 RX ADMIN — PIPERACILLIN AND TAZOBACTAM 3375 MG: 3; .375 INJECTION, POWDER, LYOPHILIZED, FOR SOLUTION INTRAVENOUS at 20:54

## 2025-05-25 RX ADMIN — ATORVASTATIN CALCIUM 40 MG: 40 TABLET, FILM COATED ORAL at 09:59

## 2025-05-25 RX ADMIN — SODIUM CHLORIDE, PRESERVATIVE FREE 10 ML: 5 INJECTION INTRAVENOUS at 20:47

## 2025-05-25 RX ADMIN — METOPROLOL SUCCINATE 25 MG: 25 TABLET, FILM COATED, EXTENDED RELEASE ORAL at 10:00

## 2025-05-25 RX ADMIN — SODIUM CHLORIDE: 0.9 INJECTION, SOLUTION INTRAVENOUS at 20:53

## 2025-05-25 RX ADMIN — LOSARTAN POTASSIUM 50 MG: 50 TABLET, FILM COATED ORAL at 10:00

## 2025-05-25 RX ADMIN — Medication: at 09:56

## 2025-05-25 RX ADMIN — PIPERACILLIN AND TAZOBACTAM 3375 MG: 3; .375 INJECTION, POWDER, LYOPHILIZED, FOR SOLUTION INTRAVENOUS at 03:41

## 2025-05-25 RX ADMIN — ACETAMINOPHEN 650 MG: 325 TABLET ORAL at 00:29

## 2025-05-25 RX ADMIN — CHLORTHALIDONE 25 MG: 25 TABLET ORAL at 09:59

## 2025-05-25 RX ADMIN — PIPERACILLIN AND TAZOBACTAM 3375 MG: 3; .375 INJECTION, POWDER, LYOPHILIZED, FOR SOLUTION INTRAVENOUS at 12:48

## 2025-05-25 ASSESSMENT — PAIN DESCRIPTION - LOCATION
LOCATION: ABDOMEN

## 2025-05-25 ASSESSMENT — PAIN - FUNCTIONAL ASSESSMENT
PAIN_FUNCTIONAL_ASSESSMENT: ACTIVITIES ARE NOT PREVENTED

## 2025-05-25 ASSESSMENT — PAIN DESCRIPTION - DESCRIPTORS
DESCRIPTORS: DISCOMFORT

## 2025-05-25 ASSESSMENT — PAIN SCALES - GENERAL
PAINLEVEL_OUTOF10: 5
PAINLEVEL_OUTOF10: 4
PAINLEVEL_OUTOF10: 7
PAINLEVEL_OUTOF10: 2
PAINLEVEL_OUTOF10: 2

## 2025-05-25 ASSESSMENT — PAIN DESCRIPTION - PAIN TYPE: TYPE: SURGICAL PAIN

## 2025-05-25 ASSESSMENT — PAIN DESCRIPTION - ONSET: ONSET: PROGRESSIVE

## 2025-05-25 ASSESSMENT — PAIN DESCRIPTION - ORIENTATION
ORIENTATION: ANTERIOR
ORIENTATION: ANTERIOR
ORIENTATION: MID

## 2025-05-25 ASSESSMENT — PAIN DESCRIPTION - FREQUENCY: FREQUENCY: CONTINUOUS

## 2025-05-25 NOTE — PROGRESS NOTES
End of Shift Note    Bedside shift change report given to Melanie (oncoming nurse) by Kelly Hannon RN (offgoing nurse).  Report included the following information SBAR    Shift worked:  Day     Shift summary and any significant changes:     Lowry removed per Dr Parker note. Patient up to recliner. Pain well controlled during shift.      Concerns for physician to address:       Zone phone for oncoming shift:          Activity:     Number times ambulated in hallways past shift: 0  Number of times OOB to chair past shift: 2    Cardiac:   Cardiac Monitoring: No      Cardiac Rhythm: Sinus rhythm    Access:  Current line(s): PIV     Genitourinary:   Urinary Status: Lowry    Respiratory:   O2 Device: None (Room air)  Chronic home O2 use?: NO  Incentive spirometer at bedside: YES    GI:  Last BM (including prior to admit): 05/24/25  Current diet:  ADULT DIET; Clear Liquid  Passing flatus: YES    Pain Management:   Patient states pain is manageable on current regimen: YES    Skin:  Fortino Scale Score: 21  Interventions: Wound Offloading (Prevention Methods): Bed, pressure reduction mattress, Pillows    Patient Safety:  Fall Risk: Nursing Judgement-Fall Risk High(Add Comments): No  Fall Risk Interventions  Nursing Judgement-Fall Risk High(Add Comments): No  Toilet Every 2 Hours-In Advance of Need: Yes  Hourly Visual Checks: Awake, In bed, Quiet  Fall Visual Posted: Fall sign posted, Socks  Room Door Open: Yes  Alarm On: Bed  Patient Moved Closer to Nursing Station: No    Active Consults:   IP CONSULT TO GENERAL SURGERY  IP CONSULT TO SPIRITUAL SERVICES    Length of Stay:  Expected LOS: 3  Actual LOS: 1    Kelly Hannon RN

## 2025-05-25 NOTE — PROGRESS NOTES
SURGERY PROGRESS NOTE      Admit Date: 2025    POD 1 Day Post-Op    Procedure: Procedure(s):  LAPAROSCOPIC APPENDECTOMY      Subjective:     Patient states pain is controlled.  He denies nausea.   No flatus yet.      Objective:     /67   Pulse 61   Temp 97.9 °F (36.6 °C) (Oral)   Resp 16   Ht 1.778 m (5' 10\")   Wt 83.9 kg (184 lb 15.5 oz)   SpO2 97%   BMI 26.54 kg/m²      Temp (24hrs), Av.6 °F (37 °C), Min:97.7 °F (36.5 °C), Max:100 °F (37.8 °C)      701 - 1900  In: -   Out: 1880 [Urine:1850; Drains:30]  1901 -  0700  In: 2391.1 [P.O.:240; I.V.:2101.1]  Out: 825 [Urine:675; Drains:150]    Physical Exam:    General:  alert, appears stated age, cooperative, and no distress   Abdomen: soft, bowel sounds hypoactive, non-tender, distneded    JENNY 150 cc serosanguinous    Incision:   healing well, no drainage, no erythema, no hernia, no seroma, no swelling, no dehiscence, incision well approximated         CBC:   Lab Results   Component Value Date/Time    WBC 15.4 2025 04:06 AM    RBC 4.45 2025 04:06 AM    HGB 12.8 2025 04:06 AM    HCT 39.0 2025 04:06 AM    MCV 87.6 2025 04:06 AM    MCH 28.8 2025 04:06 AM    MCHC 32.8 2025 04:06 AM    RDW 12.8 2025 04:06 AM     2025 04:06 AM    MPV 9.4 2025 04:06 AM     BMP:    Lab Results   Component Value Date/Time     2025 04:06 AM    K 4.0 2025 04:06 AM    CL 95 2025 04:06 AM    CO2 28 2025 04:06 AM    BUN 21 2025 04:06 AM    CREATININE 1.17 2025 04:06 AM    CALCIUM 8.4 2025 04:06 AM    GFRAA >60 2022 08:13 AM    LABGLOM 65 2025 04:06 AM    LABGLOM >60 2023 11:35 AM    LABGLOM >60 2023 09:34 AM    GLUCOSE 216 2025 04:06 AM       Assessment:     Principal Problem:    Acute perforated appendicitis  Active Problems:    Acute appendicitis with localized peritonitis, without perforation, abscess, or  gangrene  Resolved Problems:    * No resolved hospital problems. *    Recovering as expected     Plan:        Stay on CLD   D/C [place]  OOB  4.  Continue abx

## 2025-05-25 NOTE — PLAN OF CARE
Problem: Chronic Conditions and Co-morbidities  Goal: Patient's chronic conditions and co-morbidity symptoms are monitored and maintained or improved  Outcome: Progressing  Flowsheets (Taken 5/24/2025 2045)  Care Plan - Patient's Chronic Conditions and Co-Morbidity Symptoms are Monitored and Maintained or Improved:   Monitor and assess patient's chronic conditions and comorbid symptoms for stability, deterioration, or improvement   Collaborate with multidisciplinary team to address chronic and comorbid conditions and prevent exacerbation or deterioration   Update acute care plan with appropriate goals if chronic or comorbid symptoms are exacerbated and prevent overall improvement and discharge     Problem: Pain  Goal: Verbalizes/displays adequate comfort level or baseline comfort level  Outcome: Progressing  Flowsheets (Taken 5/24/2025 2045)  Verbalizes/displays adequate comfort level or baseline comfort level:   Encourage patient to monitor pain and request assistance   Assess pain using appropriate pain scale   Administer analgesics based on type and severity of pain and evaluate response   Implement non-pharmacological measures as appropriate and evaluate response   Consider cultural and social influences on pain and pain management   Notify Licensed Independent Practitioner if interventions unsuccessful or patient reports new pain     Problem: Safety - Adult  Goal: Free from fall injury  Outcome: Progressing  Flowsheets (Taken 5/24/2025 2045)  Free From Fall Injury:   Instruct family/caregiver on patient safety   Based on caregiver fall risk screen, instruct family/caregiver to ask for assistance with transferring infant if caregiver noted to have fall risk factors     Problem: Discharge Planning  Goal: Discharge to home or other facility with appropriate resources  Outcome: Progressing  Flowsheets (Taken 5/24/2025 2045)  Discharge to home or other facility with appropriate resources:   Identify barriers to

## 2025-05-25 NOTE — ED PROVIDER NOTES
PROGRESS NOTE:     Please see full H&P from Dr. Siegel as he is the provider of record.    Patient had been seen at [facility] earlier this morning secondary to right lower quadrant pain for the past 3 days in addition to nausea vomiting and decreased p.o. intake.  On CT there patient was noted to have a perforated appendix.  Patient was started on antibiotics.    Consultation of been made with Dr. Parker, general surgery here at [facility] who requested patient be transferred to the ED for operative management.      Patient arrives hemodynamically stable not currently complaining of any pain.    IV fluids ordered.    Consult note:  Case discussed with Dr. Parker who will come evaluate patient in the ED.    Patient admitted.     Davide Alcaraz,   05/24/25 2050

## 2025-05-25 NOTE — PLAN OF CARE
Problem: Chronic Conditions and Co-morbidities  Goal: Patient's chronic conditions and co-morbidity symptoms are monitored and maintained or improved  5/25/2025 0930 by Kelly Hannon RN  Outcome: Progressing  5/24/2025 2117 by Ira Gilliam RN  Outcome: Progressing  Flowsheets (Taken 5/24/2025 2045)  Care Plan - Patient's Chronic Conditions and Co-Morbidity Symptoms are Monitored and Maintained or Improved:   Monitor and assess patient's chronic conditions and comorbid symptoms for stability, deterioration, or improvement   Collaborate with multidisciplinary team to address chronic and comorbid conditions and prevent exacerbation or deterioration   Update acute care plan with appropriate goals if chronic or comorbid symptoms are exacerbated and prevent overall improvement and discharge     Problem: Pain  Goal: Verbalizes/displays adequate comfort level or baseline comfort level  5/25/2025 0930 by Kelly Hannon RN  Outcome: Progressing  5/24/2025 2117 by Ira Gilliam RN  Outcome: Progressing  Flowsheets (Taken 5/24/2025 2045)  Verbalizes/displays adequate comfort level or baseline comfort level:   Encourage patient to monitor pain and request assistance   Assess pain using appropriate pain scale   Administer analgesics based on type and severity of pain and evaluate response   Implement non-pharmacological measures as appropriate and evaluate response   Consider cultural and social influences on pain and pain management   Notify Licensed Independent Practitioner if interventions unsuccessful or patient reports new pain     Problem: Safety - Adult  Goal: Free from fall injury  5/25/2025 0930 by Klely Hannon RN  Outcome: Progressing  5/24/2025 2117 by Ira Gilliam RN  Outcome: Progressing  Flowsheets (Taken 5/24/2025 2045)  Free From Fall Injury:   Instruct family/caregiver on patient safety   Based on caregiver fall risk screen, instruct family/caregiver to ask for

## 2025-05-25 NOTE — PROGRESS NOTES
End of Shift Note    Bedside shift change report given to ELIDA Mendoza (oncoming nurse) by Ira Gilliam RN (offgoing nurse).  Report included the following information SBAR, Procedure Summary, Intake/Output, MAR, and Recent Results    Shift worked:  3682-4466     Shift summary and any significant changes:     Pt received from PACU at 2045, admission database completed with pt at bedside. Pt is [place], glasses have built-in hearing aids. Dressing CDI, no drainage noted overnight. JENNY drained 120mL serosanguinous fluid, site with some drainage overnight but not actively leaking. Pain managed per MAR, ice pack to site helped out in between meds. Tolerated CL diet, takes pills one at a time. ICS up to 1500, encouraged moreso during waking hours. Pt weaned down to RA, no SOB. Israel placed during case for retention, emptied by PCT, israel care provided.      Concerns for physician to address: -- voiding trial?  -- pt is diabetic, not on any meds at home. BG on AM labs >200.      Zone phone for oncoming shift:          Activity:     Number times ambulated in hallways past shift: 0  Number of times OOB to chair past shift: 0    Cardiac:   Cardiac Monitoring: No        Access:  Current line(s): PIV    Genitourinary:   Urinary Status: [place]    Respiratory:   O2 Device: None (Room air)  Chronic home O2 use?: NO  Incentive spirometer at bedside: YES    GI:  Last BM (including prior to admit): 05/24/25  Current diet:  ADULT DIET; Clear Liquid  Passing flatus: NO    Pain Management:   Patient states pain is manageable on current regimen: YES    Skin:  Fortino Scale Score: 19  Interventions: Wound Offloading (Prevention Methods): Bed, pressure reduction mattress    Patient Safety:  Fall Risk: Nursing Judgement-Fall Risk High(Add Comments): No  Fall Risk Interventions  Nursing Judgement-Fall Risk High(Add Comments): No  Toilet Every 2 Hours-In Advance of Need: Yes  Hourly Visual Checks: In bed, Awake  Fall Visual Posted: Fall sign  posted, Socks  Room Door Open: Deferred to promote rest  Alarm On: Bed  Patient Moved Closer to Nursing Station: No    Active Consults:   IP CONSULT TO GENERAL SURGERY  IP CONSULT TO SPIRITUAL SERVICES    Length of Stay:  Expected LOS: 3  Actual LOS: 1    Ira Gilliam RN

## 2025-05-26 LAB
ANION GAP SERPL CALC-SCNC: 6 MMOL/L (ref 2–12)
BACTERIA SPEC CULT: ABNORMAL
BASOPHILS # BLD: 0.01 K/UL (ref 0–0.1)
BASOPHILS NFR BLD: 0.1 % (ref 0–1)
BUN SERPL-MCNC: 22 MG/DL (ref 6–20)
BUN/CREAT SERPL: 19 (ref 12–20)
CALCIUM SERPL-MCNC: 8.6 MG/DL (ref 8.5–10.1)
CHLORIDE SERPL-SCNC: 100 MMOL/L (ref 97–108)
CO2 SERPL-SCNC: 28 MMOL/L (ref 21–32)
CREAT SERPL-MCNC: 1.15 MG/DL (ref 0.7–1.3)
DIFFERENTIAL METHOD BLD: ABNORMAL
EOSINOPHIL # BLD: 0 K/UL (ref 0–0.4)
EOSINOPHIL NFR BLD: 0 % (ref 0–7)
ERYTHROCYTE [DISTWIDTH] IN BLOOD BY AUTOMATED COUNT: 13 % (ref 11.5–14.5)
GLUCOSE SERPL-MCNC: 139 MG/DL (ref 65–100)
HCT VFR BLD AUTO: 40.3 % (ref 36.6–50.3)
HGB BLD-MCNC: 13.2 G/DL (ref 12.1–17)
IMM GRANULOCYTES # BLD AUTO: 0.06 K/UL (ref 0–0.04)
IMM GRANULOCYTES NFR BLD AUTO: 0.5 % (ref 0–0.5)
LYMPHOCYTES # BLD: 1.15 K/UL (ref 0.8–3.5)
LYMPHOCYTES NFR BLD: 9 % (ref 12–49)
MCH RBC QN AUTO: 28.4 PG (ref 26–34)
MCHC RBC AUTO-ENTMCNC: 32.8 G/DL (ref 30–36.5)
MCV RBC AUTO: 86.9 FL (ref 80–99)
MONOCYTES # BLD: 0.88 K/UL (ref 0–1)
MONOCYTES NFR BLD: 6.9 % (ref 5–13)
NEUTS SEG # BLD: 10.62 K/UL (ref 1.8–8)
NEUTS SEG NFR BLD: 83.5 % (ref 32–75)
NRBC # BLD: 0 K/UL (ref 0–0.01)
NRBC BLD-RTO: 0 PER 100 WBC
PLATELET # BLD AUTO: 225 K/UL (ref 150–400)
PMV BLD AUTO: 9.3 FL (ref 8.9–12.9)
POTASSIUM SERPL-SCNC: 3.3 MMOL/L (ref 3.5–5.1)
RBC # BLD AUTO: 4.64 M/UL (ref 4.1–5.7)
SERVICE CMNT-IMP: ABNORMAL
SODIUM SERPL-SCNC: 134 MMOL/L (ref 136–145)
WBC # BLD AUTO: 12.7 K/UL (ref 4.1–11.1)

## 2025-05-26 PROCEDURE — 6370000000 HC RX 637 (ALT 250 FOR IP): Performed by: SURGERY

## 2025-05-26 PROCEDURE — 1100000000 HC RM PRIVATE

## 2025-05-26 PROCEDURE — 2580000003 HC RX 258: Performed by: SURGERY

## 2025-05-26 PROCEDURE — 94760 N-INVAS EAR/PLS OXIMETRY 1: CPT

## 2025-05-26 PROCEDURE — 85025 COMPLETE CBC W/AUTO DIFF WBC: CPT

## 2025-05-26 PROCEDURE — 80048 BASIC METABOLIC PNL TOTAL CA: CPT

## 2025-05-26 PROCEDURE — 36415 COLL VENOUS BLD VENIPUNCTURE: CPT

## 2025-05-26 PROCEDURE — 6360000002 HC RX W HCPCS: Performed by: SURGERY

## 2025-05-26 PROCEDURE — 2500000003 HC RX 250 WO HCPCS: Performed by: SURGERY

## 2025-05-26 RX ADMIN — SODIUM CHLORIDE, PRESERVATIVE FREE 10 ML: 5 INJECTION INTRAVENOUS at 08:17

## 2025-05-26 RX ADMIN — DEXTROSE MONOHYDRATE, SODIUM CHLORIDE, AND POTASSIUM CHLORIDE: 50; 4.5; 1.49 INJECTION, SOLUTION INTRAVENOUS at 21:37

## 2025-05-26 RX ADMIN — PIPERACILLIN AND TAZOBACTAM 3375 MG: 3; .375 INJECTION, POWDER, LYOPHILIZED, FOR SOLUTION INTRAVENOUS at 12:08

## 2025-05-26 RX ADMIN — DEXTROSE MONOHYDRATE, SODIUM CHLORIDE, AND POTASSIUM CHLORIDE: 50; 4.5; 1.49 INJECTION, SOLUTION INTRAVENOUS at 08:28

## 2025-05-26 RX ADMIN — ATORVASTATIN CALCIUM 40 MG: 40 TABLET, FILM COATED ORAL at 08:15

## 2025-05-26 RX ADMIN — PIPERACILLIN AND TAZOBACTAM 3375 MG: 3; .375 INJECTION, POWDER, LYOPHILIZED, FOR SOLUTION INTRAVENOUS at 21:40

## 2025-05-26 RX ADMIN — CHLORTHALIDONE 25 MG: 25 TABLET ORAL at 08:15

## 2025-05-26 RX ADMIN — POTASSIUM CHLORIDE 10 MEQ: 1500 TABLET, EXTENDED RELEASE ORAL at 08:14

## 2025-05-26 RX ADMIN — ENOXAPARIN SODIUM 40 MG: 100 INJECTION SUBCUTANEOUS at 08:15

## 2025-05-26 RX ADMIN — SODIUM CHLORIDE: 0.9 INJECTION, SOLUTION INTRAVENOUS at 21:39

## 2025-05-26 RX ADMIN — PIPERACILLIN AND TAZOBACTAM 3375 MG: 3; .375 INJECTION, POWDER, LYOPHILIZED, FOR SOLUTION INTRAVENOUS at 04:20

## 2025-05-26 NOTE — PROGRESS NOTES
Comprehensive Nutrition Assessment    Type and Reason for Visit:  Initial, Positive nutrition screen    Nutrition Recommendations/Plan:   Advance diet as tolerated to 5 carb choice diet      Malnutrition Assessment:  Malnutrition Status:  No malnutrition (05/26/25 1125)    Context:  Acute Illness     Findings of the 6 clinical characteristics of malnutrition:  Energy Intake:  Mild decrease in energy intake  Weight Loss:  No weight loss     Body Fat Loss:  No body fat loss     Muscle Mass Loss:  No muscle mass loss    Fluid Accumulation:  No fluid accumulation     Strength:  Not Performed    Nutrition Assessment:    Patient medically noted for perforated appendix s/p appendectomy. PMH HTN, HLD, and DM. Diet advanced to full liquids this morning. Patient reports good tolerance of PO so far. Awaiting return of bowel function in order to discharge home. He reports a good appetite at baseline. No significant weight changes recently. Eats a lot of fruits and vegetables and states his wife is a great cook. No nutrition questions or concerns reported. Advance diet as tolerated.     Nutrition Related Findings:    Na 134, K+ 3.4, -[phone]   BM 5/24   Atorvastatin, KCl, D5% IVF   Wound Type: Surgical Incision       Current Nutrition Intake & Therapies:          ADULT DIET; Full Liquid    Anthropometric Measures:  Height: 177.8 cm (5' 10\")  Ideal Body Weight (IBW): 166 lbs (75 kg)       Current Body Weight: 83.9 kg (184 lb 15.5 oz),   IBW.    Current BMI (kg/m2): 26.5   -185#                          BMI Categories: Overweight (BMI 25.0-29.9)    Estimated Daily Nutrient Needs:  Energy Requirements Based On: Formula  Weight Used for Energy Requirements: Current  Energy (kcal/day): 2049 kcals (BMR x 1.3AF)  Weight Used for Protein Requirements: Current  Protein (g/day): 84-101g (1.0-1.2 g/kg bw)  Method Used for Fluid Requirements: 1 ml/kcal  Fluid (ml/day): 2050 mL    Nutrition Diagnosis:   No nutrition

## 2025-05-26 NOTE — PROGRESS NOTES
Spiritual Health History and Assessment/Progress Note  [facility]    Advance Care Planning,  ,  ,      Name: Rafal Soliz MRN: [id number]    Age: 74 y.o.     Sex: male   Language: English   Anabaptism: Lutheran   Acute perforated appendicitis     Date: 5/26/2025            Total Time Calculated: 30 min              Spiritual Assessment began in [facility] 3 SURG TELE        Referral/Consult From: Multi-disciplinary team   Encounter Overview/Reason: Advance Care Planning  Service Provided For: Patient    Le[place], Belief, Meaning:   Patient is connected with a leilani tradition or spiritual practice and has beliefs or practices that help with coping during difficult times  Family/Friends No family/friends present      Importance and Influence:  Patient has spiritual/personal beliefs that influence decisions regarding their health  Family/Friends No family/friends present    Community:  Patient is connected with a spiritual community and feels well-supported. Support system includes: Spouse/Partner, Children, [place] Community, and Extended family  Family/Friends No family/friends present    Assessment and Plan of Care:     Patient Interventions include: Affirmed coping skills/support systems  Family/Friends Interventions include: No family/friends present    Patient Plan of Care: No spiritual needs identified for follow-up and Spiritual Care available upon further referral  Family/Friends Plan of Care: No family/friends present    Electronically signed by DAVID Jamil on 5/26/2025 at 10:33 AM

## 2025-05-26 NOTE — PROGRESS NOTES
End of Shift Note    Bedside shift change report given to Coretta (oncoming nurse) by Mel Handy RN (offgoing nurse).  Report included the following information SBAR, MAR, and Recent Results    Shift worked:  7a-7pm     Shift summary and any significant changes:     Patient is Aox4. Patient denies any pain at present. Patient tolerating diet well. Patient OOB to bathroom and ambulates in room independently. JENNY drain with _ serosanguinous drainage. BS are active x4 with flatus, no BM. No concerns reported.     Concerns for physician to address:       Zone phone for oncoming shift:          Activity:  Level of Assistance: Independent  Number times ambulated in hallways past shift: 0  Number of times OOB to chair past shift: 1    Cardiac:   Cardiac Monitoring: No      Cardiac Rhythm: Sinus rhythm    Access:  Current line(s): PIV    Genitourinary:   Urinary Status: Voiding    Respiratory:   O2 Device: None (Room air)  Chronic home O2 use?: NO  Incentive spirometer at bedside: YES    GI:  Last BM (including prior to admit): 05/24/25  Current diet:  ADULT DIET; Full Liquid  Passing flatus: YES    Pain Management:   Patient states pain is manageable on current regimen: YES    Skin:  Fortino Scale Score: 20  Interventions: Wound Offloading (Prevention Methods): Repositioning, Turning, Pillows    Patient Safety:  Fall Risk: Nursing Judgement-Fall Risk High(Add Comments): No  Fall Risk Interventions  Nursing Judgement-Fall Risk High(Add Comments): No  Toilet Every 2 Hours-In Advance of Need: Yes  Hourly Visual Checks: Awake, In bed  Fall Visual Posted: Fall sign posted, Socks  Room Door Open: Yes  Alarm On: Bed  Patient Moved Closer to Nursing Station: No    Active Consults:   IP CONSULT TO SPIRITUAL SERVICES    Length of Stay:  Expected LOS: 3  Actual LOS: 2    Mel Handy, RN

## 2025-05-26 NOTE — PROGRESS NOTES
Patient refused BS check stating \"I only check it like once when I'm at home\". Nurse educated patient on the reasoning why we check the BS the way that we do and patient still refused BS check.

## 2025-05-26 NOTE — PROGRESS NOTES
End of Shift Note    Bedside shift change report given to ELIDA Callahan (oncoming nurse) by Melanie Greenfield LPN (offgoing nurse).  Report included the following information SBAR, Kardex, Intake/Output, MAR, and Recent Results    Shift worked:  7p-730a     Shift summary and any significant changes:     Patient had an uneventful night. Tolerated all medications. No s/s of distress, discomfort or pain. Patient slept well throughout the night. Patient A&O x4 and able to express wants and needs clearly. VSS. Bed in lowest lock position. Call bell in reach.      Concerns for physician to address:       Zone phone for oncoming shift:       Activity:     Number times ambulated in hallways past shift: 0  Number of times OOB to chair past shift: 0    Cardiac:   Cardiac Monitoring: No           Access:   Current line(s): Peripheral IV    Genitourinary:   Urinary status: Patient is voiding without difficulty.    Respiratory:      Chronic home O2 use?: NO  Incentive spirometer at bedside: YES       GI:     Current diet:  ADULT DIET; Clear Liquid  Passing flatus: No  Tolerating current diet: Yes       Pain Management:   Patient states pain is manageable on current regimen: Yes    Skin:     Interventions: No new skin issues or concerns    Patient Safety:  Fall Score: PA Fall Risk Score: 67.61    Interventions:           Length of Stay:  Expected LOS: 3  Actual LOS: 2      Melanie Greenfield LPN

## 2025-05-26 NOTE — ACP (ADVANCE CARE PLANNING)
Advance Care Planning     Advance Care Planning Inpatient Note  [facility] Department    Today's Date: 5/26/2025  Unit: [facility] 3 SURG TELE    Received request from IDT Member.  Upon review of chart and communication with care team, patient's decision making abilities are not in question.. Patient was/were present in the room during visit.    Goals of ACP Conversation:  Discuss advance care planning documents    Health Care Decision Makers:       Primary Decision Maker: Krys Soliz - Spouse - [phone]     Advance Care Planning Documents (Patient Wishes):  Living Will/Advance Directive     Assessment:   responded to staff request or an AMD consult with patient in 3115. After reviewing document, patient requested to save a copy for further review with spouse. A copy was provided, advised of  availability fir further assistance.      Interventions:  Encouraged ongoing ACP conversation with future decision makers and loved ones    Care Preferences Communicated:   No    Outcomes/Plan:  ACP Discussion: Completed    Electronically signed by DAVID Jamil on 5/26/2025 at 10:28 AM

## 2025-05-26 NOTE — PROGRESS NOTES
Admit Date: 2025    POD 2 Days Post-Op    Procedure:  Procedure(s):  LAPAROSCOPIC APPENDECTOMY    Subjective:     Patient has no new complaints. + flatus, no BM yet    Objective:     Blood pressure (!) 116/51, pulse 64, temperature 98.1 °F (36.7 °C), temperature source Oral, resp. rate 18, height 1.778 m (5' 10\"), weight 83.9 kg (184 lb 15.5 oz), SpO2 93%.    Temp (24hrs), Av °F (36.7 °C), Min:97.7 °F (36.5 °C), Max:98.2 °F (36.8 °C)      Physical Exam:  GENERAL: alert, appears stated age, and cooperative, LUNG: clear to auscultation bilaterally, HEART: regular rate and rhythm, ABDOMEN: soft, ND, wounds c/d/I, JENNY o/p serous, EXTREMITIES:  extremities normal, atraumatic, no cyanosis or edema    Labs:   Recent Results (from the past 24 hours)   POCT Glucose    Collection Time: 25 11:53 AM   Result Value Ref Range    POC Glucose 237 (H) 65 - 117 mg/dL    Performed by: Gama Bright PCT    POCT Glucose    Collection Time: 25  4:52 PM   Result Value Ref Range    POC Glucose 156 (H) 65 - 117 mg/dL    Performed by: Gama Bright PCT    CBC with Auto Differential    Collection Time: 25  4:40 AM   Result Value Ref Range    WBC 12.7 (H) 4.1 - 11.1 K/uL    RBC 4.64 4.10 - 5.70 M/uL    Hemoglobin 13.2 12.1 - 17.0 g/dL    Hematocrit 40.3 36.6 - 50.3 %    MCV 86.9 80.0 - 99.0 FL    MCH 28.4 26.0 - 34.0 PG    MCHC 32.8 30.0 - 36.5 g/dL    RDW 13.0 11.5 - 14.5 %    Platelets 225 150 - 400 K/uL    MPV 9.3 8.9 - 12.9 FL    Nucleated RBCs 0.0 0  WBC    nRBC 0.00 0.00 - 0.01 K/uL    Neutrophils % 83.5 (H) 32.0 - 75.0 %    Lymphocytes % 9.0 (L) 12.0 - 49.0 %    Monocytes % 6.9 5.0 - 13.0 %    Eosinophils % 0.0 0.0 - 7.0 %    Basophils % 0.1 0.0 - 1.0 %    Immature Granulocytes % 0.5 0.0 - 0.5 %    Neutrophils Absolute 10.62 (H) 1.80 - 8.00 K/UL    Lymphocytes Absolute 1.15 0.80 - 3.50 K/UL    Monocytes Absolute 0.88 0.00 - 1.00 K/UL    Eosinophils Absolute 0.00 0.00 - 0.40 K/UL    Basophils Absolute  0.01 0.00 - 0.10 K/UL    Immature Granulocytes Absolute 0.06 (H) 0.00 - 0.04 K/UL    Differential Type AUTOMATED     Basic Metabolic Panel    Collection Time: 05/26/25  4:40 AM   Result Value Ref Range    Sodium 134 (L) 136 - 145 mmol/L    Potassium 3.3 (L) 3.5 - 5.1 mmol/L    Chloride 100 97 - 108 mmol/L    CO2 28 21 - 32 mmol/L    Anion Gap 6 2 - 12 mmol/L    Glucose 139 (H) 65 - 100 mg/dL    BUN 22 (H) 6 - 20 MG/DL    Creatinine 1.15 0.70 - 1.30 MG/DL    BUN/Creatinine Ratio 19 12 - 20      Est, Glom Filt Rate 66 >60 ml/min/1.73m2    Calcium 8.6 8.5 - 10.1 MG/DL       Data Review images and reports reviewed    Assessment:     Principal Problem:    Acute perforated appendicitis  Active Problems:    Acute appendicitis with localized peritonitis, without perforation, abscess, or gangrene  Resolved Problems:    * No resolved hospital problems. *      Plan/Recommendations/Medical Decision Making:     Continue present treatment  WBC down to 12.7  Full liquid diet  Home once having return of bowel function with po abx  Drain out at time of discharge  Ambulate in hallway today  Dvt prophylaxis     Aiden Thomason MD  [facility]

## 2025-05-27 ENCOUNTER — TELEPHONE (OUTPATIENT)
Age: 75
End: 2025-05-27

## 2025-05-27 VITALS
WEIGHT: 184.97 LBS | HEART RATE: 60 BPM | OXYGEN SATURATION: 93 % | BODY MASS INDEX: 26.48 KG/M2 | TEMPERATURE: 98.6 F | SYSTOLIC BLOOD PRESSURE: 131 MMHG | RESPIRATION RATE: 18 BRPM | DIASTOLIC BLOOD PRESSURE: 63 MMHG | HEIGHT: 70 IN

## 2025-05-27 LAB
ANION GAP SERPL CALC-SCNC: 5 MMOL/L (ref 2–12)
BACTERIA SPEC CULT: ABNORMAL
BASOPHILS # BLD: 0.01 K/UL (ref 0–0.1)
BASOPHILS NFR BLD: 0.1 % (ref 0–1)
BUN SERPL-MCNC: 18 MG/DL (ref 6–20)
BUN/CREAT SERPL: 17 (ref 12–20)
CALCIUM SERPL-MCNC: 8.4 MG/DL (ref 8.5–10.1)
CHLORIDE SERPL-SCNC: 105 MMOL/L (ref 97–108)
CO2 SERPL-SCNC: 26 MMOL/L (ref 21–32)
CREAT SERPL-MCNC: 1.03 MG/DL (ref 0.7–1.3)
DIFFERENTIAL METHOD BLD: NORMAL
EOSINOPHIL # BLD: 0.06 K/UL (ref 0–0.4)
EOSINOPHIL NFR BLD: 0.8 % (ref 0–7)
ERYTHROCYTE [DISTWIDTH] IN BLOOD BY AUTOMATED COUNT: 12.8 % (ref 11.5–14.5)
GLUCOSE SERPL-MCNC: 152 MG/DL (ref 65–100)
GRAM STN SPEC: ABNORMAL
HCT VFR BLD AUTO: 38.6 % (ref 36.6–50.3)
HGB BLD-MCNC: 12.8 G/DL (ref 12.1–17)
IMM GRANULOCYTES # BLD AUTO: 0.04 K/UL (ref 0–0.04)
IMM GRANULOCYTES NFR BLD AUTO: 0.5 % (ref 0–0.5)
LYMPHOCYTES # BLD: 1.24 K/UL (ref 0.8–3.5)
LYMPHOCYTES NFR BLD: 15.9 % (ref 12–49)
MCH RBC QN AUTO: 28.6 PG (ref 26–34)
MCHC RBC AUTO-ENTMCNC: 33.2 G/DL (ref 30–36.5)
MCV RBC AUTO: 86.4 FL (ref 80–99)
MONOCYTES # BLD: 0.79 K/UL (ref 0–1)
MONOCYTES NFR BLD: 10.2 % (ref 5–13)
NEUTS SEG # BLD: 5.64 K/UL (ref 1.8–8)
NEUTS SEG NFR BLD: 72.5 % (ref 32–75)
NRBC # BLD: 0 K/UL (ref 0–0.01)
NRBC BLD-RTO: 0 PER 100 WBC
PLATELET # BLD AUTO: 226 K/UL (ref 150–400)
PMV BLD AUTO: 9.2 FL (ref 8.9–12.9)
POTASSIUM SERPL-SCNC: 3.5 MMOL/L (ref 3.5–5.1)
RBC # BLD AUTO: 4.47 M/UL (ref 4.1–5.7)
SERVICE CMNT-IMP: ABNORMAL
SODIUM SERPL-SCNC: 136 MMOL/L (ref 136–145)
WBC # BLD AUTO: 7.8 K/UL (ref 4.1–11.1)

## 2025-05-27 PROCEDURE — 2580000003 HC RX 258: Performed by: SURGERY

## 2025-05-27 PROCEDURE — 99024 POSTOP FOLLOW-UP VISIT: CPT | Performed by: NURSE PRACTITIONER

## 2025-05-27 PROCEDURE — 2500000003 HC RX 250 WO HCPCS: Performed by: SURGERY

## 2025-05-27 PROCEDURE — 85025 COMPLETE CBC W/AUTO DIFF WBC: CPT

## 2025-05-27 PROCEDURE — 6360000002 HC RX W HCPCS: Performed by: SURGERY

## 2025-05-27 PROCEDURE — 6370000000 HC RX 637 (ALT 250 FOR IP): Performed by: SURGERY

## 2025-05-27 PROCEDURE — 80048 BASIC METABOLIC PNL TOTAL CA: CPT

## 2025-05-27 PROCEDURE — 36415 COLL VENOUS BLD VENIPUNCTURE: CPT

## 2025-05-27 RX ORDER — OXYCODONE HYDROCHLORIDE 5 MG/1
5 TABLET ORAL EVERY 6 HOURS PRN
Qty: 10 TABLET | Refills: 0 | Status: SHIPPED | OUTPATIENT
Start: 2025-05-27 | End: 2025-05-30

## 2025-05-27 RX ADMIN — DEXTROSE MONOHYDRATE, SODIUM CHLORIDE, AND POTASSIUM CHLORIDE: 50; 4.5; 1.49 INJECTION, SOLUTION INTRAVENOUS at 05:43

## 2025-05-27 RX ADMIN — METOPROLOL SUCCINATE 25 MG: 25 TABLET, FILM COATED, EXTENDED RELEASE ORAL at 08:50

## 2025-05-27 RX ADMIN — SODIUM CHLORIDE, PRESERVATIVE FREE 10 ML: 5 INJECTION INTRAVENOUS at 08:51

## 2025-05-27 RX ADMIN — POTASSIUM CHLORIDE 10 MEQ: 1500 TABLET, EXTENDED RELEASE ORAL at 08:50

## 2025-05-27 RX ADMIN — LOSARTAN POTASSIUM 50 MG: 50 TABLET, FILM COATED ORAL at 08:50

## 2025-05-27 RX ADMIN — PIPERACILLIN AND TAZOBACTAM 3375 MG: 3; .375 INJECTION, POWDER, LYOPHILIZED, FOR SOLUTION INTRAVENOUS at 03:55

## 2025-05-27 RX ADMIN — ATORVASTATIN CALCIUM 40 MG: 40 TABLET, FILM COATED ORAL at 08:50

## 2025-05-27 RX ADMIN — CHLORTHALIDONE 25 MG: 25 TABLET ORAL at 08:50

## 2025-05-27 RX ADMIN — ENOXAPARIN SODIUM 40 MG: 100 INJECTION SUBCUTANEOUS at 08:49

## 2025-05-27 NOTE — PLAN OF CARE
Problem: Chronic Conditions and Co-morbidities  Goal: Patient's chronic conditions and co-morbidity symptoms are monitored and maintained or improved  5/27/2025 0947 by Jo, Yeong Ae Jenny, RN  Outcome: Adequate for Discharge  5/27/2025 0245 by Jamilah Jones RN  Outcome: Progressing     Problem: Pain  Goal: Verbalizes/displays adequate comfort level or baseline comfort level  5/27/2025 0947 by Jo, Yeong Ae Jenny, RN  Outcome: Adequate for Discharge  5/27/2025 0245 by Jamilah Jones RN  Outcome: Progressing  Flowsheets  Taken 5/26/2025 2020  Verbalizes/displays adequate comfort level or baseline comfort level:   Encourage patient to monitor pain and request assistance   Assess pain using appropriate pain scale   Administer analgesics based on type and severity of pain and evaluate response   Implement non-pharmacological measures as appropriate and evaluate response   Notify Licensed Independent Practitioner if interventions unsuccessful or patient reports new pain  Taken 5/26/2025 1449  Verbalizes/displays adequate comfort level or baseline comfort level:   Encourage patient to monitor pain and request assistance   Assess pain using appropriate pain scale   Administer analgesics based on type and severity of pain and evaluate response   Implement non-pharmacological measures as appropriate and evaluate response   Consider cultural and social influences on pain and pain management   Notify Licensed Independent Practitioner if interventions unsuccessful or patient reports new pain     Problem: Safety - Adult  Goal: Free from fall injury  5/27/2025 0947 by Jo, Yeong Ae Jenny, RN  Outcome: Adequate for Discharge  5/27/2025 0245 by Jamilah oJnes RN  Outcome: Progressing     Problem: Discharge Planning  Goal: Discharge to home or other facility with appropriate resources  5/27/2025 0947 by Jo, Yeong Ae Jenny, RN  Outcome: Adequate for Discharge  5/27/2025 0245 by Jamilah Jones RN  Outcome: Progressing     Problem: Skin/Tissue

## 2025-05-27 NOTE — DISCHARGE INSTRUCTIONS
Appendectomy: What to Expect at Home  Your Recovery     Your doctor removed your appendix either by making many small cuts, called incisions, in your belly (laparoscopic surgery) or through open surgery. In open surgery, the doctor makes one large incision. The incisions leave scars that usually fade over time.  After your surgery, it is normal to feel weak and tired for several days after you return home. Your belly may be swollen and may be painful. If you had laparoscopic surgery, you may have shoulder pain. This is caused by the air the doctor put in your belly to help see the organs better. The pain may last for a day or two.  You may also have nausea or vomiting, diarrhea, constipation, gas, or a headache. These problems usually go away in a few days.  Your recovery time depends on the type of surgery you had. If you had laparoscopic surgery, you will probably be able to return to work or a normal routine in a couple of weeks after surgery. If you had an open surgery, it may take longer. If your appendix ruptured, you may have a drain in your incision.  Your body will work fine without an appendix. You won't have to make any changes in your diet or lifestyle.  This care sheet gives you a general idea about how long it will take for you to recover. But each person recovers at a different pace. Follow the steps below to get better as quickly as possible.  How can you care for yourself at home?  Activity    Rest when you feel tired. Getting enough sleep will help you recover.     Try to walk each day. Start by walking a little more than you did the day before. Bit by bit, increase the amount you walk. Walking boosts blood flow and helps prevent pneumonia and constipation.     For about 2 weeks, avoid lifting anything that would make you strain. This may include a child, heavy grocery bags and milk containers, a heavy briefcase or backpack, cat litter or dog food bags, or a vacuum .     Avoid strenuous  pain, take it as prescribed.  If you are not taking a prescription pain medicine, take an over-the-counter medicine such as acetaminophen (Tylenol), ibuprofen (Advil, Motrin), or naproxen (Aleve). Read and follow all instructions on the label.  Do not take two or more pain medicines at the same time unless the doctor told you to. Many pain medicines have acetaminophen, which is Tylenol. Too much Tylenol can be harmful.     If you think your pain medicine is making you sick to your stomach:  Take your medicine after meals (unless your doctor has told you not to).  Ask your doctor for a different pain medicine.   Incision care    If you had an open surgery, you may have staples in your incision. The doctor will take these out in 7 to 10 days.     If you have strips of tape on the incision, leave the tape on until it falls off.     Gently wash the area with warm, soapy water 24 to 48 hours after your surgery, unless your doctor tells you not to. Pat the area dry.     Keep the area clean and dry. You may cover it with a gauze bandage if it oozes or rubs against clothing. Change the bandage every day or more often if needed.     If your appendix ruptured, you may have an incision with packing in it. Change the packing as often as your doctor tells you to.  Packing changes may hurt at first. Taking pain medicine about half an hour before you change the packing can help.  If your dressing sticks to your wound, try soaking it with warm water for about 10 minutes before you remove it. You can do this in the shower or by placing a wet washcloth over the dressing.  Remove the old packing and rinse the incision with water. Gently pat the top area dry.  The size of the incision determines how much gauze you need to put inside. Fold the gauze over once, but do not wad it up so that it hurts. Put it in the wound carefully. You want to keep the sides of the wound from touching. A cotton swab may help you push the gauze in as

## 2025-05-27 NOTE — DISCHARGE SUMMARY
Post- Surgical Discharge Summary    Patient ID:  Rafal Soliz  [id number]  male  74 y.o.  1950    Admit date: 5/24/2025    Discharge date: 05/27/25     Admitting Physician: Richie Parker MD     Date of Surgery:   5/24/2025     Preoperative Diagnosis:  Appendicitis, unspecified appendicitis type [K37]    Postoperative Diagnosis:   * No post-op diagnosis entered *    Procedure(s):  LAPAROSCOPIC APPENDECTOMY     Anesthesia Type:   General     Surgeon: Richie Parker MD                            HPI:  Pt is a 74 y.o. male who has a history of Appendicitis, unspecified appendicitis type [K37] who presents at this time for a laparoscopic appendectomy.    Problem List:   Patient Active Problem List   Diagnosis    Elevated liver enzymes    Low TSH level    HTN (hypertension)    Hyperlipidemia    Subclinical hyperthyroidism    Acute perforated appendicitis    Acute appendicitis with localized peritonitis, without perforation, abscess, or gangrene        Hospital Course:  The patient underwent surgery. Intra-operative complications: None; patient tolerated the procedure well. Was taken to the PACU in stable condition and then transferred to the surgical floor.      Pathology is pending. Surgeon will follow results as outpatient.    Perioperative Antibiotics: Piperacillin/Tazobactam    Postoperative Pain Management:  Oxycodone     Postoperative transfusions:    Number of units banked PRBCs =   none     Post Op complications: None     Incisions  - clean, dry and intact. No significant erythema or swelling. Wound(s) appear to be healing without any evidence of infection.      Patient mobilized with nursing and was found to be safe and steady with ambulation.     Discharged to: Home     Condition on Discharge: Stable     Discharge instructions:    - Take pain medications as prescribed  - Diet Low Fiber  - Discharge activity:    - Activity as tolerated    - Ambulate several times a day   - No heavy lifting for 4 weeks   -  [id number] - [place], [place] - [address] -  [phone] - F [phone]  [address] [address]      Phone: [phone]   amoxicillin-clavulanate 875-125 MG per tablet  oxyCODONE 5 MG immediate release tablet      per medical continuation form      - Follow up in office in 1 weeks      Signed:  Soraida Wagner  MSN, APRN, FNP-C, CWOCN-AP, RNAS-C  Surgical Nurse Practitioner    5/27/2025  9:45 AM

## 2025-05-27 NOTE — TELEPHONE ENCOUNTER
Patients spouse called and states he is allergic to penicillin, states the pharmacy needs an okay from us for him to take the prescribed pain medication.

## 2025-05-27 NOTE — TELEPHONE ENCOUNTER
Pt informed he could pickup the pain medication only.  I also let him know I had sent a message to Soraida regarding the penicillin allergy and the amoxicillin ordered.  Soraida should be  back in the office tomorrow.  Pt agreeable.

## 2025-05-27 NOTE — PROGRESS NOTES
End of Shift Note    Bedside shift change report given to Dorothy Mo (oncoming nurse) by Jamilah Jones, ELIDA (offgoing nurse).  Report included the following information SBAR, OR Summary, Intake/Output, MAR, and Recent Results    Shift worked:  1900-0730     Shift summary and any significant changes:     Patient resting quietly in bed this shift, up ad toña to BR, ambulating in room to chair, refusing bed alarm; voiding clear yellow urine, several soft BM reported; no c/o pain     Concerns for physician to address:  Patient would like diet advanced     Zone phone for oncoming shift:       Activity:  Level of Assistance: Independent  Number times ambulated in hallways past shift: 0  Number of times OOB to chair past shift: 4    Cardiac:   Cardiac Monitoring: No      Cardiac Rhythm: Sinus rhythm    Access:  Current line(s): PIV     Genitourinary:   Urinary Status: Voiding    Respiratory:   O2 Device: None (Room air)  Chronic home O2 use?: NO  Incentive spirometer at bedside: YES    GI:  Last BM (including prior to admit): 05/26/25  Current diet:  ADULT DIET; Full Liquid  Passing flatus: YES    Pain Management:   Patient states pain is manageable on current regimen: YES    Skin:  Fortino Scale Score: 21  Interventions: Wound Offloading (Prevention Methods): Repositioning, Turning, Pillows    Patient Safety:  Fall Risk: Nursing Judgement-Fall Risk High(Add Comments): Yes  Fall Risk Interventions  Nursing Judgement-Fall Risk High(Add Comments): Yes  Toilet Every 2 Hours-In Advance of Need: Yes  Hourly Visual Checks: Awake, In chair, Quiet  Fall Visual Posted: Fall sign posted, Socks  Room Door Open: Yes  Alarm On: Other (Comment) (patient declined alarm, education provided)  Patient Moved Closer to Nursing Station: No    Active Consults:   IP CONSULT TO SPIRITUAL SERVICES    Length of Stay:  Expected LOS: 3  Actual LOS: 3    Jamilah Jones, RN

## 2025-05-28 ENCOUNTER — TELEPHONE (OUTPATIENT)
Age: 75
End: 2025-05-28

## 2025-05-28 LAB
EKG ATRIAL RATE: 93 BPM
EKG DIAGNOSIS: NORMAL
EKG P AXIS: 58 DEGREES
EKG P-R INTERVAL: 136 MS
EKG Q-T INTERVAL: 378 MS
EKG QRS DURATION: 146 MS
EKG QTC CALCULATION (BAZETT): 469 MS
EKG R AXIS: 49 DEGREES
EKG T AXIS: 16 DEGREES
EKG VENTRICULAR RATE: 93 BPM

## 2025-05-28 NOTE — TELEPHONE ENCOUNTER
Care Transitions Initial Follow Up Call    Outreach made within 2 business days of discharge: Yes    Patient: Rafal Soliz Patient : 1950   MRN: [id number]  Reason for Admission: Appendectomy  Discharge Date: 25       Spoke with: patient    Discharge department/facility: [facility] Interactive Patient Contact:  Was patient able to fill all prescriptions: Yes- Awaiting phone call from surgeon regarding ABX  Was patient instructed to bring all medications to the follow-up visit: Yes  Is patient taking all medications as directed in the discharge summary? Yes  Does patient understand their discharge instructions: Yes  Does patient have questions or concerns that need addressed prior to 7-14 day follow up office visit: no    Additional needs identified to be addressed with provider  No needs identified             Scheduled appointment with PCP within 7-14 days    Follow Up  Future Appointments   Date Time Provider Department Center   2025  1:50 PM Richie Parker MD [facility] BS AMB   2025  9:30 AM Richie Aguilar MD [facility] MAIN BS ECC DEP   10/14/2025  2:30 PM Addy Elam MD [facility] BS AMB       GAURI HERNANDEZ, YANIQUEN

## 2025-06-04 ENCOUNTER — OFFICE VISIT (OUTPATIENT)
Age: 75
End: 2025-06-04

## 2025-06-04 VITALS
BODY MASS INDEX: 25.2 KG/M2 | HEIGHT: 70 IN | TEMPERATURE: 97.9 F | WEIGHT: 176 LBS | OXYGEN SATURATION: 98 % | DIASTOLIC BLOOD PRESSURE: 72 MMHG | SYSTOLIC BLOOD PRESSURE: 138 MMHG | HEART RATE: 63 BPM

## 2025-06-04 DIAGNOSIS — Z09 POSTOPERATIVE FOLLOW-UP: Primary | ICD-10-CM

## 2025-06-04 PROCEDURE — 99024 POSTOP FOLLOW-UP VISIT: CPT | Performed by: SURGERY

## 2025-06-04 ASSESSMENT — PATIENT HEALTH QUESTIONNAIRE - PHQ9
SUM OF ALL RESPONSES TO PHQ QUESTIONS 1-9: 0
2. FEELING DOWN, DEPRESSED OR HOPELESS: NOT AT ALL
1. LITTLE INTEREST OR PLEASURE IN DOING THINGS: NOT AT ALL
SUM OF ALL RESPONSES TO PHQ QUESTIONS 1-9: 0

## 2025-06-04 NOTE — PROGRESS NOTES
SURGERY PROGRESS NOTE        Subjective:     Patient has no complaints      Objective:     /72 (BP Site: Left Upper Arm, Patient Position: Sitting, BP Cuff Size: Large Adult)   Pulse 63   Temp 97.9 °F (36.6 °C) (Temporal)   Ht 1.778 m (5' 10\")   Wt 79.8 kg (176 lb)   SpO2 98%   BMI 25.25 kg/m²        Physical Exam:    General:  alert, appears stated age, cooperative, and no distress   Abdomen: soft, bowel sounds active, non-tender   Incision:   healing well, no drainage, no erythema, no hernia, no seroma, no swelling, no dehiscence, incision well approximated     Path - acute appendicitis.  Reviewed with patient    Assessment:     Doing well    Plan:        PRN follow up.

## 2025-06-04 NOTE — PROGRESS NOTES
Identified pt with two pt identifiers (name and ). Reviewed chart in preparation for visit and have obtained necessary documentation.    Rafal Soliz is a 74 y.o. male  Chief Complaint   Patient presents with    Post-Op Check     lap appendectomy     /72 (BP Site: Left Upper Arm, Patient Position: Sitting, BP Cuff Size: Large Adult)   Pulse 63   Temp 97.9 °F (36.6 °C) (Temporal)   Ht 1.778 m (5' 10\")   Wt 79.8 kg (176 lb)   SpO2 98%   BMI 25.25 kg/m²     1. Have you been to the ER, urgent care clinic since your last visit?  Hospitalized since your last visit?no    2. Have you seen or consulted any other health care providers outside of the [facility] since your last visit?  Include any pap smears or colon screening. no    Orbicularis Oris Muscle Flap Text: The defect edges were debeveled with a #15 scalpel blade.  Given that the defect affected the competency of the oral sphincter an orbicularis oris muscle flap was deemed most appropriate to restore this competency and normal muscle function.  Using a sterile surgical marker, an appropriate flap was drawn incorporating the defect. The area thus outlined was incised with a #15 scalpel blade.

## 2025-06-08 NOTE — PROGRESS NOTES
Physician Progress Note      PATIENT:               JEAN PHAM  CSN #:                  [id number]  :                       1950  ADMIT DATE:       2025 3:38 PM  DISCH DATE:        2025 11:37 AM  RESPONDING  PROVIDER #:        Soraida Wagner NP          QUERY TEXT:    acute perforated appendix is documented in the H&P 2025. ?Please clarify   any associated diagnoses:    The clinical indicators include:  Age 74-year male, Pancreatitis,    74 year old with acute perforated appendix-( H&P 2025,Richie Parker MD)    Acute appendicitis with localized peritonitis, without perforation, abscess,   or gangrene,WBC down to 12.7-(Surgery Progress Notes 2025,Aiden Thomason MD)    WBC ON -15.4  WBC ON - 12.7  HR ON - 90,92,93,94,  RR ON - 20,21,23,24,    s/p Lap appendectomy, IV piperacillin-tazobactam, IV Levaquin and Flagyl,   IVFs, cultures,? serial labs, General Surgery consulted      Thank you  Elizabeth Ansari [facility]  Options provided:  -- Sepsis Due to Acute appendicitis with localized peritonitis, present on   admission  -- Acute appendicitis with localized peritonitis, without sepsis, but with   SIRS.  -- Other - I will add my own diagnosis  -- Disagree - Not applicable / Not valid  -- Disagree - Clinically unable to determine / Unknown  -- Refer to Clinical Documentation Reviewer    PROVIDER RESPONSE TEXT:    This patient has Acute appendicitis with localized peritonitis, without   sepsis, but with SIRS.    Query created by: Elizabeth Ansari on 6/3/2025 12:38 AM      Electronically signed by:  Soraida Wagner NP 2025 7:09 AM

## 2025-06-22 SDOH — HEALTH STABILITY: PHYSICAL HEALTH: ON AVERAGE, HOW MANY MINUTES DO YOU ENGAGE IN EXERCISE AT THIS LEVEL?: 150+ MIN

## 2025-06-22 SDOH — HEALTH STABILITY: PHYSICAL HEALTH: ON AVERAGE, HOW MANY DAYS PER WEEK DO YOU ENGAGE IN MODERATE TO STRENUOUS EXERCISE (LIKE A BRISK WALK)?: 6 DAYS

## 2025-06-22 ASSESSMENT — PATIENT HEALTH QUESTIONNAIRE - PHQ9
2. FEELING DOWN, DEPRESSED OR HOPELESS: NOT AT ALL
SUM OF ALL RESPONSES TO PHQ QUESTIONS 1-9: 0
1. LITTLE INTEREST OR PLEASURE IN DOING THINGS: NOT AT ALL

## 2025-06-22 ASSESSMENT — LIFESTYLE VARIABLES
HOW OFTEN DO YOU HAVE A DRINK CONTAINING ALCOHOL: NEVER
HOW MANY STANDARD DRINKS CONTAINING ALCOHOL DO YOU HAVE ON A TYPICAL DAY: 0
HOW OFTEN DO YOU HAVE A DRINK CONTAINING ALCOHOL: 1
HOW MANY STANDARD DRINKS CONTAINING ALCOHOL DO YOU HAVE ON A TYPICAL DAY: PATIENT DOES NOT DRINK
HOW OFTEN DO YOU HAVE SIX OR MORE DRINKS ON ONE OCCASION: 1

## 2025-06-23 ENCOUNTER — OFFICE VISIT (OUTPATIENT)
Age: 75
End: 2025-06-23
Payer: MEDICARE

## 2025-06-23 VITALS
TEMPERATURE: 98.1 F | WEIGHT: 179.2 LBS | OXYGEN SATURATION: 98 % | DIASTOLIC BLOOD PRESSURE: 70 MMHG | RESPIRATION RATE: 18 BRPM | BODY MASS INDEX: 25.65 KG/M2 | SYSTOLIC BLOOD PRESSURE: 130 MMHG | HEIGHT: 70 IN | HEART RATE: 71 BPM

## 2025-06-23 DIAGNOSIS — E05.90 THYROTOXICOSIS WITHOUT THYROID STORM, UNSPECIFIED THYROTOXICOSIS TYPE: ICD-10-CM

## 2025-06-23 DIAGNOSIS — Z12.5 SCREENING FOR PROSTATE CANCER: ICD-10-CM

## 2025-06-23 DIAGNOSIS — E78.2 MIXED HYPERLIPIDEMIA: ICD-10-CM

## 2025-06-23 DIAGNOSIS — E11.9 TYPE 2 DIABETES MELLITUS WITHOUT COMPLICATION, WITHOUT LONG-TERM CURRENT USE OF INSULIN (HCC): ICD-10-CM

## 2025-06-23 DIAGNOSIS — Z00.00 MEDICARE ANNUAL WELLNESS VISIT, SUBSEQUENT: Primary | ICD-10-CM

## 2025-06-23 PROCEDURE — 3078F DIAST BP <80 MM HG: CPT | Performed by: INTERNAL MEDICINE

## 2025-06-23 PROCEDURE — 3075F SYST BP GE 130 - 139MM HG: CPT | Performed by: INTERNAL MEDICINE

## 2025-06-23 PROCEDURE — 1123F ACP DISCUSS/DSCN MKR DOCD: CPT | Performed by: INTERNAL MEDICINE

## 2025-06-23 PROCEDURE — G0439 PPPS, SUBSEQ VISIT: HCPCS | Performed by: INTERNAL MEDICINE

## 2025-06-23 PROCEDURE — 99214 OFFICE O/P EST MOD 30 MIN: CPT | Performed by: INTERNAL MEDICINE

## 2025-06-23 PROCEDURE — 1159F MED LIST DOCD IN RCRD: CPT | Performed by: INTERNAL MEDICINE

## 2025-06-23 NOTE — PROGRESS NOTES
Duration 30 minutes primarily education, review of imaging, labs and records.    Assessment & Plan  1. Post-appendectomy status.  - Reports feeling 95% back to normal with a good appetite and minimal muscle soreness.  - Encouraged to continue walking as a form of exercise.  - No additional interventions are needed at this time.  - Follow-up in 1 year or sooner if any concerns arise.    2. hyperthyroidism.  - Currently taking methimazole half a pill every other day.  - Thyroid function test will be conducted today to monitor his condition.  - No changes in medication regimen.  - Follow-up with Dr. Joe as scheduled.    3. Prediabetes.  - Last A1c was 6.2, indicating prediabetes.  - Recent weight loss likely improved A1c levels.  - A1c test will be conducted today to confirm current status.  - No medication prescribed for diabetes.    4. Hypertension.  - Blood pressure readings range from 110 to 137 systolic.  - Advised to take chlorthalidone 25 mg once or twice a week, or half a pill every other day, particularly during hot weather.  - Monitor blood pressure closely and avoid overhydration.  - If blood pressure rises to the 150s, take one tablet of chlorthalidone 25 mg.    5. Memory issues.  - Expressed concerns about memory issues post-anesthesia.  - Reassurance provided that memory issues can persist for several months post-surgery.  - No specific treatment prescribed for memory issues.  - Continue monitoring and follow-up as needed.          Chief Complaint   Patient presents with    Medicare AWV         Orders Placed This Encounter   Procedures    CBC with Auto Differential     Standing Status:   Future     Expected Date:   6/23/2025     Expiration Date:   6/23/2026    Comprehensive Metabolic Panel     Standing Status:   Future     Expected Date:   6/23/2025     Expiration Date:   6/23/2026    T4, Free     Standing Status:   Future     Expected Date:   6/23/2025     Expiration Date:   6/23/2026    TSH     
Medicare Annual Wellness Visit    Rafal Soliz is here for Medicare AWV    Assessment & Plan   Medicare annual wellness visit, subsequent  Type 2 diabetes mellitus without complication, without long-term current use of insulin (HCC)       No follow-ups on file.     Subjective     Patient's complete Health Risk Assessment and screening values have been reviewed and are found in Flowsheets. The following problems were reviewed today and where indicated follow up appointments were made and/or referrals ordered.                    Hearing Screen:  Do you or your family notice any trouble with your hearing that hasn't been managed with hearing aids?: (!) (Patient-Rptd) Yes    Interventions:  See AVS for additional education material               Objective   Vitals:    06/23/25 0929   BP: (!) 169/71   BP Site: Left Upper Arm   Patient Position: Sitting   BP Cuff Size: Medium Adult   Pulse: 71   Resp: 18   Temp: 98.1 °F (36.7 °C)   TempSrc: Temporal   SpO2: 98%   Weight: 81.3 kg (179 lb 3.2 oz)   Height: 1.778 m (5' 10\")      Body mass index is 25.71 kg/m².                   Allergies   Allergen Reactions    Ace Inhibitors Cough    Amlodipine      Other reaction(s): Not Reported This Time    Loratadine Hives    Penicillins      As a child      Prior to Visit Medications    Medication Sig Taking? Authorizing Provider   Multiple Vitamins-Minerals (PRESERVISION AREDS 2 PO) Take by mouth daily Yes ProviderAlvaro MD   atorvastatin (LIPITOR) 40 MG tablet Take 1 tablet by mouth daily Yes Richie Aguilar MD   chlorthalidone (HYGROTON) 25 MG tablet Take 1 tablet by mouth daily Yes Richie Aguilar MD   methIMAzole (TAPAZOLE) 5 MG tablet Take 0.5 tablets by mouth every other day Yes Richie Aguilar MD   metoprolol succinate (TOPROL XL) 25 MG extended release tablet Take 1 tablet by mouth daily Yes Richie Aguilar MD   olmesartan (BENICAR) 20 MG tablet Take 1 tablet by mouth daily Yes Richie Aguilar MD 
Help Needed No Help Needed No Help Needed   Using the telphone No Help Needed No Help Needed No Help Needed No Help Needed No Help Needed   Taking your medications No Help Needed No Help Needed No Help Needed No Help Needed No Help Needed   Preparing meals No Help Needed No Help Needed No Help Needed No Help Needed No Help Needed   Managing money (expenses/bills) No Help Needed No Help Needed No Help Needed No Help Needed No Help Needed   Moderately strenuous housework (laundry) No Help Needed No Help Needed No Help Needed No Help Needed No Help Needed   Shopping for personal items (toiletries/medicines) No Help Needed No Help Needed No Help Needed No Help Needed No Help Needed   Shopping for groceries No Help Needed No Help Needed No Help Needed No Help Needed No Help Needed   Driving No Help Needed No Help Needed No Help Needed No Help Needed No Help Needed   Climbing a flight of stairs No Help Needed No Help Needed No Help Needed No Help Needed No Help Needed   Getting to places beyond walking distances No Help Needed No Help Needed No Help Needed No Help Needed No Help Needed           12/9/2024     9:00 AM   AMB Abuse Screening   Do you ever feel afraid of your partner? N   Are you in a relationship with someone who physically or mentally threatens you? N   Is it safe for you to go home? Y       Advance Care Planning     The patient has appointed the following active healthcare agents:    Primary Decision Maker: Krys Soliz - Spouse - [phone]

## 2025-06-24 LAB
ALBUMIN SERPL-MCNC: 4.4 G/DL (ref 3.8–4.8)
ALP SERPL-CCNC: 51 IU/L (ref 44–121)
ALT SERPL-CCNC: 17 IU/L (ref 0–44)
AST SERPL-CCNC: 22 IU/L (ref 0–40)
BASOPHILS # BLD AUTO: 0 X10E3/UL (ref 0–0.2)
BASOPHILS NFR BLD AUTO: 0 %
BILIRUB SERPL-MCNC: 0.8 MG/DL (ref 0–1.2)
BUN SERPL-MCNC: 19 MG/DL (ref 8–27)
BUN/CREAT SERPL: 20 (ref 10–24)
CALCIUM SERPL-MCNC: 9.7 MG/DL (ref 8.6–10.2)
CHLORIDE SERPL-SCNC: 102 MMOL/L (ref 96–106)
CHOLEST SERPL-MCNC: 134 MG/DL (ref 100–199)
CO2 SERPL-SCNC: 26 MMOL/L (ref 20–29)
CREAT SERPL-MCNC: 0.95 MG/DL (ref 0.76–1.27)
EGFRCR SERPLBLD CKD-EPI 2021: 83 ML/MIN/1.73
EOSINOPHIL # BLD AUTO: 0.1 X10E3/UL (ref 0–0.4)
EOSINOPHIL NFR BLD AUTO: 1 %
ERYTHROCYTE [DISTWIDTH] IN BLOOD BY AUTOMATED COUNT: 13.1 % (ref 11.6–15.4)
GLOBULIN SER CALC-MCNC: 2.1 G/DL (ref 1.5–4.5)
GLUCOSE SERPL-MCNC: 130 MG/DL (ref 70–99)
HCT VFR BLD AUTO: 41.5 % (ref 37.5–51)
HDLC SERPL-MCNC: 58 MG/DL
HGB BLD-MCNC: 13.4 G/DL (ref 13–17.7)
IMM GRANULOCYTES # BLD AUTO: 0 X10E3/UL (ref 0–0.1)
IMM GRANULOCYTES NFR BLD AUTO: 0 %
LDLC SERPL CALC-MCNC: 63 MG/DL (ref 0–99)
LYMPHOCYTES # BLD AUTO: 1.1 X10E3/UL (ref 0.7–3.1)
LYMPHOCYTES NFR BLD AUTO: 22 %
MCH RBC QN AUTO: 29.1 PG (ref 26.6–33)
MCHC RBC AUTO-ENTMCNC: 32.3 G/DL (ref 31.5–35.7)
MCV RBC AUTO: 90 FL (ref 79–97)
MONOCYTES # BLD AUTO: 0.4 X10E3/UL (ref 0.1–0.9)
MONOCYTES NFR BLD AUTO: 9 %
NEUTROPHILS # BLD AUTO: 3.4 X10E3/UL (ref 1.4–7)
NEUTROPHILS NFR BLD AUTO: 68 %
PLATELET # BLD AUTO: 171 X10E3/UL (ref 150–450)
POTASSIUM SERPL-SCNC: 4.2 MMOL/L (ref 3.5–5.2)
PROT SERPL-MCNC: 6.5 G/DL (ref 6–8.5)
PSA SERPL-MCNC: 1.2 NG/ML (ref 0–4)
RBC # BLD AUTO: 4.61 X10E6/UL (ref 4.14–5.8)
SODIUM SERPL-SCNC: 142 MMOL/L (ref 134–144)
T4 FREE SERPL-MCNC: 1.61 NG/DL (ref 0.82–1.77)
TRIGL SERPL-MCNC: 64 MG/DL (ref 0–149)
TSH SERPL DL<=0.005 MIU/L-ACNC: 0.04 UIU/ML (ref 0.45–4.5)
VLDLC SERPL CALC-MCNC: 13 MG/DL (ref 5–40)
WBC # BLD AUTO: 5 X10E3/UL (ref 3.4–10.8)

## 2025-06-26 LAB
EST. AVERAGE GLUCOSE BLD GHB EST-MCNC: 140 MG/DL
HBA1C MFR BLD: 6.5 %HB

## (undated) DEVICE — Device

## (undated) DEVICE — TOWEL 4 PLY TISS 19X30 SUE WHT

## (undated) DEVICE — HYPODERMIC SAFETY NEEDLE: Brand: MAGELLAN

## (undated) DEVICE — BASIN EMSIS 16OZ GRAPHITE PLAS KID SHP MOLD GRAD FOR ORAL

## (undated) DEVICE — TRANSFER SET 3": Brand: MEDLINE INDUSTRIES, INC.

## (undated) DEVICE — 40580 - THE PINK PAD - ADVANCED TRENDELENBURG POSITIONING KIT: Brand: 40580 - THE PINK PAD - ADVANCED TRENDELENBURG POSITIONING KIT

## (undated) DEVICE — SYR 10ML LUER LOK 1/5ML GRAD --

## (undated) DEVICE — TROCAR: Brand: KII® SLEEVE

## (undated) DEVICE — CATH IV AUTOGRD BC PNK 20GA 25 -- INSYTE

## (undated) DEVICE — TISSUE RETRIEVAL SYSTEM: Brand: INZII RETRIEVAL SYSTEM

## (undated) DEVICE — GENERAL LAPAROSCOPY-MRMC: Brand: MEDLINE INDUSTRIES, INC.

## (undated) DEVICE — SOLIDIFIER MEDC 1200ML -- CONVERT TO 356117

## (undated) DEVICE — STAPLER INT L340MM 45MM STD 12 FIRING B FRM PWR + GRIPPING

## (undated) DEVICE — 3M™ TEGADERM™ TRANSPARENT FILM DRESSING FRAME STYLE, 1624W, 2-3/8 IN X 2-3/4 IN (6 CM X 7 CM), 100/CT 4CT/CASE: Brand: 3M™ TEGADERM™

## (undated) DEVICE — SUTURE VICRYL + SZ 0 L27IN ABSRB VLT L26MM UR-6 5/8 CIR VCP603H

## (undated) DEVICE — DEVICE SUT FOR TRCR SITE INCIS ENDO CLOSE

## (undated) DEVICE — TROCAR: Brand: KII FIOS FIRST ENTRY

## (undated) DEVICE — SOLUTION IV 500ML 0.9% SOD BOTTLE CHL LTWT DURABLE SHATTERPROOF

## (undated) DEVICE — Z DISCONTINUED PER MEDLINE LINE GAS SAMPLING O2/CO2 LNG AD 13 FT NSL W/ TBNG FILTERLINE

## (undated) DEVICE — GLOVE ORTHO 7 1/2   MSG9475

## (undated) DEVICE — 1200 GUARD II KIT W/5MM TUBE W/O VAC TUBE: Brand: GUARDIAN

## (undated) DEVICE — SYR 3ML LL TIP 1/10ML GRAD --

## (undated) DEVICE — LAPAROSCOPIC TROCAR SLEEVE/SINGLE USE: Brand: KII® OPTICAL ACCESS SYSTEM

## (undated) DEVICE — NEONATAL-ADULT SPO2 SENSOR: Brand: NELLCOR

## (undated) DEVICE — AEGIS 1" DISK 4MM HOLE, PEEL OPEN: Brand: MEDLINE

## (undated) DEVICE — KENDALL RADIOLUCENT FOAM MONITORING ELECTRODE RECTANGULAR SHAPE: Brand: KENDALL

## (undated) DEVICE — DRAIN SURG 19FR L025IN DIA63MM SIL CHN RND FULL FLUT CLS

## (undated) DEVICE — SUTURE ETHILON SZ 2-0 L18IN NONABSORBABLE BLK L19MM PS-2 PRIM 593H

## (undated) DEVICE — NEEDLE HYPO 18GA L1.5IN PNK S STL HUB POLYPR SHLD REG BVL

## (undated) DEVICE — SET ADMIN 16ML TBNG L100IN 2 Y INJ SITE IV PIGGY BK DISP

## (undated) DEVICE — SUTURE MONOCRYL SZ 4-0 L27IN ABSRB UD L19MM PS-2 1/2 CIR PRIM Y426H

## (undated) DEVICE — SHEAR HARMONIC 700 VES SEAL 360 DEG SHFT L 36 MM DIA 5 MM JAW L

## (undated) DEVICE — RELOAD STPL L45MM H1.5-3.6MM REG TISS BLU GRIPPING SURF B